# Patient Record
Sex: FEMALE | Race: WHITE | NOT HISPANIC OR LATINO
[De-identification: names, ages, dates, MRNs, and addresses within clinical notes are randomized per-mention and may not be internally consistent; named-entity substitution may affect disease eponyms.]

---

## 2017-01-10 ENCOUNTER — APPOINTMENT (OUTPATIENT)
Dept: OBGYN | Facility: CLINIC | Age: 37
End: 2017-01-10

## 2017-03-27 ENCOUNTER — RESULT REVIEW (OUTPATIENT)
Age: 37
End: 2017-03-27

## 2017-03-28 ENCOUNTER — APPOINTMENT (OUTPATIENT)
Dept: OBGYN | Facility: CLINIC | Age: 37
End: 2017-03-28

## 2017-08-30 ENCOUNTER — APPOINTMENT (OUTPATIENT)
Dept: OBGYN | Facility: CLINIC | Age: 37
End: 2017-08-30
Payer: COMMERCIAL

## 2017-08-30 PROCEDURE — 81025 URINE PREGNANCY TEST: CPT

## 2017-08-30 PROCEDURE — 99214 OFFICE O/P EST MOD 30 MIN: CPT

## 2017-09-29 ENCOUNTER — APPOINTMENT (OUTPATIENT)
Dept: OBGYN | Facility: CLINIC | Age: 37
End: 2017-09-29

## 2017-10-03 ENCOUNTER — APPOINTMENT (OUTPATIENT)
Dept: UROGYNECOLOGY | Facility: CLINIC | Age: 37
End: 2017-10-03

## 2017-10-05 ENCOUNTER — APPOINTMENT (OUTPATIENT)
Dept: ULTRASOUND IMAGING | Facility: IMAGING CENTER | Age: 37
End: 2017-10-05
Payer: COMMERCIAL

## 2017-10-05 ENCOUNTER — APPOINTMENT (OUTPATIENT)
Dept: MAMMOGRAPHY | Facility: IMAGING CENTER | Age: 37
End: 2017-10-05
Payer: COMMERCIAL

## 2017-10-05 ENCOUNTER — OUTPATIENT (OUTPATIENT)
Dept: OUTPATIENT SERVICES | Facility: HOSPITAL | Age: 37
LOS: 1 days | End: 2017-10-05
Payer: COMMERCIAL

## 2017-10-05 DIAGNOSIS — Z00.8 ENCOUNTER FOR OTHER GENERAL EXAMINATION: ICD-10-CM

## 2017-10-05 PROCEDURE — G0204: CPT | Mod: 26

## 2017-10-05 PROCEDURE — 76641 ULTRASOUND BREAST COMPLETE: CPT

## 2017-10-05 PROCEDURE — G0279: CPT | Mod: 26

## 2017-10-05 PROCEDURE — G0279: CPT

## 2017-10-05 PROCEDURE — 77066 DX MAMMO INCL CAD BI: CPT

## 2017-10-05 PROCEDURE — 76641 ULTRASOUND BREAST COMPLETE: CPT | Mod: 26,50

## 2018-08-30 ENCOUNTER — APPOINTMENT (OUTPATIENT)
Dept: UROGYNECOLOGY | Facility: CLINIC | Age: 38
End: 2018-08-30
Payer: COMMERCIAL

## 2018-09-27 ENCOUNTER — APPOINTMENT (OUTPATIENT)
Dept: UROGYNECOLOGY | Facility: CLINIC | Age: 38
End: 2018-09-27
Payer: COMMERCIAL

## 2018-09-27 VITALS
HEART RATE: 92 BPM | WEIGHT: 151 LBS | BODY MASS INDEX: 27.79 KG/M2 | HEIGHT: 62 IN | SYSTOLIC BLOOD PRESSURE: 110 MMHG | DIASTOLIC BLOOD PRESSURE: 77 MMHG

## 2018-09-27 DIAGNOSIS — F17.200 NICOTINE DEPENDENCE, UNSPECIFIED, UNCOMPLICATED: ICD-10-CM

## 2018-09-27 DIAGNOSIS — Z78.9 OTHER SPECIFIED HEALTH STATUS: ICD-10-CM

## 2018-09-27 DIAGNOSIS — Z83.42 FAMILY HISTORY OF FAMILIAL HYPERCHOLESTEROLEMIA: ICD-10-CM

## 2018-09-27 DIAGNOSIS — Z86.39 PERSONAL HISTORY OF OTHER ENDOCRINE, NUTRITIONAL AND METABOLIC DISEASE: ICD-10-CM

## 2018-09-27 DIAGNOSIS — N39.46 MIXED INCONTINENCE: ICD-10-CM

## 2018-09-27 LAB
BILIRUB UR QL STRIP: NORMAL
CLARITY UR: CLEAR
COLLECTION METHOD: NORMAL
GLUCOSE UR-MCNC: 500
HCG UR QL: 0.2 EU/DL
HGB UR QL STRIP.AUTO: NORMAL
KETONES UR-MCNC: NORMAL
LEUKOCYTE ESTERASE UR QL STRIP: NORMAL
NITRITE UR QL STRIP: NORMAL
PH UR STRIP: 5.5
PROT UR STRIP-MCNC: NORMAL
SP GR UR STRIP: 1.02

## 2018-09-27 PROCEDURE — 51725 SIMPLE CYSTOMETROGRAM: CPT

## 2018-09-27 PROCEDURE — 99204 OFFICE O/P NEW MOD 45 MIN: CPT | Mod: 25

## 2018-09-27 PROCEDURE — 51741 ELECTRO-UROFLOWMETRY FIRST: CPT

## 2018-10-01 ENCOUNTER — RESULT REVIEW (OUTPATIENT)
Age: 38
End: 2018-10-01

## 2018-10-01 LAB
APPEARANCE: CLEAR
BACTERIA UR CULT: NORMAL
BACTERIA: NEGATIVE
BILIRUBIN URINE: NEGATIVE
BLOOD URINE: NEGATIVE
COLOR: YELLOW
GLUCOSE QUALITATIVE U: 1000 MG/DL
HYALINE CASTS: 2 /LPF
KETONES URINE: NEGATIVE
LEUKOCYTE ESTERASE URINE: NEGATIVE
MICROSCOPIC-UA: NORMAL
NITRITE URINE: NEGATIVE
PH URINE: 5.5
PROTEIN URINE: NEGATIVE MG/DL
RED BLOOD CELLS URINE: 2 /HPF
SPECIFIC GRAVITY URINE: 1.05
SQUAMOUS EPITHELIAL CELLS: 1 /HPF
UROBILINOGEN URINE: NEGATIVE MG/DL
WHITE BLOOD CELLS URINE: 1 /HPF

## 2018-10-03 ENCOUNTER — EMERGENCY (EMERGENCY)
Facility: HOSPITAL | Age: 38
LOS: 1 days | Discharge: HOME | End: 2018-10-03
Attending: EMERGENCY MEDICINE
Payer: COMMERCIAL

## 2018-10-03 VITALS
DIASTOLIC BLOOD PRESSURE: 67 MMHG | HEART RATE: 90 BPM | SYSTOLIC BLOOD PRESSURE: 104 MMHG | OXYGEN SATURATION: 98 % | TEMPERATURE: 98 F | RESPIRATION RATE: 17 BRPM

## 2018-10-03 VITALS
OXYGEN SATURATION: 97 % | SYSTOLIC BLOOD PRESSURE: 110 MMHG | RESPIRATION RATE: 16 BRPM | HEART RATE: 84 BPM | DIASTOLIC BLOOD PRESSURE: 77 MMHG

## 2018-10-03 PROCEDURE — 99283 EMERGENCY DEPT VISIT LOW MDM: CPT

## 2018-10-03 PROCEDURE — 99284 EMERGENCY DEPT VISIT MOD MDM: CPT
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
## 2018-10-03 NOTE — ED ADULT NURSE NOTE - OBJECTIVE STATEMENT
37y/o female BIBEMS a&ox3 c/o leg weakness. Patient reports about an hour prior to arrival she developed "weakness" above b/l knees. Denies pain, numbness/tingling, difficulty ambulating, vision changes, changes in speech or any other symptoms. States "I wore different shoes yesterday, they were high heals, it could be from the new shoes."  Lungs clear b/l. VAUGHN x4 equal in strength b/l. +sensation b/l. Awaiting MD villavicencio.

## 2018-10-03 NOTE — ED PROVIDER NOTE - PLAN OF CARE
You were seen in the ED for leg weakness. Your physical examination was normal. It is not entirely clear what is causing your symptoms at this time however it does not appear to be anything immediately dangerous. Please see your regular doctor in 1-2 days for further invest

## 2018-10-03 NOTE — ED PROVIDER NOTE - CARE PLAN
Principal Discharge DX:	Fatigue  Assessment and plan of treatment:	You were seen in the ED for leg weakness. Your physical examination was normal. It is not entirely clear what is causing your symptoms at this time however it does not appear to be anything immediately dangerous. Please see your regular doctor in 1-2 days for further invest

## 2018-10-03 NOTE — ED PROVIDER NOTE - NS ED ROS FT
CONST: no fevers, no chills, weight loss, weakness, appetite changes  EYES: no pain, vision loss/dipoplia/decreased vision  ENT: no sore throat, no cough  CV: no chest pain, no palpitations  RESP: no shortness of breath  ABD: no abdominal pain, no nausea, no vomitting  : no dysuria, increased frequency, or hematuria  MSK: no back pain, + left knee weakness   NEURO: no headache or additional neurologic complaints  HEME: no easy bleeding  SKIN:  no rash

## 2018-10-03 NOTE — ED PROVIDER NOTE - ATTENDING CONTRIBUTION TO CARE
Attending MD Johnson:  I personally have seen and examined this patient.  Resident note reviewed and agree on plan of care and except where noted.  See HPI, PE, and MDM for details.

## 2018-10-03 NOTE — ED PROVIDER NOTE - PHYSICAL EXAMINATION
Head: NCAT  ENT: Airway patent, moist mucous membranes, nasal passageways clear, no pharyngeal erythema or exudates, uvula midline, no cervical lymphadenopathy  Cardiac: Normal rate, normal rhythm, no murmurs/rubs/gallops appreciated  Respiratory: Lungs CTA B/L  Gastrointestinal: +BS, Abdomen soft, nontender, nondistended, no rebound, no guarding, no organomegaly   MSK: No gross abnormalities, FROM of all four extremities, no edema  HEME: Extremities warm, pulses intact and symmetrical in all four extremities  Skin: No rashes, no lesions  Neuro: No gross neurologic deficits, CN II-XII intact, no facial asymmetry, no dysarthria, dysdiadochokinesia, strength equal in all four extremities, no gait abnormality

## 2018-10-03 NOTE — ED PROVIDER NOTE - MEDICAL DECISION MAKING DETAILS
Attending MD Johnson: 38F presenting with knee weakness b/l, neuro exam normal, reflexes maintained. Do not suspect GBS or lyte derangements. Plan for outpatient follow up, no urgent investigation warranted at this time. Patient instructed to follow up with her primary care doctor

## 2018-10-03 NOTE — ED ADULT NURSE NOTE - NSIMPLEMENTINTERV_GEN_ALL_ED
Implemented All Universal Safety Interventions:  [place] to call system. Call bell, personal items and telephone within reach. Instruct patient to call for assistance. Room bathroom lighting operational. Non-slip footwear when patient is off stretcher. Physically safe environment: no spills, clutter or unnecessary equipment. Stretcher in lowest position, wheels locked, appropriate side rails in place.

## 2018-10-10 ENCOUNTER — OUTPATIENT (OUTPATIENT)
Dept: OUTPATIENT SERVICES | Facility: HOSPITAL | Age: 38
LOS: 1 days | End: 2018-10-10

## 2018-10-10 ENCOUNTER — APPOINTMENT (OUTPATIENT)
Dept: UROGYNECOLOGY | Facility: CLINIC | Age: 38
End: 2018-10-10
Payer: COMMERCIAL

## 2018-10-10 DIAGNOSIS — N39.3 STRESS INCONTINENCE (FEMALE) (MALE): ICD-10-CM

## 2018-10-10 PROCEDURE — 51797 INTRAABDOMINAL PRESSURE TEST: CPT | Mod: 26

## 2018-10-10 PROCEDURE — 51729 CYSTOMETROGRAM W/VP&UP: CPT | Mod: 26

## 2018-10-10 PROCEDURE — 51784 ANAL/URINARY MUSCLE STUDY: CPT | Mod: 26

## 2018-10-12 ENCOUNTER — RESULT REVIEW (OUTPATIENT)
Age: 38
End: 2018-10-12

## 2018-10-12 LAB — BACTERIA UR CULT: NORMAL

## 2018-10-16 ENCOUNTER — APPOINTMENT (OUTPATIENT)
Dept: UROGYNECOLOGY | Facility: CLINIC | Age: 38
End: 2018-10-16
Payer: COMMERCIAL

## 2018-10-16 ENCOUNTER — OUTPATIENT (OUTPATIENT)
Dept: OUTPATIENT SERVICES | Facility: HOSPITAL | Age: 38
LOS: 1 days | End: 2018-10-16
Payer: COMMERCIAL

## 2018-10-16 VITALS
DIASTOLIC BLOOD PRESSURE: 68 MMHG | TEMPERATURE: 98 F | OXYGEN SATURATION: 98 % | SYSTOLIC BLOOD PRESSURE: 108 MMHG | HEIGHT: 62 IN | WEIGHT: 147.93 LBS | HEART RATE: 75 BPM | RESPIRATION RATE: 18 BRPM

## 2018-10-16 DIAGNOSIS — N81.11 CYSTOCELE, MIDLINE: ICD-10-CM

## 2018-10-16 DIAGNOSIS — N39.3 STRESS INCONTINENCE (FEMALE) (MALE): ICD-10-CM

## 2018-10-16 DIAGNOSIS — Z01.818 ENCOUNTER FOR OTHER PREPROCEDURAL EXAMINATION: ICD-10-CM

## 2018-10-16 DIAGNOSIS — Z90.49 ACQUIRED ABSENCE OF OTHER SPECIFIED PARTS OF DIGESTIVE TRACT: Chronic | ICD-10-CM

## 2018-10-16 DIAGNOSIS — N32.81 OVERACTIVE BLADDER: ICD-10-CM

## 2018-10-16 DIAGNOSIS — G70.00 MYASTHENIA GRAVIS WITHOUT (ACUTE) EXACERBATION: Chronic | ICD-10-CM

## 2018-10-16 PROCEDURE — 85027 COMPLETE CBC AUTOMATED: CPT

## 2018-10-16 PROCEDURE — G0463: CPT

## 2018-10-16 PROCEDURE — 52000 CYSTOURETHROSCOPY: CPT

## 2018-10-16 RX ORDER — SODIUM CHLORIDE 9 MG/ML
3 INJECTION INTRAMUSCULAR; INTRAVENOUS; SUBCUTANEOUS EVERY 8 HOURS
Qty: 0 | Refills: 0 | Status: DISCONTINUED | OUTPATIENT
Start: 2018-10-24 | End: 2018-11-08

## 2018-10-16 RX ORDER — LIDOCAINE HCL 20 MG/ML
0.2 VIAL (ML) INJECTION DAILY
Qty: 0 | Refills: 0 | Status: DISCONTINUED | OUTPATIENT
Start: 2018-10-24 | End: 2018-11-08

## 2018-10-16 NOTE — H&P PST ADULT - HISTORY OF PRESENT ILLNESS
38yr old female with stress incontinence coming in for  midurethral sling cystoscopy anterior colporrhaphy

## 2018-10-16 NOTE — H&P PST ADULT - ATTENDING COMMENTS
patient admitted with stress incontinence for suburethral sling, possible anterior repair. Risks benefits and alternatives have been reviewed.  Aware of mesh risks and FDA status related to slings and vaginal mesh

## 2018-10-16 NOTE — H&P PST ADULT - NSANTHOSAYNRD_GEN_A_CORE
No. SANJUANA screening performed.  STOP BANG Legend: 0-2 = LOW Risk; 3-4 = INTERMEDIATE Risk; 5-8 = HIGH Risk

## 2018-10-16 NOTE — H&P PST ADULT - NSANTHSNORERD_ENT_A_CORE
AM Hospitalist Daily Progress Note    Ginger Matos  89 year old female  [id number]    Date: 5/14/2017     Chief Complaint: Abdominal pain    Subjective: Resting comfortably in bed    Review of Systems : Ten systems reviewed. Complaints are as mentioned above.    Vitals :   Vitals:    05/14/17 1300   BP:    Pulse:    Resp:    Temp: 97.9 °F (36.6 °C)       Physical Examination :  General : Awake, Alert, Oriented x 3. No acute distress.  HEENT : Head is normocephalic, atraumatic. JOSELO. Oral mucosa moist.  Neck : Supple, No JVD, No LAD.  Lungs : Clear to auscultation bilaterally. No wheezes or crackles.  Heart : Regular rate and rhythm. Stystolic murmur.  Abdomen : Soft, positive bowel sounds, Non tender, Non distended.  Musculoskeletal : No edema in bilateral lower extremities.  Neurological : Alert, Pleasantly confused,    Medications : Reviewed.    Laboratory data :      Recent Labs  Lab 05/14/17  0615 05/14/17  0354 05/13/17  0345 05/12/17  1519   WBC 8.2  --  16.8* 8.9   HCT 38.3  --  38.7 46.3   HGB 13.0  --  12.9 15.2   *  --  148 159   SODIUM  --  144 146*  --    POTASSIUM  --  4.5 3.8  --    CHLORIDE  --  113* 109*  --    CO2  --  22 22  --    CALCIUM  --  7.8* 8.1*  --    GLUCOSE  --  113* 228*  --    BUN  --  16 20  --    CREATININE  --  0.65 0.75  --    AST  --  191* 387* 152*   GPT  --  184* 237* 72   ALKPT  --  137* 167* 163*   BILIRUBIN  --  3.9* 3.8* 1.7*   ALBUMIN  --  2.2* 2.8* 3.4*   LIPA  --  76 692* 3717*   GFRNA  --  75 67  --        Assessment / Diagnoses :  Gallstone pancreatitis s/p ERCP and   Acute cholecystitis  Leukocytosis  Dementia    Plan :  Keep NPO  Lap chris today  Cont IV fluid / Iv Abx    DVT/VTE Prophylaxis:  VTE Pharmacologic Prophylaxis: Yes  VTE Mechanical Prophylaxis: Yes      Nilson Santacruz MD     No

## 2018-10-18 ENCOUNTER — APPOINTMENT (OUTPATIENT)
Dept: UROGYNECOLOGY | Facility: CLINIC | Age: 38
End: 2018-10-18
Payer: COMMERCIAL

## 2018-10-18 PROCEDURE — 99214 OFFICE O/P EST MOD 30 MIN: CPT

## 2018-10-23 ENCOUNTER — TRANSCRIPTION ENCOUNTER (OUTPATIENT)
Age: 38
End: 2018-10-23

## 2018-10-24 ENCOUNTER — APPOINTMENT (OUTPATIENT)
Dept: UROGYNECOLOGY | Facility: HOSPITAL | Age: 38
End: 2018-10-24
Payer: COMMERCIAL

## 2018-10-24 ENCOUNTER — OUTPATIENT (OUTPATIENT)
Dept: OUTPATIENT SERVICES | Facility: HOSPITAL | Age: 38
LOS: 1 days | End: 2018-10-24
Payer: COMMERCIAL

## 2018-10-24 VITALS
SYSTOLIC BLOOD PRESSURE: 103 MMHG | HEART RATE: 77 BPM | TEMPERATURE: 98 F | OXYGEN SATURATION: 99 % | DIASTOLIC BLOOD PRESSURE: 72 MMHG | RESPIRATION RATE: 18 BRPM | WEIGHT: 147.93 LBS | HEIGHT: 62 IN

## 2018-10-24 VITALS
SYSTOLIC BLOOD PRESSURE: 111 MMHG | OXYGEN SATURATION: 99 % | DIASTOLIC BLOOD PRESSURE: 70 MMHG | RESPIRATION RATE: 17 BRPM | HEART RATE: 72 BPM

## 2018-10-24 DIAGNOSIS — N81.11 CYSTOCELE, MIDLINE: ICD-10-CM

## 2018-10-24 DIAGNOSIS — N39.3 STRESS INCONTINENCE (FEMALE) (MALE): ICD-10-CM

## 2018-10-24 DIAGNOSIS — Z90.49 ACQUIRED ABSENCE OF OTHER SPECIFIED PARTS OF DIGESTIVE TRACT: Chronic | ICD-10-CM

## 2018-10-24 DIAGNOSIS — G70.00 MYASTHENIA GRAVIS WITHOUT (ACUTE) EXACERBATION: Chronic | ICD-10-CM

## 2018-10-24 PROCEDURE — 57240 ANTERIOR COLPORRHAPHY: CPT

## 2018-10-24 PROCEDURE — C1771: CPT

## 2018-10-24 PROCEDURE — 57288 REPAIR BLADDER DEFECT: CPT

## 2018-10-24 RX ORDER — CELECOXIB 200 MG/1
200 CAPSULE ORAL ONCE
Qty: 0 | Refills: 0 | Status: COMPLETED | OUTPATIENT
Start: 2018-10-24 | End: 2018-10-24

## 2018-10-24 RX ORDER — CEFAZOLIN SODIUM 1 G
2000 VIAL (EA) INJECTION ONCE
Qty: 0 | Refills: 0 | Status: COMPLETED | OUTPATIENT
Start: 2018-10-24 | End: 2018-10-24

## 2018-10-24 RX ORDER — FAMOTIDINE 10 MG/ML
20 INJECTION INTRAVENOUS ONCE
Qty: 0 | Refills: 0 | Status: COMPLETED | OUTPATIENT
Start: 2018-10-24 | End: 2018-10-24

## 2018-10-24 RX ORDER — OXYCODONE HYDROCHLORIDE 5 MG/1
1 TABLET ORAL
Qty: 30 | Refills: 0
Start: 2018-10-24

## 2018-10-24 RX ORDER — OXYCODONE HYDROCHLORIDE 5 MG/1
1 TABLET ORAL
Qty: 6 | Refills: 0 | OUTPATIENT
Start: 2018-10-24

## 2018-10-24 RX ORDER — ONDANSETRON 8 MG/1
4 TABLET, FILM COATED ORAL ONCE
Qty: 0 | Refills: 0 | Status: COMPLETED | OUTPATIENT
Start: 2018-10-24 | End: 2018-10-24

## 2018-10-24 RX ORDER — DOCUSATE SODIUM 100 MG
100 CAPSULE ORAL ONCE
Qty: 0 | Refills: 0 | Status: DISCONTINUED | OUTPATIENT
Start: 2018-10-24 | End: 2018-11-08

## 2018-10-24 RX ORDER — OXYCODONE HYDROCHLORIDE 5 MG/1
5 TABLET ORAL ONCE
Qty: 0 | Refills: 0 | Status: DISCONTINUED | OUTPATIENT
Start: 2018-10-24 | End: 2018-10-24

## 2018-10-24 RX ORDER — HYDROMORPHONE HYDROCHLORIDE 2 MG/ML
0.5 INJECTION INTRAMUSCULAR; INTRAVENOUS; SUBCUTANEOUS
Qty: 0 | Refills: 0 | Status: DISCONTINUED | OUTPATIENT
Start: 2018-10-24 | End: 2018-10-24

## 2018-10-24 RX ADMIN — HYDROMORPHONE HYDROCHLORIDE 0.5 MILLIGRAM(S): 2 INJECTION INTRAMUSCULAR; INTRAVENOUS; SUBCUTANEOUS at 11:52

## 2018-10-24 RX ADMIN — ONDANSETRON 4 MILLIGRAM(S): 8 TABLET, FILM COATED ORAL at 11:51

## 2018-10-24 RX ADMIN — OXYCODONE HYDROCHLORIDE 5 MILLIGRAM(S): 5 TABLET ORAL at 11:52

## 2018-10-24 RX ADMIN — CELECOXIB 200 MILLIGRAM(S): 200 CAPSULE ORAL at 11:51

## 2018-10-24 RX ADMIN — FAMOTIDINE 20 MILLIGRAM(S): 10 INJECTION INTRAVENOUS at 09:43

## 2018-10-24 NOTE — ASU DISCHARGE PLAN (ADULT/PEDIATRIC). - MEDICATION SUMMARY - MEDICATIONS TO TAKE
I will START or STAY ON the medications listed below when I get home from the hospital:    oxyCODONE 5 mg oral tablet  -- 1 tab(s) by mouth every 6 hours, As Needed -for severe pain MDD:4 tablets   -- Caution federal law prohibits the transfer of this drug to any person other  than the person for whom it was prescribed.  It is very important that you take or use this exactly as directed.  Do not skip doses or discontinue unless directed by your doctor.  May cause drowsiness.  Alcohol may intensify this effect.  Use care when operating dangerous machinery.  This prescription cannot be refilled.  Using more of this medication than prescribed may cause serious breathing problems.    -- Indication: For Pain    Synthroid 25 mcg (0.025 mg) oral tablet  -- 1 tab(s) by mouth once a day  -- Indication: For Home med

## 2018-10-24 NOTE — BRIEF OPERATIVE NOTE - PROCEDURE
<<-----Click on this checkbox to enter Procedure Retropubic sling procedure using transvaginal tape for female stress incontinence with cystoscopy  10/24/2018    Active  DELNEEMANY

## 2018-11-06 ENCOUNTER — APPOINTMENT (OUTPATIENT)
Dept: UROGYNECOLOGY | Facility: CLINIC | Age: 38
End: 2018-11-06
Payer: COMMERCIAL

## 2018-11-06 PROCEDURE — 99024 POSTOP FOLLOW-UP VISIT: CPT | Mod: GC

## 2018-11-16 PROBLEM — N32.81 OAB (OVERACTIVE BLADDER): Status: ACTIVE | Noted: 2018-09-27

## 2019-03-08 ENCOUNTER — APPOINTMENT (OUTPATIENT)
Dept: OBGYN | Facility: CLINIC | Age: 39
End: 2019-03-08
Payer: COMMERCIAL

## 2019-03-08 ENCOUNTER — RESULT REVIEW (OUTPATIENT)
Age: 39
End: 2019-03-08

## 2019-03-08 PROCEDURE — 99395 PREV VISIT EST AGE 18-39: CPT

## 2019-09-27 DIAGNOSIS — Z01.818 ENCOUNTER FOR OTHER PREPROCEDURAL EXAMINATION: ICD-10-CM

## 2019-10-15 ENCOUNTER — APPOINTMENT (OUTPATIENT)
Dept: INFECTIOUS DISEASE | Facility: CLINIC | Age: 39
End: 2019-10-15
Payer: SELF-PAY

## 2019-10-15 DIAGNOSIS — Z71.89 OTHER SPECIFIED COUNSELING: ICD-10-CM

## 2019-10-15 PROCEDURE — 99401 PREV MED CNSL INDIV APPRX 15: CPT

## 2020-02-11 ENCOUNTER — APPOINTMENT (OUTPATIENT)
Dept: OBGYN | Facility: CLINIC | Age: 40
End: 2020-02-11
Payer: COMMERCIAL

## 2020-02-11 ENCOUNTER — RESULT REVIEW (OUTPATIENT)
Age: 40
End: 2020-02-11

## 2020-02-11 PROCEDURE — 99396 PREV VISIT EST AGE 40-64: CPT

## 2020-09-07 ENCOUNTER — TRANSCRIPTION ENCOUNTER (OUTPATIENT)
Age: 40
End: 2020-09-07

## 2020-09-15 ENCOUNTER — TRANSCRIPTION ENCOUNTER (OUTPATIENT)
Age: 40
End: 2020-09-15

## 2020-09-20 ENCOUNTER — EMERGENCY (EMERGENCY)
Facility: HOSPITAL | Age: 40
LOS: 1 days | Discharge: HOME | End: 2020-09-20
Attending: EMERGENCY MEDICINE | Admitting: EMERGENCY MEDICINE
Payer: COMMERCIAL

## 2020-09-20 VITALS
RESPIRATION RATE: 16 BRPM | WEIGHT: 149.91 LBS | HEART RATE: 84 BPM | OXYGEN SATURATION: 100 % | DIASTOLIC BLOOD PRESSURE: 95 MMHG | TEMPERATURE: 98 F | HEIGHT: 62 IN | SYSTOLIC BLOOD PRESSURE: 144 MMHG

## 2020-09-20 VITALS
HEART RATE: 75 BPM | OXYGEN SATURATION: 98 % | SYSTOLIC BLOOD PRESSURE: 116 MMHG | RESPIRATION RATE: 16 BRPM | TEMPERATURE: 98 F | DIASTOLIC BLOOD PRESSURE: 85 MMHG

## 2020-09-20 DIAGNOSIS — G70.00 MYASTHENIA GRAVIS WITHOUT (ACUTE) EXACERBATION: Chronic | ICD-10-CM

## 2020-09-20 DIAGNOSIS — Z90.49 ACQUIRED ABSENCE OF OTHER SPECIFIED PARTS OF DIGESTIVE TRACT: Chronic | ICD-10-CM

## 2020-09-20 LAB
ALBUMIN SERPL ELPH-MCNC: 4.6 G/DL — SIGNIFICANT CHANGE UP (ref 3.3–5)
ALP SERPL-CCNC: 33 U/L — LOW (ref 40–120)
ALT FLD-CCNC: 15 U/L — SIGNIFICANT CHANGE UP (ref 10–45)
ANION GAP SERPL CALC-SCNC: 10 MMOL/L — SIGNIFICANT CHANGE UP (ref 5–17)
AST SERPL-CCNC: 37 U/L — SIGNIFICANT CHANGE UP (ref 10–40)
BASOPHILS # BLD AUTO: 0.04 K/UL — SIGNIFICANT CHANGE UP (ref 0–0.2)
BASOPHILS NFR BLD AUTO: 0.5 % — SIGNIFICANT CHANGE UP (ref 0–2)
BILIRUB SERPL-MCNC: 0.4 MG/DL — SIGNIFICANT CHANGE UP (ref 0.2–1.2)
BUN SERPL-MCNC: 11 MG/DL — SIGNIFICANT CHANGE UP (ref 7–23)
CALCIUM SERPL-MCNC: 9.1 MG/DL — SIGNIFICANT CHANGE UP (ref 8.4–10.5)
CHLORIDE SERPL-SCNC: 105 MMOL/L — SIGNIFICANT CHANGE UP (ref 96–108)
CO2 SERPL-SCNC: 21 MMOL/L — LOW (ref 22–31)
CREAT SERPL-MCNC: 0.49 MG/DL — LOW (ref 0.5–1.3)
EOSINOPHIL # BLD AUTO: 0.16 K/UL — SIGNIFICANT CHANGE UP (ref 0–0.5)
EOSINOPHIL NFR BLD AUTO: 1.9 % — SIGNIFICANT CHANGE UP (ref 0–6)
GLUCOSE SERPL-MCNC: 112 MG/DL — HIGH (ref 70–99)
HCG SERPL-ACNC: <2 MIU/ML — SIGNIFICANT CHANGE UP
HCG UR QL: POSITIVE
HCT VFR BLD CALC: 43 % — SIGNIFICANT CHANGE UP (ref 34.5–45)
HGB BLD-MCNC: 14.3 G/DL — SIGNIFICANT CHANGE UP (ref 11.5–15.5)
IMM GRANULOCYTES NFR BLD AUTO: 0.4 % — SIGNIFICANT CHANGE UP (ref 0–1.5)
LYMPHOCYTES # BLD AUTO: 1.62 K/UL — SIGNIFICANT CHANGE UP (ref 1–3.3)
LYMPHOCYTES # BLD AUTO: 19.2 % — SIGNIFICANT CHANGE UP (ref 13–44)
MAGNESIUM SERPL-MCNC: 2.2 MG/DL — SIGNIFICANT CHANGE UP (ref 1.6–2.6)
MCHC RBC-ENTMCNC: 29.2 PG — SIGNIFICANT CHANGE UP (ref 27–34)
MCHC RBC-ENTMCNC: 33.3 GM/DL — SIGNIFICANT CHANGE UP (ref 32–36)
MCV RBC AUTO: 87.8 FL — SIGNIFICANT CHANGE UP (ref 80–100)
MONOCYTES # BLD AUTO: 0.51 K/UL — SIGNIFICANT CHANGE UP (ref 0–0.9)
MONOCYTES NFR BLD AUTO: 6 % — SIGNIFICANT CHANGE UP (ref 2–14)
NEUTROPHILS # BLD AUTO: 6.08 K/UL — SIGNIFICANT CHANGE UP (ref 1.8–7.4)
NEUTROPHILS NFR BLD AUTO: 72 % — SIGNIFICANT CHANGE UP (ref 43–77)
NRBC # BLD: 0 /100 WBCS — SIGNIFICANT CHANGE UP (ref 0–0)
PLATELET # BLD AUTO: 198 K/UL — SIGNIFICANT CHANGE UP (ref 150–400)
POTASSIUM SERPL-MCNC: 5 MMOL/L — SIGNIFICANT CHANGE UP (ref 3.5–5.3)
POTASSIUM SERPL-SCNC: 5 MMOL/L — SIGNIFICANT CHANGE UP (ref 3.5–5.3)
PROT SERPL-MCNC: 8.2 G/DL — SIGNIFICANT CHANGE UP (ref 6–8.3)
RBC # BLD: 4.9 M/UL — SIGNIFICANT CHANGE UP (ref 3.8–5.2)
RBC # FLD: 12.7 % — SIGNIFICANT CHANGE UP (ref 10.3–14.5)
SODIUM SERPL-SCNC: 136 MMOL/L — SIGNIFICANT CHANGE UP (ref 135–145)
TROPONIN T, HIGH SENSITIVITY RESULT: <6 NG/L — SIGNIFICANT CHANGE UP (ref 0–51)
TROPONIN T, HIGH SENSITIVITY RESULT: <6 NG/L — SIGNIFICANT CHANGE UP (ref 0–51)
TSH SERPL-MCNC: 14.7 UIU/ML — HIGH (ref 0.27–4.2)
WBC # BLD: 8.44 K/UL — SIGNIFICANT CHANGE UP (ref 3.8–10.5)
WBC # FLD AUTO: 8.44 K/UL — SIGNIFICANT CHANGE UP (ref 3.8–10.5)

## 2020-09-20 PROCEDURE — 84443 ASSAY THYROID STIM HORMONE: CPT

## 2020-09-20 PROCEDURE — 83690 ASSAY OF LIPASE: CPT

## 2020-09-20 PROCEDURE — 36415 COLL VENOUS BLD VENIPUNCTURE: CPT

## 2020-09-20 PROCEDURE — 84484 ASSAY OF TROPONIN QUANT: CPT

## 2020-09-20 PROCEDURE — 71046 X-RAY EXAM CHEST 2 VIEWS: CPT | Mod: 26

## 2020-09-20 PROCEDURE — 71046 X-RAY EXAM CHEST 2 VIEWS: CPT

## 2020-09-20 PROCEDURE — 80053 COMPREHEN METABOLIC PANEL: CPT

## 2020-09-20 PROCEDURE — 81025 URINE PREGNANCY TEST: CPT

## 2020-09-20 PROCEDURE — 84702 CHORIONIC GONADOTROPIN TEST: CPT

## 2020-09-20 PROCEDURE — 83735 ASSAY OF MAGNESIUM: CPT

## 2020-09-20 PROCEDURE — 85025 COMPLETE CBC W/AUTO DIFF WBC: CPT

## 2020-09-20 PROCEDURE — 99284 EMERGENCY DEPT VISIT MOD MDM: CPT | Mod: 25

## 2020-09-20 PROCEDURE — 93005 ELECTROCARDIOGRAM TRACING: CPT

## 2020-09-20 PROCEDURE — 99285 EMERGENCY DEPT VISIT HI MDM: CPT

## 2020-09-20 RX ORDER — OXYCODONE HYDROCHLORIDE 5 MG/1
5 TABLET ORAL ONCE
Refills: 0 | Status: DISCONTINUED | OUTPATIENT
Start: 2020-09-20 | End: 2020-09-20

## 2020-09-20 RX ORDER — LIDOCAINE 4 G/100G
10 CREAM TOPICAL ONCE
Refills: 0 | Status: COMPLETED | OUTPATIENT
Start: 2020-09-20 | End: 2020-09-20

## 2020-09-20 RX ORDER — FAMOTIDINE 10 MG/ML
20 INJECTION INTRAVENOUS DAILY
Refills: 0 | Status: DISCONTINUED | OUTPATIENT
Start: 2020-09-20 | End: 2020-09-23

## 2020-09-20 RX ORDER — LIDOCAINE AND PRILOCAINE CREAM 25; 25 MG/G; MG/G
1 CREAM TOPICAL ONCE
Refills: 0 | Status: COMPLETED | OUTPATIENT
Start: 2020-09-20 | End: 2020-09-20

## 2020-09-20 RX ADMIN — OXYCODONE HYDROCHLORIDE 5 MILLIGRAM(S): 5 TABLET ORAL at 10:32

## 2020-09-20 RX ADMIN — LIDOCAINE 10 MILLILITER(S): 4 CREAM TOPICAL at 08:03

## 2020-09-20 RX ADMIN — LIDOCAINE AND PRILOCAINE CREAM 1 APPLICATION(S): 25; 25 CREAM TOPICAL at 09:04

## 2020-09-20 RX ADMIN — FAMOTIDINE 20 MILLIGRAM(S): 10 INJECTION INTRAVENOUS at 10:35

## 2020-09-20 RX ADMIN — Medication 30 MILLILITER(S): at 08:03

## 2020-09-20 RX ADMIN — OXYCODONE HYDROCHLORIDE 5 MILLIGRAM(S): 5 TABLET ORAL at 09:04

## 2020-09-20 NOTE — ED PROVIDER NOTE - PATIENT PORTAL LINK FT
You can access the FollowMyHealth Patient Portal offered by [facility] by registering at the following website: [email or web address]. By joining [employer]’s FollowMyHealth portal, you will also be able to view your health information using other applications (apps) compatible with our system.

## 2020-09-20 NOTE — ED ADULT NURSE REASSESSMENT NOTE - NS ED NURSE REASSESS COMMENT FT1
Pt complaining of tingling and burning to left lateral back wrapping around to left axilla, redness noted on left lateral back, skin intact, concerning for shingles per Barbara PICKETT. EMLA cream applied, pt medicated for pain, meal provided. Will reassess within appropriate timeframe.

## 2020-09-20 NOTE — ED PROVIDER NOTE - NSFOLLOWUPCLINICS_GEN_ALL_ED_FT
[facility]  Gastroenterology  [address] [place] [id number]  Phone: [phone]  Fax:   Follow Up Time: 1-3 Days

## 2020-09-20 NOTE — ED PROVIDER NOTE - CLINICAL SUMMARY MEDICAL DECISION MAKING FREE TEXT BOX
40 F no sig PMH here for multiple complaints. Obtain chest pain workup, TSH, electrolytes, CXR. Likely DC home.

## 2020-09-20 NOTE — ED PROVIDER NOTE - PROGRESS NOTE DETAILS
Johnna Ayala M.D. Resident   pt signed out to me pending labs and xray. Johnna Ayala M.D. Resident   Pt reassessed, complaining of epigastric abd pain. GI cocktail. Lipase added. Johnna yAala M.D. Resident  lab called, UPreg positive, but likely interference. Lab recommending serum BHCG, sent. Johnna Ayala M.D. Resident  Pt now with mild vesicular rash on L mid thoracic back (T8-9 dermatome). possible early shingles. topical lido, oxy ordered. Johnna Ayala M.D. Resident  lab called, UPreg positive, but likely interference, false positive. Lab recommending serum BHCG, sent. will repeat POC Urine in ED Johnna Ayala M.D. Resident  Pt reassessed, now with mild vesicular rash on L mid thoracic back (T8-9 dermatome). possible early shingles. topical lido, oxy ordered. Pt expressed to Dr. Jimenez that she had some nerds that contained marijuana yesterday. Johnna Ayala M.D. Resident  Pt reassessed, rash now resolved. serum HCG negative. Upreg in ED is negative for pregnancy. Informed pt that + Upreg on discharge paperwork is likely a false positive, and that she should follow up with OB tomorrow for repeat pregnancy test. Pt agrees to plan. Reviewed labs with patient, pt aware of otherwise negative workup. questions answered. Pt agrees to follow up with GI and PMD.

## 2020-09-20 NOTE — ED PROVIDER NOTE - OBJECTIVE STATEMENT
Ismael TO MD PGY3: 40 F no sig PMH here for multiple complaints. Pt initially had a headache that began a week ago. This resolved and led to a left sided chest "chills" that then radiated to the right side. This then gave way to a burning sensation that was on both sides. No rash that she can appreciate. No hx of zoster or skin issues. No meds. No new meds. No oral or vaginal lesions. No fever. Has a daugther that is + COVID, but patient has had two neg COVID tests. Ismael TO MD PGY3: 40 F no sig PMH here for multiple complaints. Pt initially had a headache that began a week ago. This resolved and led to a left sided chest "chills" that then radiated to the right side. This then gave way to a burning sensation that was on both sides. No rash that she can appreciate. No hx of zoster or skin issues. No meds. No new meds. No oral or vaginal lesions. No fever. Has a daugther that is + COVID, but patient has had two neg COVID tests.    Attendinyo female presents with 'chills' in her chest and burning sensation in the chest and abdomen.  symptoms are intermittent.

## 2020-09-20 NOTE — ED ADULT NURSE REASSESSMENT NOTE - NS ED NURSE REASSESS COMMENT FT1
No redness noted to left upper back anymore, Jamie PICKETT aware, pt to provide additional urine for pos UA and neg beta hcg.

## 2020-09-20 NOTE — ED ADULT NURSE NOTE - OBJECTIVE STATEMENT
40y female, denies PMH, complaining of multiple medical complaints, including chest discomfort, abd burning sensation, dry mouth, daughter + covid from college 2 weeks ago, patient has had 2 negative tests since then, denies headache, dizziness, changes in vision, shortness of breath, n/v/d, reports occosional smoking,  moves all extremities with + pulses, IV lock placed, labs drawn, bed in lowest position, comfort and safety provided.

## 2020-09-20 NOTE — ED PROVIDER NOTE - NSFOLLOWUPINSTRUCTIONS_ED_ALL_ED_FT
You were seen in the Emergency Department (ED) for headache, chest pain, back pain, headache.     Please take over the counter Tylenol for your back pain. Take Maalox for your abdominal pain.      Please follow up with your primary care doctor and your Gastroenterologist the next 72 hours.  If you have issues obtaining follow up, please call: [phone] to obtain a doctor or specialist who takes your insurance in your area.    Please return to the ED if you experience any new or concerning symptoms, such as: worsening abdominal pain, chest pain, difficulty breathing, passing out, unable to eat or drink, unable to move or feel part of your body, fever, chills.     Thank you for visiting a [facility] ED.

## 2020-09-20 NOTE — ED PROVIDER NOTE - PHYSICAL EXAMINATION
GENl: Patient awake alert NAD.   HEENT: normocephalic, atraumatic, EOMI, no scleral icterus, moist MM  CARDIAC: RRR, S1, S2, no murmur.   PULM: CTA B/L no wheeze, rhonchi, rales.   ABD: soft NT, ND, no rebound no guarding.   MSK: No edema. 5/5 strength and full ROM in all extremities.     NEURO: A&Ox3, gait normal, no focal neurological deficits, CN 2-12 grossly intact  SKIN: warm, dry, no rash.

## 2020-09-21 NOTE — ED POST DISCHARGE NOTE - DETAILS
9/21/20: Discussed results with pt, discussed need to follow up with PCP, she notes understanding and will do so. -Christiana Membreno PA-C

## 2020-09-24 ENCOUNTER — INPATIENT (INPATIENT)
Facility: HOSPITAL | Age: 40
LOS: 4 days | Discharge: HOME | DRG: 99 | End: 2020-09-29
Attending: PSYCHIATRY & NEUROLOGY | Admitting: PSYCHIATRY & NEUROLOGY
Payer: COMMERCIAL

## 2020-09-24 VITALS
SYSTOLIC BLOOD PRESSURE: 113 MMHG | OXYGEN SATURATION: 98 % | WEIGHT: 145.95 LBS | HEIGHT: 62 IN | TEMPERATURE: 99 F | HEART RATE: 77 BPM | DIASTOLIC BLOOD PRESSURE: 75 MMHG | RESPIRATION RATE: 16 BRPM

## 2020-09-24 DIAGNOSIS — Z90.49 ACQUIRED ABSENCE OF OTHER SPECIFIED PARTS OF DIGESTIVE TRACT: Chronic | ICD-10-CM

## 2020-09-24 DIAGNOSIS — G70.00 MYASTHENIA GRAVIS WITHOUT (ACUTE) EXACERBATION: Chronic | ICD-10-CM

## 2020-09-24 PROCEDURE — 99285 EMERGENCY DEPT VISIT HI MDM: CPT

## 2020-09-24 NOTE — ED ADULT TRIAGE NOTE - CHIEF COMPLAINT QUOTE
p/w c/o ble numbness for few dasy now, seen here in [facility] 4 days ago and was dc and was told to ff-up with Neurologist. Went to Neuro and was told to have an MRI. H/O anxiety and MRI

## 2020-09-25 DIAGNOSIS — R20.0 ANESTHESIA OF SKIN: ICD-10-CM

## 2020-09-25 LAB
% ALBUMIN: 57.2 % — SIGNIFICANT CHANGE UP
% ALPHA 1: 3.7 % — SIGNIFICANT CHANGE UP
% ALPHA 2: 8.6 % — SIGNIFICANT CHANGE UP
% BETA: 11.8 % — SIGNIFICANT CHANGE UP
% GAMMA: 18.7 % — SIGNIFICANT CHANGE UP
% M SPIKE: SIGNIFICANT CHANGE UP
ALBUMIN SERPL ELPH-MCNC: 4.7 G/DL — SIGNIFICANT CHANGE UP (ref 3.3–5)
ALBUMIN SERPL ELPH-MCNC: 4.7 G/DL — SIGNIFICANT CHANGE UP (ref 3.6–5.5)
ALBUMIN/GLOB SERPL ELPH: 1.3 RATIO — SIGNIFICANT CHANGE UP
ALP SERPL-CCNC: 40 U/L — SIGNIFICANT CHANGE UP (ref 40–120)
ALPHA1 GLOB SERPL ELPH-MCNC: 0.3 G/DL — SIGNIFICANT CHANGE UP (ref 0.1–0.4)
ALPHA2 GLOB SERPL ELPH-MCNC: 0.7 G/DL — SIGNIFICANT CHANGE UP (ref 0.5–1)
ALT FLD-CCNC: 17 U/L — SIGNIFICANT CHANGE UP (ref 10–45)
ANION GAP SERPL CALC-SCNC: 13 MMOL/L — SIGNIFICANT CHANGE UP (ref 5–17)
APPEARANCE CSF: CLEAR — SIGNIFICANT CHANGE UP
APPEARANCE UR: CLEAR — SIGNIFICANT CHANGE UP
AST SERPL-CCNC: 16 U/L — SIGNIFICANT CHANGE UP (ref 10–40)
B-GLOBULIN SERPL ELPH-MCNC: 1 G/DL — SIGNIFICANT CHANGE UP (ref 0.5–1)
BACTERIA # UR AUTO: ABNORMAL
BASOPHILS # BLD AUTO: 0.04 K/UL — SIGNIFICANT CHANGE UP (ref 0–0.2)
BASOPHILS NFR BLD AUTO: 0.4 % — SIGNIFICANT CHANGE UP (ref 0–2)
BILIRUB SERPL-MCNC: 0.5 MG/DL — SIGNIFICANT CHANGE UP (ref 0.2–1.2)
BILIRUB UR-MCNC: NEGATIVE — SIGNIFICANT CHANGE UP
BUN SERPL-MCNC: 12 MG/DL — SIGNIFICANT CHANGE UP (ref 7–23)
CALCIUM SERPL-MCNC: 9.9 MG/DL — SIGNIFICANT CHANGE UP (ref 8.4–10.5)
CERULOPLASMIN SERPL-MCNC: 25 MG/DL — SIGNIFICANT CHANGE UP (ref 16–45)
CHLORIDE SERPL-SCNC: 102 MMOL/L — SIGNIFICANT CHANGE UP (ref 96–108)
CHOLEST SERPL-MCNC: 236 MG/DL — HIGH (ref 10–199)
CO2 SERPL-SCNC: 25 MMOL/L — SIGNIFICANT CHANGE UP (ref 22–31)
COLOR CSF: SIGNIFICANT CHANGE UP
COLOR SPEC: SIGNIFICANT CHANGE UP
CREAT SERPL-MCNC: 0.59 MG/DL — SIGNIFICANT CHANGE UP (ref 0.5–1.3)
CRP SERPL-MCNC: 0.37 MG/DL — SIGNIFICANT CHANGE UP (ref 0–0.4)
DIFF PNL FLD: ABNORMAL
EOSINOPHIL # BLD AUTO: 0.05 K/UL — SIGNIFICANT CHANGE UP (ref 0–0.5)
EOSINOPHIL NFR BLD AUTO: 0.5 % — SIGNIFICANT CHANGE UP (ref 0–6)
EPI CELLS # UR: 9 /HPF — HIGH
GAMMA GLOBULIN: 1.6 G/DL — SIGNIFICANT CHANGE UP (ref 0.6–1.6)
GLUCOSE CSF-MCNC: 64 MG/DL — SIGNIFICANT CHANGE UP (ref 40–70)
GLUCOSE SERPL-MCNC: 115 MG/DL — HIGH (ref 70–99)
GLUCOSE UR QL: ABNORMAL
GRAM STN FLD: SIGNIFICANT CHANGE UP
HCG SERPL-ACNC: <2 MIU/ML — SIGNIFICANT CHANGE UP
HCT VFR BLD CALC: 44.7 % — SIGNIFICANT CHANGE UP (ref 34.5–45)
HDLC SERPL-MCNC: 56 MG/DL — SIGNIFICANT CHANGE UP
HGB BLD-MCNC: 15 G/DL — SIGNIFICANT CHANGE UP (ref 11.5–15.5)
HYALINE CASTS # UR AUTO: 0 /LPF — SIGNIFICANT CHANGE UP (ref 0–2)
IMM GRANULOCYTES NFR BLD AUTO: 0.5 % — SIGNIFICANT CHANGE UP (ref 0–1.5)
INTERPRETATION SERPL IFE-IMP: SIGNIFICANT CHANGE UP
KETONES UR-MCNC: NEGATIVE — SIGNIFICANT CHANGE UP
LDH CSF L TO P-CCNC: 16 U/L — SIGNIFICANT CHANGE UP
LDH FLD-CCNC: 16 U/L — SIGNIFICANT CHANGE UP
LEUKOCYTE ESTERASE UR-ACNC: ABNORMAL
LIPID PNL WITH DIRECT LDL SERPL: 164 MG/DL — HIGH
LYMPHOCYTES # BLD AUTO: 1.26 K/UL — SIGNIFICANT CHANGE UP (ref 1–3.3)
LYMPHOCYTES # BLD AUTO: 13 % — SIGNIFICANT CHANGE UP (ref 13–44)
LYMPHOCYTES # CSF: 77 % — SIGNIFICANT CHANGE UP (ref 40–80)
M-SPIKE: SIGNIFICANT CHANGE UP (ref 0–0)
MCHC RBC-ENTMCNC: 29.1 PG — SIGNIFICANT CHANGE UP (ref 27–34)
MCHC RBC-ENTMCNC: 33.6 GM/DL — SIGNIFICANT CHANGE UP (ref 32–36)
MCV RBC AUTO: 86.8 FL — SIGNIFICANT CHANGE UP (ref 80–100)
MONOCYTES # BLD AUTO: 0.49 K/UL — SIGNIFICANT CHANGE UP (ref 0–0.9)
MONOCYTES NFR BLD AUTO: 5 % — SIGNIFICANT CHANGE UP (ref 2–14)
MONOS+MACROS NFR CSF: 11 % — LOW (ref 15–45)
NEUTROPHILS # BLD AUTO: 7.83 K/UL — HIGH (ref 1.8–7.4)
NEUTROPHILS # CSF: 12 % — HIGH (ref 0–6)
NEUTROPHILS NFR BLD AUTO: 80.6 % — HIGH (ref 43–77)
NITRITE UR-MCNC: NEGATIVE — SIGNIFICANT CHANGE UP
NRBC # BLD: 0 /100 WBCS — SIGNIFICANT CHANGE UP (ref 0–0)
NRBC NFR CSF: 26 /UL — HIGH (ref 0–5)
PH UR: 6 — SIGNIFICANT CHANGE UP (ref 5–8)
PLATELET # BLD AUTO: 248 K/UL — SIGNIFICANT CHANGE UP (ref 150–400)
POTASSIUM SERPL-MCNC: 3.5 MMOL/L — SIGNIFICANT CHANGE UP (ref 3.5–5.3)
POTASSIUM SERPL-SCNC: 3.5 MMOL/L — SIGNIFICANT CHANGE UP (ref 3.5–5.3)
PROT CSF-MCNC: 44 MG/DL — SIGNIFICANT CHANGE UP (ref 15–45)
PROT PATTERN SERPL ELPH-IMP: SIGNIFICANT CHANGE UP
PROT SERPL-MCNC: 8.3 G/DL — SIGNIFICANT CHANGE UP (ref 6–8.3)
PROT SERPL-MCNC: 8.3 G/DL — SIGNIFICANT CHANGE UP (ref 6–8.3)
PROT UR-MCNC: NEGATIVE — SIGNIFICANT CHANGE UP
RBC # BLD: 5.15 M/UL — SIGNIFICANT CHANGE UP (ref 3.8–5.2)
RBC # CSF: 0 /UL — SIGNIFICANT CHANGE UP (ref 0–0)
RBC # FLD: 12.6 % — SIGNIFICANT CHANGE UP (ref 10.3–14.5)
RBC CASTS # UR COMP ASSIST: 2 /HPF — SIGNIFICANT CHANGE UP (ref 0–4)
SARS-COV-2 RNA SPEC QL NAA+PROBE: SIGNIFICANT CHANGE UP
SODIUM SERPL-SCNC: 140 MMOL/L — SIGNIFICANT CHANGE UP (ref 135–145)
SP GR SPEC: 1.02 — SIGNIFICANT CHANGE UP (ref 1.01–1.02)
SPECIMEN SOURCE: SIGNIFICANT CHANGE UP
TOTAL CHOLESTEROL/HDL RATIO MEASUREMENT: 4.2 RATIO — SIGNIFICANT CHANGE UP (ref 3.3–7.1)
TRIGL SERPL-MCNC: 80 MG/DL — SIGNIFICANT CHANGE UP (ref 10–149)
TSH SERPL-MCNC: 5.45 UIU/ML — HIGH (ref 0.27–4.2)
TUBE TYPE: SIGNIFICANT CHANGE UP
UROBILINOGEN FLD QL: NEGATIVE — SIGNIFICANT CHANGE UP
VIT B12 SERPL-MCNC: 469 PG/ML — SIGNIFICANT CHANGE UP (ref 232–1245)
WBC # BLD: 9.72 K/UL — SIGNIFICANT CHANGE UP (ref 3.8–10.5)
WBC # FLD AUTO: 9.72 K/UL — SIGNIFICANT CHANGE UP (ref 3.8–10.5)
WBC UR QL: 14 /HPF — HIGH (ref 0–5)

## 2020-09-25 PROCEDURE — 72158 MRI LUMBAR SPINE W/O & W/DYE: CPT | Mod: 26

## 2020-09-25 PROCEDURE — 72156 MRI NECK SPINE W/O & W/DYE: CPT | Mod: 26

## 2020-09-25 PROCEDURE — 99223 1ST HOSP IP/OBS HIGH 75: CPT | Mod: GC,25

## 2020-09-25 PROCEDURE — 70553 MRI BRAIN STEM W/O & W/DYE: CPT | Mod: 26

## 2020-09-25 PROCEDURE — 99218: CPT

## 2020-09-25 PROCEDURE — 62270 DX LMBR SPI PNXR: CPT | Mod: GC

## 2020-09-25 PROCEDURE — 72157 MRI CHEST SPINE W/O & W/DYE: CPT | Mod: 26

## 2020-09-25 RX ORDER — ENOXAPARIN SODIUM 100 MG/ML
40 INJECTION SUBCUTANEOUS DAILY
Refills: 0 | Status: DISCONTINUED | OUTPATIENT
Start: 2020-09-25 | End: 2020-09-29

## 2020-09-25 RX ORDER — DEXTROSE 50 % IN WATER 50 %
12.5 SYRINGE (ML) INTRAVENOUS ONCE
Refills: 0 | Status: DISCONTINUED | OUTPATIENT
Start: 2020-09-25 | End: 2020-09-29

## 2020-09-25 RX ORDER — DEXTROSE 50 % IN WATER 50 %
25 SYRINGE (ML) INTRAVENOUS ONCE
Refills: 0 | Status: DISCONTINUED | OUTPATIENT
Start: 2020-09-25 | End: 2020-09-29

## 2020-09-25 RX ORDER — LIDOCAINE 4 G/100G
1 CREAM TOPICAL ONCE
Refills: 0 | Status: COMPLETED | OUTPATIENT
Start: 2020-09-25 | End: 2020-09-25

## 2020-09-25 RX ORDER — GLUCAGON INJECTION, SOLUTION 0.5 MG/.1ML
1 INJECTION, SOLUTION SUBCUTANEOUS ONCE
Refills: 0 | Status: DISCONTINUED | OUTPATIENT
Start: 2020-09-25 | End: 2020-09-29

## 2020-09-25 RX ORDER — PANTOPRAZOLE SODIUM 20 MG/1
40 TABLET, DELAYED RELEASE ORAL
Refills: 0 | Status: DISCONTINUED | OUTPATIENT
Start: 2020-09-25 | End: 2020-09-29

## 2020-09-25 RX ORDER — ACETAMINOPHEN 500 MG
1000 TABLET ORAL ONCE
Refills: 0 | Status: COMPLETED | OUTPATIENT
Start: 2020-09-25 | End: 2020-09-25

## 2020-09-25 RX ORDER — GABAPENTIN 400 MG/1
100 CAPSULE ORAL THREE TIMES A DAY
Refills: 0 | Status: DISCONTINUED | OUTPATIENT
Start: 2020-09-25 | End: 2020-09-29

## 2020-09-25 RX ORDER — DEXTROSE 50 % IN WATER 50 %
15 SYRINGE (ML) INTRAVENOUS ONCE
Refills: 0 | Status: DISCONTINUED | OUTPATIENT
Start: 2020-09-25 | End: 2020-09-29

## 2020-09-25 RX ORDER — INSULIN LISPRO 100/ML
VIAL (ML) SUBCUTANEOUS
Refills: 0 | Status: DISCONTINUED | OUTPATIENT
Start: 2020-09-25 | End: 2020-09-29

## 2020-09-25 RX ORDER — LEVOTHYROXINE SODIUM 125 MCG
25 TABLET ORAL DAILY
Refills: 0 | Status: DISCONTINUED | OUTPATIENT
Start: 2020-09-25 | End: 2020-09-29

## 2020-09-25 RX ORDER — ACETAMINOPHEN 500 MG
650 TABLET ORAL ONCE
Refills: 0 | Status: COMPLETED | OUTPATIENT
Start: 2020-09-25 | End: 2020-09-25

## 2020-09-25 RX ORDER — TRAMADOL HYDROCHLORIDE 50 MG/1
50 TABLET ORAL ONCE
Refills: 0 | Status: DISCONTINUED | OUTPATIENT
Start: 2020-09-25 | End: 2020-09-25

## 2020-09-25 RX ORDER — LIDOCAINE 4 G/100G
1 CREAM TOPICAL DAILY
Refills: 0 | Status: DISCONTINUED | OUTPATIENT
Start: 2020-09-25 | End: 2020-09-29

## 2020-09-25 RX ORDER — SODIUM CHLORIDE 9 MG/ML
1000 INJECTION INTRAMUSCULAR; INTRAVENOUS; SUBCUTANEOUS ONCE
Refills: 0 | Status: COMPLETED | OUTPATIENT
Start: 2020-09-25 | End: 2020-09-25

## 2020-09-25 RX ORDER — SODIUM CHLORIDE 9 MG/ML
1000 INJECTION, SOLUTION INTRAVENOUS
Refills: 0 | Status: DISCONTINUED | OUTPATIENT
Start: 2020-09-25 | End: 2020-09-29

## 2020-09-25 RX ADMIN — GABAPENTIN 100 MILLIGRAM(S): 400 CAPSULE ORAL at 21:07

## 2020-09-25 RX ADMIN — LIDOCAINE 1 PATCH: 4 CREAM TOPICAL at 19:00

## 2020-09-25 RX ADMIN — Medication 58 MILLIGRAM(S): at 21:29

## 2020-09-25 RX ADMIN — TRAMADOL HYDROCHLORIDE 50 MILLIGRAM(S): 50 TABLET ORAL at 21:07

## 2020-09-25 RX ADMIN — LIDOCAINE 1 PATCH: 4 CREAM TOPICAL at 18:46

## 2020-09-25 RX ADMIN — GABAPENTIN 100 MILLIGRAM(S): 400 CAPSULE ORAL at 18:46

## 2020-09-25 RX ADMIN — Medication 400 MILLIGRAM(S): at 17:42

## 2020-09-25 RX ADMIN — Medication 1000 MILLIGRAM(S): at 18:01

## 2020-09-25 RX ADMIN — Medication 25 MICROGRAM(S): at 07:46

## 2020-09-25 RX ADMIN — SODIUM CHLORIDE 1000 MILLILITER(S): 9 INJECTION INTRAMUSCULAR; INTRAVENOUS; SUBCUTANEOUS at 15:34

## 2020-09-25 RX ADMIN — PANTOPRAZOLE SODIUM 40 MILLIGRAM(S): 20 TABLET, DELAYED RELEASE ORAL at 18:47

## 2020-09-25 RX ADMIN — TRAMADOL HYDROCHLORIDE 50 MILLIGRAM(S): 50 TABLET ORAL at 21:30

## 2020-09-25 NOTE — CONSULT NOTE ADULT - ATTENDING COMMENTS
MRI shows extensive thoracic transverse myelitis. Will plan to admit patient. See H&P for further details.

## 2020-09-25 NOTE — ED ADULT NURSE NOTE - OBJECTIVE STATEMENT
40 year old female presents to ED c/o bilateral numbness in her lower extremities for a couple of day that has worsened in the last couple of days associated with chills, hot flashes, and shooting back and epigastric pain. Pt went to her neurologist that suggested an MRI for suspected swelling in her spine. The numbess has traveled from her feet upwards and she has decreased sensation along.  Pt has a history of myasthenia gravis but is in remision and has not been taking medication for it. 40 year old female presents to ED c/o bilateral numbness in her lower extremities for a couple of day that has worsened in the last couple of days associated with chills, hot flashes, and shooting back and epigastric pain. Pt went to her neurologist that suggested an MRI for suspected swelling in her spine. The numbness has traveled from her feet upwards and she has decreased sensation along.  Pt has a history of myasthenia gravis but is in remission and has not been taking medication for it. Pt denies chest pain, shortness of breath, headache, nausea, vomiting, diarrhaea, abdominal pain, fever, urinary symptoms, lightheadedness, dizziness, changes in vision 40 year old female presents to ED c/o bilateral numbness in her lower extremities for a couple of day that has worsened in the last couple of days associated with chills, hot flashes, and shooting back and epigastric pain. Pt went to her neurologist that suggested an MRI for suspected swelling in her spine. The numbness has traveled from her feet upwards and she has decreased sensation along.  Pt has a history of myasthenia gravis but is in remission and has not been taking medication for it. Pt denies chest pain, shortness of breath, headache, nausea, vomiting, diarrhaea, abdominal pain, fever, urinary symptoms, lightheadedness, dizziness, changes in vision  SEE NEURO FLOWSHEET

## 2020-09-25 NOTE — ED ADULT NURSE REASSESSMENT NOTE - NS ED NURSE REASSESS COMMENT FT1
15.00 . Pt underwent lumbar Puncture @ 15.00 hrs.  Pt tolerated the Procedure well   Post LP vitals stable  15.30 Pt has bed Report given  to  floor to 4 Salazar   474 D to Rn  Herbie Eastman  . Pt is A&OX 4 post LP   pt went with stretcher to the floor

## 2020-09-25 NOTE — ED CDU PROVIDER INITIAL DAY NOTE - OBJECTIVE STATEMENT
Patient is a 41yo F with pmhx of Myasthenis Gravis s/p thymectomy, Hypothyroidism who presented to the ED and neurology was consulted for concern of LE numbness. Patient reports onset of symptoms 2 weeks ago with headache which resolved with tylenol. Patient then reports onset of chills, pain and burning sensation in her chest, abd and back 2 days later. She states that she saw her PCP who prescribed her naproxen and Gabapentin. The burning sensation resolve but the pain and chills persisted. She then reports of b/l LE numbness (Right then Left) beginning at the toes and progressing to her chest. Patient was seen in [facility] ED on 9/20/20 for these symptoms and discharged with recommendations to follow up with neurology outpatient. Patient was seen by Neurology, Dr. Jones, 2 days ago who recommended MRI of her thoracic spine. She states that she had to wait for insurance to go through and presented to the ED today for exacerbation of her symptoms. Of note patient was prescribed Gabapentin 100mg BID by her PCP and recommended to increase it to 100mg QID however she has not been taking it at all because it made her feel nauseous. Additionally patient has been prescribed levothyroxine for her hypothyroidism however she states that she has not been taking it for over 1 year and instead substituting with turmeric. Patient reports that onset of symptoms 2 weeks ago was after she lifted a heavy pot to place in a cupboard and felt back pain. She reports intermittent sharp shooting back pain but does not specify where it radiates. Patient denies headache, blurry/double vision, tinnitus, dizziness, lightheadedness, nausea, vomiting, weakness  In ED labs unrevealing, neurology saw pt advised CDU for MRI T/L spine concern for spinal pathology. pts neurologist Karen called requesting MRI brain concern for demyelinating process.

## 2020-09-25 NOTE — H&P ADULT - NSHPPHYSICALEXAM_GEN_ALL_CORE
Neurological (>12):  MS: Awake, alert, oriented to person, place, situation, time. Normal affect. Follows all commands.    Language: Speech is clear, fluent with good repetition & comprehension (able to name objects: thumb, watch, pen)    CNs: PERRLA (R = 3mm, L = 3mm). VFF. EOMI no nystagmus. V1-3 intact to LT/pinprick, well developed masseter muscles b/l. No facial asymmetry b/l, full eye closure strength b/l. Hearing grossly normal (rubbing fingers) b/l. Symmetric palate elevation in midline. Gag reflex deferred. Head turning & shoulder shrug intact b/l. Tongue midline, normal movements, no atrophy.    Motor: Normal muscle bulk & tone. No noticeable tremor or seizure. No pronator drift. Tenderness to palpation midline thoracic spine, as well as lumbar tenderness to palpation (latissimus region bilaterally)              Deltoid	Biceps	Triceps	Wrist	   R	5	5	5	5	5			  L	5	5	5	5	5		    	H-Flex	H-Ext	K-Flex	K-Ext	D-Flex	P-Flex  R	5	5	5	5	5	5	 	   L	5	5	5	5	5	5		     Sensation: Decreased subjective sensation to LT and pinprick @ level of T5 and below. Temperature sensation intact except in lower back where temperature appeared colder    Cortical: Extinction on DSS (neglect): none    Reflexes: diffusely brisk              Biceps(C5)       BR(C6)     Triceps(C7)               Patellar(L4)    Achilles(S1)    Plantar Resp  R	2	          2	             2		        2		    2		Down   L	2	          2	             2		        2		    2		Down     Coordination: No dysmetria to FTN    Gait: Normal Romberg. No postural instability. Normal stance and tandem gait.

## 2020-09-25 NOTE — ED ADULT NURSE NOTE - NSIMPLEMENTINTERV_GEN_ALL_ED
Implemented All Fall Risk Interventions:  [place] to call system. Call bell, personal items and telephone within reach. Instruct patient to call for assistance. Room bathroom lighting operational. Non-slip footwear when patient is off stretcher. Physically safe environment: no spills, clutter or unnecessary equipment. Stretcher in lowest position, wheels locked, appropriate side rails in place. Provide visual cue, wrist band, yellow gown, etc. Monitor gait and stability. Monitor for mental status changes and reorient to person, place, and time. Review medications for side effects contributing to fall risk. Reinforce activity limits and safety measures with patient and family.

## 2020-09-25 NOTE — CONSULT NOTE ADULT - SUBJECTIVE AND OBJECTIVE BOX
HPI:  Patient is a 41yo F with pmhx of Myasthenis Gravis s/p thymectomy, Hypothyroidism who presented to the ED and neurology was consulted for concern of LE numbness. Patient reports onset of symptoms 2 weeks ago with headache which resolved with tylenol. Patient then reports onset of chills, pain and burning sensation in her chest, abd and back 2 days later. She states that she saw her PCP who prescribed her naproxen and Gabapentin. The burning sensation resolve but the pain and chills persisted. She then reports of b/l LE numbness (Right then Left) beginning at the toes and progressing to her chest. Patient was seen in [facility] ED on 20 for these symptoms and discharged with recommendations to follow up with neurology outpatient. Patient was seen by Neurology, Dr. Jones, 2 days ago who recommended MRI of her thoracic spine. She states that she had to wait for insurance to go through and presented to the ED today for exacerbation of her symptoms. Of note patient was prescribed Gabapentin 100mg BID by her PCP and recommended to increase it to 100mg QID however she ha not been taking it at all because it made her feel nauseous. Additionally patient has been prescribed levothyroxine for her hypothyroidism however she states that she has not been taking it for over 1 year and instead substituting with turmeric. Patient reports that onset of symptoms 2 weeks ago was after she lifted a heavy pot to place in a cupboard and felt back pain. She reports intermittent sharp shooting back pain but does not specify where it radiates. Patient denies headache, blurry/double vision, tinnitus, dizziness, lightheadedness, nausea, vomiting, weakness.     Per chart review: patient reported taking nerds that contained marijuana on prior ED visit.     ROS: A 10-system ROS was performed and is negative except for those items noted above and/or in the HPI.    PAST MEDICAL & SURGICAL HISTORY:  Myasthenia gravis    Anxiety    Hypothyroid    History of appendectomy      MG status post thymectomy (myasthenia gravis)        FAMILY HISTORY:  No pertinent family history in first degree relatives        SOCIAL HISTORY: SOCIAL HISTORY:     Marital Status: (  )   (  ) Single  (  )   (  )      Occupation:      Lives: (  ) alone  (  ) with children   (  ) with spouse  (  ) with parents  (  ) other     Illicit Drug Use: (  ) never used  (  ) other _____     Tobacco Use:  (  ) never smoked  (  ) former smoker  (  ) current smoker  (  ) pack year  (  ) last cigarette date     Alcohol Use:      Sexual History:        MEDICATIONS  Home Medications:  Synthroid 25 mcg (0.025 mg) oral tablet: 1 tab(s) orally once a day (24 Oct 2018 09:41)      MEDICATIONS  (STANDING):    MEDICATIONS  (PRN):      ALLERGIES/INTOLERANCES:  Allergies  No Known Allergies    Intolerances      OBJECTIVE:  VITALS   Vital Signs Last 24 Hrs  T(C): 37.4 (24 Sep 2020 23:21), Max: 37.4 (24 Sep 2020 23:21)  T(F): 99.3 (24 Sep 2020 23:21), Max: 99.3 (24 Sep 2020 23:21)  HR: 87 (25 Sep 2020 03:51) (77 - 87)  BP: 118/98 (25 Sep 2020 03:51) (113/75 - 118/98)  BP(mean): --  RR: 20 (25 Sep 2020 03:51) (16 - 20)  SpO2: 99% (25 Sep 2020 03:51) (98% - 99%)    PHYSICAL EXAM:    General:  Constitutional: Obese Female, appears stated age, in no apparent distress including pain  Head: Normocephalic & atraumatic.    Neurological (>12):  MS: Awake, alert, oriented to person, place, situation, time. Normal affect. Follows all commands.    Language: Speech is clear, fluent with good repetition & comprehension (able to name objects: thumb, watch, pen)    CNs: PERRLA (R = 3mm, L = 3mm). VFF. EOMI no nystagmus, no diplopia. V1-3 intact to LT/pinprick, well developed masseter muscles b/l. No facial asymmetry b/l, full eye closure strength b/l. Hearing grossly normal (rubbing fingers) b/l. Symmetric palate elevation in midline. Gag reflex deferred. Head turning & shoulder shrug intact b/l. Tongue midline, normal movements, no atrophy.    Motor: Normal muscle bulk & tone. No noticeable tremor or seizure. No pronator drift. Tenderness to palpation midline thoracic spine, as well as lumbar tenderness to palpation (latissimus region bilaterally)              Deltoid	Biceps	Triceps	Wrist	   R	5	5	5	5	5			  L	5	5	5	5	5		    	H-Flex	H-Ext	K-Flex	K-Ext	D-Flex	P-Flex  R	5	5	5	5	5	5	 	   L	5	5	5	5	5	5		     Sensation: Decreased subjective sensation to LT and pinprick @ level of T5 and below. Temperature sensation intact    Cortical: Extinction on DSS (neglect): none    Reflexes:              Biceps(C5)       BR(C6)     Triceps(C7)               Patellar(L4)    Achilles(S1)    Plantar Resp  R	2	          2	             2		        2		    2		Down   L	2	          2	             2		        2		    2		Down     Coordination: No dysmetria to FTN    Gait: Normal Romberg. No postural instability. Normal stance and tandem gait.     LABORATORY:  CBC                       15.0   9.72  )-----------( 248      ( 25 Sep 2020 01:08 )             44.7     Chem     140  |  102  |  12  ----------------------------<  115<H>  3.5   |  25  |  0.59    Ca    9.9      25 Sep 2020 01:08    TPro  8.3  /  Alb  4.7  /  TBili  0.5  /  DBili  x   /  AST  16  /  ALT  17  /  AlkPhos  40  09-25    LFTs LIVER FUNCTIONS - ( 25 Sep 2020 01:08 )  Alb: 4.7 g/dL / Pro: 8.3 g/dL / ALK PHOS: 40 U/L / ALT: 17 U/L / AST: 16 U/L / GGT: x           Coagulopathy   Lipid Panel   A1c   Cardiac enzymes     U/A Urinalysis Basic - ( 25 Sep 2020 01:48 )    Color: Light Yellow / Appearance: Clear / S.017 / pH: x  Gluc: x / Ketone: Negative  / Bili: Negative / Urobili: Negative   Blood: x / Protein: Negative / Nitrite: Negative   Leuk Esterase: Moderate / RBC: 2 /hpf / WBC 14 /HPF   Sq Epi: x / Non Sq Epi: 9 /hpf / Bacteria: Few

## 2020-09-25 NOTE — ED CDU PROVIDER INITIAL DAY NOTE - PROGRESS NOTE DETAILS
Pt without any new complaints. Awaiting MRI. will continue to monitor. -Carley Mustafa PA-C Pt seen by neurology team. MRI shows abnormal cord signal with patchy enhancement extending from C3-4 through T8-9. Differential diagnosis includes infectious, inflammatory, and demyelinating processes.  Admission recommended. discussed with Dr. Fisher, will admit pt. -Carley Mustafa PA-C

## 2020-09-25 NOTE — ED CDU PROVIDER DISPOSITION NOTE - CLINICAL COURSE
Patient is a 39yo F with pmhx of Myasthenis Gravis s/p thymectomy, Hypothyroidism who presented to the ED and neurology was consulted for concern of LE numbness. Patient reports onset of symptoms 2 weeks ago with headache which resolved with tylenol. Patient then reports onset of chills, pain and burning sensation in her chest, abd and back 2 days later. She states that she saw her PCP who prescribed her naproxen and Gabapentin. The burning sensation resolve but the pain and chills persisted. She then reports of b/l LE numbness (Right then Left) beginning at the toes and progressing to her chest. Patient was seen in [facility] ED on 9/20/20 for these symptoms and discharged with recommendations to follow up with neurology outpatient. Patient was seen by Neurology, Dr. Jones, 2 days ago who recommended MRI of her thoracic spine. She states that she had to wait for insurance to go through and presented to the ED today for exacerbation of her symptoms. Of note patient was prescribed Gabapentin 100mg BID by her PCP and recommended to increase it to 100mg QID however she has not been taking it at all because it made her feel nauseous. Additionally patient has been prescribed levothyroxine for her hypothyroidism however she states that she has not been taking it for over 1 year and instead substituting with turmeric. Patient reports that onset of symptoms 2 weeks ago was after she lifted a heavy pot to place in a cupboard and felt back pain. She reports intermittent sharp shooting back pain but does not specify where it radiates. Patient denies headache, blurry/double vision, tinnitus, dizziness, lightheadedness, nausea, vomiting, weakness  In ED labs unrevealing, neurology saw pt advised CDU for MRI T/L spine concern for spinal pathology. pts neurologist Karen called requesting MRI brain concern for demyelinating process.  Pt had MRI done during CDU stay showing cord signal changes. Pt was admitted for further management.

## 2020-09-25 NOTE — H&P ADULT - HISTORY OF PRESENT ILLNESS
HPI:  Patient is a 39yo F with pmhx of Myasthenis Gravis s/p thymectomy, Hypothyroidism who presented to the ED and neurology was consulted for concern of LE numbness. Patient reports onset of symptoms 2 weeks ago with headache which resolved with tylenol. Patient then reports onset of chills, pain and burning sensation in her chest, abd and back 2 days later. She states that she saw her PCP who prescribed her naproxen and Gabapentin. The burning sensation resolve but the pain and chills persisted. She then reports of b/l LE numbness (Right then Left) beginning at the toes and progressing to her chest. Patient was seen in [facility] ED on 9/20/20 for these symptoms and discharged with recommendations to follow up with neurology outpatient. Patient was seen by Neurology, Dr. Jones, 2 days ago who recommended MRI of her thoracic spine. She states that she had to wait for insurance to go through and presented to the ED today for exacerbation of her symptoms. Of note patient was prescribed Gabapentin 100mg BID by her PCP and recommended to increase it to 100mg QID however she ha not been taking it at all because it made her feel nauseous. Additionally patient has been prescribed levothyroxine for her hypothyroidism however she states that she has not been taking it for over 1 year and instead substituting with turmeric. Patient reports that onset of symptoms 2 weeks ago was after she lifted a heavy pot to place in a cupboard and felt back pain. She reports intermittent sharp shooting back pain but does not specify where it radiates. Patient denies headache, blurry/double vision, tinnitus, dizziness, lightheadedness, nausea, vomiting, weakness.     Per chart review: patient reported taking nerds that contained marijuana on prior ED visit.

## 2020-09-25 NOTE — ED PROVIDER NOTE - OBJECTIVE STATEMENT
40F hx anxiety, hypothyroidism (not compliant on synthroid), hx of MG in remission s/p thymectomy p/w lower body numbness. States several days ago her she noted b/l tingling/numbness in her toes which began climbing up her legs, then abdomen, and now reports up to her nipples. States she denies weakness or sensation changes. Also endorsing back pain and chills. Seen in Ed last week for similar symptoms, referred to neurology and saw  this past week who referred her for MRI which has not been performed. Denies any recent f/c/n/v/d, no CP, SOB, abd pain, urinary sxs, no cough, insect bites, rashes.

## 2020-09-25 NOTE — ED PROVIDER NOTE - PHYSICAL EXAMINATION
Gen: WDWN, NAD  HEENT: EOMI, no nasal discharge, mucous membranes moist  CV: RRR, +S1/S2, no M/R/G  Resp: CTAB, no W/R/R  GI: Abdomen soft non-distended, NTTP, no masses  MSK: No open wounds, no bruising, no LE edema  Neuro: A&Ox4, following commands, moving all four extremities spontaneously, subjective LE numbness b/l w/ 5/5 strength b/l UE, LE, sensation intact throughout, slight LE hyperreflexia   Psych: appropriate mood, denies AH, VH, SI

## 2020-09-25 NOTE — ED ADULT NURSE REASSESSMENT NOTE - NS ED NURSE REASSESS COMMENT FT1
07.00 AM  received report from Rn Mere Valdivia. pt is observed for lower leg numbness for MRI  . Pt is A&OX 4 obeys commands. Pt is anxious GCS 15/15 PEARLA no facial droop strong upper & lower extremities   . Allyn N/V/D fever chills CP  sob  headache dizziness . Pt has no respiratory distress. speaks in clear sentences has stable vitals . Comfort care & safety measures maintained  IV site looks clean & dry no signs of infiltration noted  pt denies pain @ IV site  Pt looks comfortable  Pt is advised to call for help call bell with in the reach pt verbalized the understanding continue to monitor.  10.00 Pt went for MRI

## 2020-09-25 NOTE — H&P ADULT - ATTENDING COMMENTS
41yo woman with pmhx of Myasthenia Gravis s/p thymectomy, Hypothyroidism who presented with 2 weeks of progressive symptoms of headache, fever, chills, culminating in bilateral LE numbness and band sensation around her waist of burning and severe chills.   Pt was admitted iniitally to the CDU where she had an MRI which showed abnormal cord signal from C3/4- T8/9 consistent with a longitudinally extensive myelitis.   Pt admitted to the neurology service for further workup and management. Possible infectious myelitis, vs inflammatory (NMO, neurosarcoid, etc) especially given her hx of MG and hypothyroidism.   Pt to get 3 days of IV solumedrol 1g. .   Lumbar puncture.. CSF obtained. protein and glucose are normal, total cells elevated at 26, ESR mildly elevated at 17, culture negative.   Sending NMO, ACE, MOG, protein electrophoresis, viral panel, serum rheumatologic workup.   Restart home dose of Synthroid. Pt will need follow up with endocrine after discharge.  Pt states she does not want her information shared or anyone to know she is in the hospital.

## 2020-09-25 NOTE — ED PROVIDER NOTE - CLINICAL SUMMARY MEDICAL DECISION MAKING FREE TEXT BOX
40F w/ hypothyroid hx, distant hx MG no in remission p/w subjective b/l LE sensation loss, numbness, tingling, no motor deficits, differential can include metabolic vs structural involvment, inflammatory vs infectious. Plan for basic labs, urine testing, inflammatory markers, tsh, upreg, contact neurology for further reccs as indicated

## 2020-09-25 NOTE — H&P ADULT - ASSESSMENT
Plan:  [] Admit to [facility], Neurology (under Dr. Ames)  [] Start Gabapentin 100MG PO TID  [] PT/OT eval  [] Ativan 0.25MG x1  [] Solumedrol 1G IV daily x3 days  [] Lovenox DVT PPx  [] Protonix 40MG PO daily GI PPx    Case discussed with attending Neurologist Dr. Ames. Plan:  [] Admit to 4 [facility], Neurology (under Dr. Ames)  [] Start Gabapentin 100MG PO TID  [] PT/OT eval  [] Ativan 0.25MG x1  [] Solumedrol 1G IV daily x3 days  [] LP studies to follow up: CSF glucose, protein, cell count, CSF gram stain & culture, CSF PCR, ACE, lyme, [place], IgG index, oligoclonal bands, NMO, MOG, Protein electrophoresis, VZV  [] serum studies: NMO, MOG, Vitamin b12, Thiamine, Repeat TSH, Vitamin E, Copper, Ceruloplasmin, A1C, lipid profile, ESR, CRP, UPEP, SPEP  [] c/w synthroid 25mcg daily in AM  [] Lovenox DVT PPx  [] Protonix 40MG PO daily GI PPx    Case discussed with attending Neurologist Dr. Ames. Plan:  [] Admit to 4 [facility], Neurology (under Dr. Ames)  [] Start Gabapentin 100MG PO TID  [] PT/OT eval  [] Solumedrol 1G IV daily x3 days  [] LP studies to follow up: CSF glucose, protein, cell count, CSF gram stain & culture, CSF PCR, ACE, lyme, [place], IgG index, oligoclonal bands, NMO, MOG, Protein electrophoresis, VZV  [] serum studies: NMO, MOG, Vitamin b12, Thiamine, Repeat TSH, Vitamin E, Copper, Ceruloplasmin, A1C, lipid profile, ESR, CRP, UPEP, SPEP  [] c/w synthroid 25mcg daily in AM  [] Lovenox DVT PPx  [] Protonix 40MG PO daily GI PPx    Case discussed with attending Neurologist Dr. Ames.

## 2020-09-25 NOTE — ED CDU PROVIDER DISPOSITION NOTE - ATTENDING CONTRIBUTION TO CARE
Patient evaluated, case d/w CDU PA.  Diffuse LE paresthesias without weakness, no respiratory compromise (bradypnea, sob, gagging, hypophonia, etc).  MRI results noted.  Patient to be admitted to neurology.  LP performed in CDU by neuro PA.  --BMM

## 2020-09-25 NOTE — ED CDU PROVIDER INITIAL DAY NOTE - MEDICAL DECISION MAKING DETAILS
Yamile Landa MD 40 F w/ hx of myasthenia gravis w/p thymectomy, hypothyroidism, followed as outpt by Dr. Jones, presents to the ER w/ worsening numbness in the lower extremities, primarily the R lower extremity, she also reports that she has a burning sensation in the abdomen, w/ decreased sensation on the torso to the lower limb, pt sent to the CDU for MRI of the T/L spine and brain. Pt informed that symptoms are likely secondary to a neurologic process though pt states this is unlikely. Dispo pending results of rMRI and Dr. Jones requesting mri brain.

## 2020-09-25 NOTE — ED PROVIDER NOTE - PROGRESS NOTE DETAILS
Endorsed to Dr FIORDALIZA Desai MD, Facep Yamile Landa MD pt signed out to me pending results of neurology consult, unable to get in contact w/ pts private neurologist Dr. Clement, pt reports numbness in the R lower extremity

## 2020-09-25 NOTE — ED CDU PROVIDER INITIAL DAY NOTE - DETAILS
40y f p/w BL LE numbness, burning sensation to chest and back:  -MRI T/L spine w/wo, MRI brain w/wo, neuro checks, tele, lipid/a1c  -Vitamin b12, Thiamine, Repeat TSH, Vitamin E, Copper, Ceruloplasmin, A1C, lipid profile, ESR, CRP, UPEP, SPEP, HCG*  plan d/w Dr Landa

## 2020-09-25 NOTE — PROVIDER CONTACT NOTE (OTHER) - BACKGROUND
pt arrived to ED on 9/25 c/o lower extremity numbness  hx of myasthenia gravis  LP done at bedside at 1500 in ED  IV tylenol given to pt at 1742

## 2020-09-25 NOTE — ED ADULT NURSE NOTE - CHIEF COMPLAINT QUOTE
p/w c/o ble numbness for few dasy now, seen here in [facility] 4 days ago and was dc and was told to ff-up with Neurologist. Went to Neuro and was told to have an MRI. H/O anxiety and MRI

## 2020-09-25 NOTE — H&P ADULT - NSHPLABSRESULTS_GEN_ALL_CORE
15.0   9.72  )-----------( 248      ( 25 Sep 2020 01:08 )             44.7     09-25    140  |  102  |  12  ----------------------------<  115<H>  3.5   |  25  |  0.59    Ca    9.9      25 Sep 2020 01:08    TPro  8.3  /  Alb  4.7  /  TBili  0.5  /  DBili  x   /  AST  16  /  ALT  17  /  AlkPhos  40  09-25    < from: MR Cervical, Thoracic, Lumbar Spine w/wo IV Cont (09.25.20 @ 12:10) >    IMPRESSION: Mild degenerative changes involving the cervical and lumbarspine without disc herniation, spinal stenosis or cord compression. Abnormal cord signal with patchy enhancement extending from C3-4 through T8-9. Differential diagnosis includes infectious,- inflammatory and demyelinating processes. 15.0   9.72  )-----------( 248      ( 25 Sep 2020 01:08 )             44.7     09-25    140  |  102  |  12  ----------------------------<  115<H>  3.5   |  25  |  0.59    Ca    9.9      25 Sep 2020 01:08    TPro  8.3  /  Alb  4.7  /  TBili  0.5  /  DBili  x   /  AST  16  /  ALT  17  /  AlkPhos  40  09-25    < from: MR Cervical, Thoracic, Lumbar Spine w/wo IV Cont (09.25.20 @ 12:10) >    IMPRESSION: Mild degenerative changes involving the cervical and lumbar spine without disc herniation, spinal stenosis or cord compression. Abnormal cord signal with patchy enhancement extending from C3-4 through T8-9. Differential diagnosis includes infectious,- inflammatory and demyelinating processes.

## 2020-09-25 NOTE — ED PROVIDER NOTE - ATTENDING CONTRIBUTION TO CARE
Private Physician Bryn Briseno PCP, Urbano Hennessy  40y female pmh Anxiety, Hypothyroidism, MG in remission sp thymectomy. PT comes to ed complains of Kin lower extr numbness. From feet to under breast and back. PT was seen few days ago for back pain chills ed go was neg and pt dc home. Had follow up with Neuro advised to have MRI as opt. No fever , cough, NVD, rash. tick bite PE WDWN female awake alert normocephalic atraumatic neck supple chest clear cv no rubs, gallops or murmurs, chest clear anterior & posterior abd soft +bs. no mass guarding neuro gcs 15 speech fluent power 5/5 all extr, subjective decresased sensation abd,lower thrunk lower extr, + kin patella reflex 3+  Shahram Desai MD, Facep

## 2020-09-25 NOTE — H&P ADULT - NSICDXPASTSURGICALHX_GEN_ALL_CORE_FT
PAST SURGICAL HISTORY:  History of appendectomy 2001    MG status post thymectomy (myasthenia gravis) 2005

## 2020-09-25 NOTE — ED PROVIDER NOTE - NS ED ROS FT
Gen: Denies fever, weight loss  CV: Denies chest pain, palpitations  Skin: Denies rash, erythema, color changes  Resp: Denies SOB, cough  Endo: Denies sensitivity to heat, cold, increased urination  GI: Denies constipation, nausea, vomiting  Msk: + back pain, denies LE swelling   : Denies dysuria, increased frequency  Neuro: Denies LOC, + weakness + LE numbness   Psych: Denies hx of psych, hallucinations

## 2020-09-26 LAB
A1C WITH ESTIMATED AVERAGE GLUCOSE RESULT: 4.9 % — SIGNIFICANT CHANGE UP (ref 4–5.6)
A1C WITH ESTIMATED AVERAGE GLUCOSE RESULT: 5 % — SIGNIFICANT CHANGE UP (ref 4–5.6)
CSF PCR RESULT: SIGNIFICANT CHANGE UP
CULTURE RESULTS: SIGNIFICANT CHANGE UP
ERYTHROCYTE [SEDIMENTATION RATE] IN BLOOD: 18 MM/HR — HIGH (ref 0–15)
ESTIMATED AVERAGE GLUCOSE: 94 MG/DL — SIGNIFICANT CHANGE UP (ref 68–114)
ESTIMATED AVERAGE GLUCOSE: 97 MG/DL — SIGNIFICANT CHANGE UP (ref 68–114)
FOLATE SERPL-MCNC: 11 NG/ML — SIGNIFICANT CHANGE UP
GLUCOSE BLDC GLUCOMTR-MCNC: 112 MG/DL — HIGH (ref 70–99)
GLUCOSE BLDC GLUCOMTR-MCNC: 130 MG/DL — HIGH (ref 70–99)
GLUCOSE BLDC GLUCOMTR-MCNC: 143 MG/DL — HIGH (ref 70–99)
GLUCOSE BLDC GLUCOMTR-MCNC: 164 MG/DL — HIGH (ref 70–99)
HCG SERPL-ACNC: <2 MIU/ML — SIGNIFICANT CHANGE UP
HCG UR QL: NEGATIVE — SIGNIFICANT CHANGE UP
PROT CSF-MCNC: 44 MG/DL — SIGNIFICANT CHANGE UP (ref 15–45)
SARS-COV-2 IGG SERPL QL IA: NEGATIVE — SIGNIFICANT CHANGE UP
SARS-COV-2 IGM SERPL IA-ACNC: <3.8 AU/ML — SIGNIFICANT CHANGE UP
SPECIMEN SOURCE: SIGNIFICANT CHANGE UP
VIT B12 SERPL-MCNC: 406 PG/ML — SIGNIFICANT CHANGE UP (ref 232–1245)

## 2020-09-26 PROCEDURE — 99232 SBSQ HOSP IP/OBS MODERATE 35: CPT | Mod: GC

## 2020-09-26 RX ORDER — ACETAMINOPHEN 500 MG
650 TABLET ORAL DAILY
Refills: 0 | Status: DISCONTINUED | OUTPATIENT
Start: 2020-09-26 | End: 2020-09-29

## 2020-09-26 RX ORDER — SENNA PLUS 8.6 MG/1
2 TABLET ORAL AT BEDTIME
Refills: 0 | Status: DISCONTINUED | OUTPATIENT
Start: 2020-09-26 | End: 2020-09-29

## 2020-09-26 RX ORDER — DIPHENHYDRAMINE HCL 50 MG
50 CAPSULE ORAL DAILY
Refills: 0 | Status: DISCONTINUED | OUTPATIENT
Start: 2020-09-26 | End: 2020-09-26

## 2020-09-26 RX ORDER — ACETAMINOPHEN 500 MG
1000 TABLET ORAL ONCE
Refills: 0 | Status: COMPLETED | OUTPATIENT
Start: 2020-09-26 | End: 2020-09-26

## 2020-09-26 RX ORDER — IMMUNE GLOBULIN (HUMAN) 10 G/100ML
40 INJECTION INTRAVENOUS; SUBCUTANEOUS DAILY
Refills: 0 | Status: DISCONTINUED | OUTPATIENT
Start: 2020-09-26 | End: 2020-09-26

## 2020-09-26 RX ORDER — IMMUNE GLOBULIN (HUMAN) 10 G/100ML
40 INJECTION INTRAVENOUS; SUBCUTANEOUS DAILY
Refills: 0 | Status: DISCONTINUED | OUTPATIENT
Start: 2020-09-26 | End: 2020-09-29

## 2020-09-26 RX ORDER — IBUPROFEN 200 MG
600 TABLET ORAL EVERY 8 HOURS
Refills: 0 | Status: DISCONTINUED | OUTPATIENT
Start: 2020-09-26 | End: 2020-09-29

## 2020-09-26 RX ORDER — ACETAMINOPHEN 500 MG
650 TABLET ORAL EVERY 6 HOURS
Refills: 0 | Status: DISCONTINUED | OUTPATIENT
Start: 2020-09-26 | End: 2020-09-29

## 2020-09-26 RX ORDER — DIPHENHYDRAMINE HCL 50 MG
50 CAPSULE ORAL ONCE
Refills: 0 | Status: COMPLETED | OUTPATIENT
Start: 2020-09-26 | End: 2020-09-26

## 2020-09-26 RX ORDER — DIPHENHYDRAMINE HCL 50 MG
25 CAPSULE ORAL ONCE
Refills: 0 | Status: COMPLETED | OUTPATIENT
Start: 2020-09-26 | End: 2020-09-26

## 2020-09-26 RX ORDER — DIPHENHYDRAMINE HCL 50 MG
25 CAPSULE ORAL DAILY
Refills: 0 | Status: DISCONTINUED | OUTPATIENT
Start: 2020-09-26 | End: 2020-09-29

## 2020-09-26 RX ADMIN — GABAPENTIN 100 MILLIGRAM(S): 400 CAPSULE ORAL at 05:04

## 2020-09-26 RX ADMIN — Medication 600 MILLIGRAM(S): at 21:06

## 2020-09-26 RX ADMIN — Medication 25 MILLIGRAM(S): at 02:23

## 2020-09-26 RX ADMIN — PANTOPRAZOLE SODIUM 40 MILLIGRAM(S): 20 TABLET, DELAYED RELEASE ORAL at 05:04

## 2020-09-26 RX ADMIN — Medication 400 MILLIGRAM(S): at 02:23

## 2020-09-26 RX ADMIN — Medication 650 MILLIGRAM(S): at 16:10

## 2020-09-26 RX ADMIN — LIDOCAINE 1 PATCH: 4 CREAM TOPICAL at 20:39

## 2020-09-26 RX ADMIN — Medication 25 MICROGRAM(S): at 05:04

## 2020-09-26 RX ADMIN — Medication 1000 MILLIGRAM(S): at 03:00

## 2020-09-26 RX ADMIN — GABAPENTIN 100 MILLIGRAM(S): 400 CAPSULE ORAL at 21:07

## 2020-09-26 RX ADMIN — Medication 1: at 17:41

## 2020-09-26 RX ADMIN — LIDOCAINE 1 PATCH: 4 CREAM TOPICAL at 07:20

## 2020-09-26 RX ADMIN — IMMUNE GLOBULIN (HUMAN) 66.67 GRAM(S): 10 INJECTION INTRAVENOUS; SUBCUTANEOUS at 17:01

## 2020-09-26 RX ADMIN — GABAPENTIN 100 MILLIGRAM(S): 400 CAPSULE ORAL at 14:00

## 2020-09-26 RX ADMIN — ENOXAPARIN SODIUM 40 MILLIGRAM(S): 100 INJECTION SUBCUTANEOUS at 12:16

## 2020-09-26 RX ADMIN — Medication 400 MILLIGRAM(S): at 23:51

## 2020-09-26 RX ADMIN — SENNA PLUS 2 TABLET(S): 8.6 TABLET ORAL at 21:21

## 2020-09-26 RX ADMIN — Medication 600 MILLIGRAM(S): at 22:00

## 2020-09-26 RX ADMIN — Medication 25 MILLIGRAM(S): at 16:10

## 2020-09-26 RX ADMIN — Medication 5 MILLIGRAM(S): at 16:10

## 2020-09-26 RX ADMIN — LIDOCAINE 1 PATCH: 4 CREAM TOPICAL at 12:16

## 2020-09-26 NOTE — PROVIDER CONTACT NOTE (OTHER) - ASSESSMENT
Patient neurologically stable at this time. patient denies pain and is siting comfortably in chair. Patient Blood pressure 98/63 Hr 85; patient on IVIG

## 2020-09-26 NOTE — PHYSICAL THERAPY INITIAL EVALUATION ADULT - PERTINENT HX OF CURRENT PROBLEM, REHAB EVAL
39 y/o F, PMH: Myasthenis Gravis s/p thymectomy, Hypothyroidism. Pt presented for concern of LE numbness. Pt reports onset of symptoms 2 weeks ago with headache which resolved with tylenol. Pt then reports onset of chills, pain and burning sensation in her chest, abd and back 2 days later. She states that she saw her PCP who prescribed her naproxen and Gabapentin. The burning sensation resolve but the pain and chills persisted.

## 2020-09-26 NOTE — PROGRESS NOTE ADULT - ASSESSMENT
41yo woman with pmhx of Myasthenia Gravis s/p thymectomy, Hypothyroidism who presented with 2 weeks of progressive symptoms of headache, fever, chills, culminating in bilateral LE numbness and band sensation around her waist of burning and severe chills.   Pt was admitted initially to the CDU where she had an MRI which showed abnormal cord signal from C3/4- T8/9 consistent with a longitudinally extensive myelitis. Pt admitted to the neurology service for further workup and management. Possible infectious myelitis, vs inflammatory (NMO, neurosarcoid, etc) especially given her hx of MG and hypothyroidism.     Plan:  Medications:  [] Solumedrol 1G IV daily x3 days (9/25-/9/27)  [] IVIG 2 g/kg total divided over three days (9/26-9/28)  [] Protonix 40MG PO daily GI PPx  [] Gabapentin 100 mg PO TID  [] Senna 2 tablets QHS added for constipation  Labs:  [] LP studies to follow up: CSF glucose, protein, cell count, CSF gram stain & culture, CSF PCR, ACE, lyme, west nile, IgG index, oligoclonal bands, NMO, MOG, Protein electrophoresis, VZV  [] serum studies: NMO, MOG, Vitamin b12, Thiamine, Repeat TSH, Vitamin E, Copper, Ceruloplasmin, A1C, lipid profile, ESR, CRP, UPEP, SPEP  [] c/w synthroid 25mcg daily in AM  Other:  [] PT/OT eval: no skilled needs  DVT ppx:  [] Lovenox DVT PPx  Disposition:  [] Tentative discharge for 9/28 after completion of IVIG    Case discussed with neurology attending, Dr. Ames   39yo woman with pmhx of Myasthenia Gravis s/p thymectomy, Hypothyroidism who presented with 2 weeks of progressive symptoms of headache, fever, chills, culminating in bilateral LE numbness and band sensation around her waist of burning and severe chills.   Pt was admitted initially to the CDU where she had an MRI which showed abnormal cord signal from C3/4- T8/9 consistent with a longitudinally extensive myelitis. Pt admitted to the neurology service for further workup and management. CSF analysis significant for cell count of 26.  Possible infectious myelitis, vs inflammatory (NMO, neurosarcoid, etc) especially given her hx of MG and hypothyroidism.     Plan:  Medications:  [] Solumedrol 1G IV daily x3 days (9/25-/9/27)  [] IVIG 2 g/kg total divided over three days (9/26-9/28)  [] Protonix 40MG PO daily GI PPx  [] Gabapentin 100 mg PO TID  [] Senna 2 tablets QHS added for constipation  Labs:  [] LP studies to follow up: CSF culture, CSF PCR, ACE, lyme, west nile, IgG index, oligoclonal bands, NMO, MOG, Protein electrophoresis, VZV  [] serum studies: NMO, MOG, Vitamin b12, Thiamine, Repeat TSH, Vitamin E, Copper, Ceruloplasmin, A1C, lipid profile, ESR, CRP, UPEP, SPEP  [] Monitor FS while on steroids  [] c/w synthroid 25mcg daily in AM  Other:  [] PT/OT eval: no skilled needs  DVT ppx:  [] Lovenox DVT PPx  Disposition:  [] Tentative discharge for 9/28 after completion of IVIG    Case discussed with neurology attending, Dr. Ames   41yo woman with pmhx of Myasthenia Gravis s/p thymectomy, Hypothyroidism who presented with 2 weeks of progressive symptoms of headache, fever, chills, culminating in bilateral LE numbness and band sensation around her waist of burning and severe chills.   Pt was admitted initially to the CDU where she had an MRI which showed abnormal cord signal from C3/4- T8/9 consistent with a longitudinally extensive myelitis. Pt admitted to the neurology service for further workup and management. CSF analysis significant for cell count of 26.  Possible infectious myelitis, vs inflammatory (NMO, neurosarcoid, etc) especially given her hx of MG and hypothyroidism.     Plan:  Medications:  [] Solumedrol 1G IV daily x3 days (9/25-/9/27)  [] IVIG 2 g/kg total divided over three days (9/26-9/28)  [] Protonix 40MG PO daily GI PPx  [] Gabapentin 100 mg PO TID  [] Senna 2 tablets QHS added for constipation with dulcolax 5 mg BID prn  Labs:  [] LP studies to follow up: CSF culture, CSF PCR, ACE, lyme, west nile, IgG index, oligoclonal bands, NMO, MOG, Protein electrophoresis, VZV  [] serum studies: NMO, MOG, Vitamin b12, Thiamine, Repeat TSH, Vitamin E, Copper, Ceruloplasmin, A1C, lipid profile, ESR, CRP, UPEP, SPEP  [] Monitor FS while on steroids  [] c/w synthroid 25mcg daily in AM  Other:  [] PT/OT eval: no skilled needs  DVT ppx:  [] Lovenox DVT PPx  Disposition:  [] Tentative discharge for 9/28 after completion of IVIG    Case discussed with neurology attending, Dr. Ames

## 2020-09-26 NOTE — PHYSICAL THERAPY INITIAL EVALUATION ADULT - ADDITIONAL COMMENTS
Prior to admission pt reports being independent of all ADL's & functional mobility without AD. Pt resides in house with spouse and 2 children (19 & 19 y/o). No steps to enter, pt resides on first floor. (+) walk in shower with shower chair. (-) glasses. (+) driving.

## 2020-09-26 NOTE — PHYSICAL THERAPY INITIAL EVALUATION ADULT - PRECAUTIONS/LIMITATIONS, REHAB EVAL
CONT: She then reports of b/l LE numbness (Right then Left) beginning at the toes and progressing to her chest. Pt was seen in [facility] ED on 9/20/20 for these symptoms and discharged with recommendations to follow up with neurology outpatient. Patient was seen by Neurology, Dr. Jones, 2 days ago who recommended MRI of her thoracic spine. She states that she had to wait for insurance to go through and presented to the ED today for exacerbation of her symptoms. Of note pt was prescribed Gabapentin 100mg BID by her PCP and recommended to increase it to 100mg QID however she ha not been taking it at all because it made her feel nauseous. Additionally pt has been prescribed levothyroxine for her hypothyroidism however she states that she has not been taking it for over 1 year and instead substituting with turmeric. Patient reports that onset of symptoms 2 weeks ago was after she lifted a heavy pot to place in a cupboard and felt back pain. She reports intermittent sharp shooting back pain but does not specify where it radiates. Patient denies headache, blurry/double vision, tinnitus, dizziness, lightheadedness, nausea, vomiting, weakness. MR cervical, thoracic and lumbar spine 9/25: Mild degenerative changes involving the cervical and lumbar spine without disc herniation, spinal stenosis or cord compression. Abnormal cord signal with patchy enhancement extending from C3-4 through T8-9. Differential diagnosis includes infectious,- inflammatory and demyelinating processes. MRI head: Patchy white matter changes which do not enhance and do not demonstrate diffusion restriction. No abnormal parenchymal or leptomeningeal enhancement.

## 2020-09-26 NOTE — PROVIDER CONTACT NOTE (OTHER) - ACTION/TREATMENT ORDERED:
no further interventions at this time. continue to monitor patient; reassess BP in two hours in opposite arms

## 2020-09-26 NOTE — PROGRESS NOTE ADULT - SUBJECTIVE AND OBJECTIVE BOX
SUBJECTIVE: Patient seen and examined at the bedside. Patient endorses constipation. She also has numbness in her lower extremities and abdomen, which is currently unchanged. She denies any issues with urination. She had back discomfort overnight.     INTERVAL HISTORY:    PAST MEDICAL & SURGICAL HISTORY:  Myasthenia gravis    Anxiety    Hypothyroid    History of appendectomy      MG status post thymectomy (myasthenia gravis)        FAMILY HISTORY:  No pertinent family history in first degree relatives      SOCIAL HISTORY:   T/E/D:   Occupation:   Lives with:     MEDICATIONS (HOME):  Home Medications:  Synthroid 25 mcg (0.025 mg) oral tablet: 1 tab(s) orally once a day (25 Sep 2020 06:07)    MEDICATIONS  (STANDING):  dextrose 5%. 1000 milliLiter(s) (50 mL/Hr) IV Continuous <Continuous>  dextrose 50% Injectable 12.5 Gram(s) IV Push once  dextrose 50% Injectable 25 Gram(s) IV Push once  dextrose 50% Injectable 25 Gram(s) IV Push once  enoxaparin Injectable 40 milliGRAM(s) SubCutaneous daily  gabapentin 100 milliGRAM(s) Oral three times a day  immune   globulin 10% (GAMMAGARD) IVPB 40 Gram(s) IV Intermittent daily  insulin lispro (HumaLOG) corrective regimen sliding scale   SubCutaneous three times a day before meals  levothyroxine 25 MICROGram(s) Oral daily  lidocaine   Patch 1 Patch Transdermal daily  methylPREDNISolone sodium succinate IVPB 1000 milliGRAM(s) IV Intermittent daily  pantoprazole    Tablet 40 milliGRAM(s) Oral before breakfast  senna 2 Tablet(s) Oral at bedtime    MEDICATIONS  (PRN):  dextrose 40% Gel 15 Gram(s) Oral once PRN Blood Glucose LESS THAN 70 milliGRAM(s)/deciliter  glucagon  Injectable 1 milliGRAM(s) IntraMuscular once PRN Glucose LESS THAN 70 milligrams/deciliter    ALLERGIES/INTOLERANCES:  Allergies  No Known Allergies    Intolerances    VITALS & EXAMINATION:  Vital Signs Last 24 Hrs  T(C): 36.7 (26 Sep 2020 09:18), Max: 37.2 (25 Sep 2020 23:38)  T(F): 98.1 (26 Sep 2020 09:18), Max: 98.9 (25 Sep 2020 23:38)  HR: 117 (26 Sep 2020 12:00) (69 - 117)  BP: 119/82 (26 Sep 2020 12:00) (104/57 - 144/93)  BP(mean): --  RR: 18 (26 Sep 2020 09:18) (16 - 18)  SpO2: 100% (26 Sep 2020 12:00) (96% - 100%)    General: Female, appears stated age, in no apparent distress including pain    Neurological (>12):  MS: Awake, alert, oriented to person, place, situation, time. Follows all commands.    Language: Speech is clear, fluent with good comprehension     CNs:  EOMI. No facial asymmetry b/l. Head turning intact b/l. Tongue midline.    Fundoscopic: deferred     Motor: Normal muscle bulk & tone. No noticeable tremor. No motor drift in the extremities.	     Sensation: Decreased to LT in the b/l LE.     Reflexes: deferred    Gait: Normal Romberg. No postural instability. Normal stance and gait. Normal stride length and arm swing.      LABORATORY:  CBC                       15.0   9.72  )-----------( 248      ( 25 Sep 2020 01:08 )             44.7     Chem     140  |  102  |  12  ----------------------------<  115<H>  3.5   |  25  |  0.59    Ca    9.9      25 Sep 2020 01:08    TPro  x   /  Alb  4.7  /  TBili  x   /  DBili  x   /  AST  x   /  ALT  x   /  AlkPhos  x       LFTs LIVER FUNCTIONS - ( 25 Sep 2020 11:20 )  Alb: 4.7 g/dL / Pro: x     / ALK PHOS: x     / ALT: x     / AST: x     / GGT: x           Coagulopathy   Lipid Panel  Chol 236<H> <H> HDL 56 Trig 80  A1c   Cardiac enzymes     U/A Urinalysis Basic - ( 25 Sep 2020 01:48 )    Color: Light Yellow / Appearance: Clear / S.017 / pH: x  Gluc: x / Ketone: Negative  / Bili: Negative / Urobili: Negative   Blood: x / Protein: Negative / Nitrite: Negative   Leuk Esterase: Moderate / RBC: 2 /hpf / WBC 14 /HPF   Sq Epi: x / Non Sq Epi: 9 /hpf / Bacteria: Few      CSF  Immunological  Other    STUDIES & IMAGING:  Studies (EKG, EEG, EMG, etc):     Radiology (XR, CT, MR, U/S, TTE/KEIRA):

## 2020-09-26 NOTE — PHYSICAL THERAPY INITIAL EVALUATION ADULT - PLANNED THERAPY INTERVENTIONS, PT EVAL
GOAL: Pt will be able to Negotiate up/down 10 steps, independently, w/ unilateral rail/and appropriate assistive device, w/reciprocal/step-to gait pattern, in 2 weeks.

## 2020-09-27 LAB
CERULOPLASMIN SERPL-MCNC: 25 MG/DL — SIGNIFICANT CHANGE UP (ref 16–45)
GLUCOSE BLDC GLUCOMTR-MCNC: 125 MG/DL — HIGH (ref 70–99)
GLUCOSE BLDC GLUCOMTR-MCNC: 143 MG/DL — HIGH (ref 70–99)
GLUCOSE BLDC GLUCOMTR-MCNC: 149 MG/DL — HIGH (ref 70–99)
GLUCOSE BLDC GLUCOMTR-MCNC: 153 MG/DL — HIGH (ref 70–99)
RHEUMATOID FACT SERPL-ACNC: <10 IU/ML — SIGNIFICANT CHANGE UP (ref 0–13)

## 2020-09-27 PROCEDURE — 99232 SBSQ HOSP IP/OBS MODERATE 35: CPT | Mod: GC

## 2020-09-27 RX ORDER — SODIUM CHLORIDE 9 MG/ML
1000 INJECTION INTRAMUSCULAR; INTRAVENOUS; SUBCUTANEOUS ONCE
Refills: 0 | Status: DISCONTINUED | OUTPATIENT
Start: 2020-09-27 | End: 2020-09-29

## 2020-09-27 RX ORDER — MAGNESIUM SULFATE 500 MG/ML
1 VIAL (ML) INJECTION ONCE
Refills: 0 | Status: DISCONTINUED | OUTPATIENT
Start: 2020-09-27 | End: 2020-09-29

## 2020-09-27 RX ADMIN — Medication 1: at 18:06

## 2020-09-27 RX ADMIN — Medication 650 MILLIGRAM(S): at 14:11

## 2020-09-27 RX ADMIN — ENOXAPARIN SODIUM 40 MILLIGRAM(S): 100 INJECTION SUBCUTANEOUS at 12:48

## 2020-09-27 RX ADMIN — Medication 25 MILLIGRAM(S): at 12:48

## 2020-09-27 RX ADMIN — Medication 58 MILLIGRAM(S): at 06:17

## 2020-09-27 RX ADMIN — GABAPENTIN 100 MILLIGRAM(S): 400 CAPSULE ORAL at 22:13

## 2020-09-27 RX ADMIN — Medication 600 MILLIGRAM(S): at 06:59

## 2020-09-27 RX ADMIN — LIDOCAINE 1 PATCH: 4 CREAM TOPICAL at 00:19

## 2020-09-27 RX ADMIN — Medication 650 MILLIGRAM(S): at 09:44

## 2020-09-27 RX ADMIN — Medication 25 MICROGRAM(S): at 06:17

## 2020-09-27 RX ADMIN — Medication 650 MILLIGRAM(S): at 13:41

## 2020-09-27 RX ADMIN — IMMUNE GLOBULIN (HUMAN) 66.67 GRAM(S): 10 INJECTION INTRAVENOUS; SUBCUTANEOUS at 13:41

## 2020-09-27 RX ADMIN — Medication 650 MILLIGRAM(S): at 10:15

## 2020-09-27 RX ADMIN — GABAPENTIN 100 MILLIGRAM(S): 400 CAPSULE ORAL at 06:18

## 2020-09-27 RX ADMIN — Medication 1000 MILLIGRAM(S): at 00:53

## 2020-09-27 RX ADMIN — GABAPENTIN 100 MILLIGRAM(S): 400 CAPSULE ORAL at 12:48

## 2020-09-27 RX ADMIN — PANTOPRAZOLE SODIUM 40 MILLIGRAM(S): 20 TABLET, DELAYED RELEASE ORAL at 06:18

## 2020-09-27 RX ADMIN — SENNA PLUS 2 TABLET(S): 8.6 TABLET ORAL at 22:13

## 2020-09-27 NOTE — OCCUPATIONAL THERAPY INITIAL EVALUATION ADULT - DIAGNOSIS, OT EVAL
Pt p/w impairments in dynamic standing balance, sensation (numbness abdomen and BLE) impacting independence with ADLs and functional mobility.

## 2020-09-27 NOTE — PROGRESS NOTE ADULT - SUBJECTIVE AND OBJECTIVE BOX
SUBJECTIVE: No events overnight    MEDICATIONS (HOME):  Home Medications:  Synthroid 25 mcg (0.025 mg) oral tablet: 1 tab(s) orally once a day (25 Sep 2020 06:07)    MEDICATIONS  (STANDING):  acetaminophen   Tablet .. 650 milliGRAM(s) Oral daily  dextrose 5%. 1000 milliLiter(s) (50 mL/Hr) IV Continuous <Continuous>  dextrose 50% Injectable 12.5 Gram(s) IV Push once  dextrose 50% Injectable 25 Gram(s) IV Push once  dextrose 50% Injectable 25 Gram(s) IV Push once  diphenhydrAMINE 25 milliGRAM(s) Oral daily  enoxaparin Injectable 40 milliGRAM(s) SubCutaneous daily  gabapentin 100 milliGRAM(s) Oral three times a day  immune   globulin 10% (GAMMAGARD) IVPB 40 Gram(s) IV Intermittent daily  insulin lispro (HumaLOG) corrective regimen sliding scale   SubCutaneous three times a day before meals  levothyroxine 25 MICROGram(s) Oral daily  lidocaine   Patch 1 Patch Transdermal daily  magnesium sulfate  IVPB 1 Gram(s) IV Intermittent once  methylPREDNISolone sodium succinate IVPB 1000 milliGRAM(s) IV Intermittent daily  pantoprazole    Tablet 40 milliGRAM(s) Oral before breakfast  senna 2 Tablet(s) Oral at bedtime  sodium chloride 0.9% Bolus 1000 milliLiter(s) IV Bolus once    MEDICATIONS  (PRN):  acetaminophen   Tablet .. 650 milliGRAM(s) Oral every 6 hours PRN Mild Pain (1 - 3)  bisacodyl 5 milliGRAM(s) Oral every 12 hours PRN Constipation  dextrose 40% Gel 15 Gram(s) Oral once PRN Blood Glucose LESS THAN 70 milliGRAM(s)/deciliter  glucagon  Injectable 1 milliGRAM(s) IntraMuscular once PRN Glucose LESS THAN 70 milligrams/deciliter  ibuprofen  Tablet. 600 milliGRAM(s) Oral every 8 hours PRN Moderate Pain (4 - 6)    ALLERGIES/INTOLERANCES:  Allergies  No Known Allergies    Intolerances    VITALS & EXAMINATION:  Vital Signs Last 24 Hrs  T(C): 36.7 (27 Sep 2020 12:10), Max: 37.2 (26 Sep 2020 16:43)  T(F): 98 (27 Sep 2020 12:10), Max: 99 (26 Sep 2020 16:43)  HR: 76 (27 Sep 2020 12:10) (72 - 116)  BP: 107/63 (27 Sep 2020 12:10) (98/63 - 125/72)  BP(mean): --  RR: 18 (27 Sep 2020 12:10) (18 - 19)  SpO2: 98% (27 Sep 2020 12:10) (94% - 99%)    Neurological Exam:    Mental Status: Oriented to self, place and date.  Attention intact.  Comprehension, expression, prosidy, and articulation of speech grossly intact.  Registration intact.  Recent and remote memory grossly intact.      Cranial Nerves: PERRL, EOMI, VFF, no nystagmus or diplopia.  CN V1-3 intact to light touch.  No facial asymmetry.  Hearing intact.  Tongue midline.  Sternocleidomastoid and Trapezius intact bilaterally.    Motor:     Tone: normal              Strength:   5/5 x 4    Pronator drift: none                   No truncal ataxia.      Tremor: No resting, postural or action tremor.  No myoclonus.    Sensation: decreased LT in b/l LE    Gait: normal and stable on 30 foot walk    LABORATORY:  CBC   Chem       LFTs   Coagulopathy   Lipid Panel 09-25 Chol 236<H> <H> HDL 56 Trig 80  A1c   Cardiac enzymes     U/A SUBJECTIVE: No events overnight. Pt complains of subjective numbness in her legs and feet.    MEDICATIONS (HOME):  Home Medications:  Synthroid 25 mcg (0.025 mg) oral tablet: 1 tab(s) orally once a day (25 Sep 2020 06:07)    MEDICATIONS  (STANDING):  acetaminophen   Tablet .. 650 milliGRAM(s) Oral daily  dextrose 5%. 1000 milliLiter(s) (50 mL/Hr) IV Continuous <Continuous>  dextrose 50% Injectable 12.5 Gram(s) IV Push once  dextrose 50% Injectable 25 Gram(s) IV Push once  dextrose 50% Injectable 25 Gram(s) IV Push once  diphenhydrAMINE 25 milliGRAM(s) Oral daily  enoxaparin Injectable 40 milliGRAM(s) SubCutaneous daily  gabapentin 100 milliGRAM(s) Oral three times a day  immune   globulin 10% (GAMMAGARD) IVPB 40 Gram(s) IV Intermittent daily  insulin lispro (HumaLOG) corrective regimen sliding scale   SubCutaneous three times a day before meals  levothyroxine 25 MICROGram(s) Oral daily  lidocaine   Patch 1 Patch Transdermal daily  magnesium sulfate  IVPB 1 Gram(s) IV Intermittent once  methylPREDNISolone sodium succinate IVPB 1000 milliGRAM(s) IV Intermittent daily  pantoprazole    Tablet 40 milliGRAM(s) Oral before breakfast  senna 2 Tablet(s) Oral at bedtime  sodium chloride 0.9% Bolus 1000 milliLiter(s) IV Bolus once    MEDICATIONS  (PRN):  acetaminophen   Tablet .. 650 milliGRAM(s) Oral every 6 hours PRN Mild Pain (1 - 3)  bisacodyl 5 milliGRAM(s) Oral every 12 hours PRN Constipation  dextrose 40% Gel 15 Gram(s) Oral once PRN Blood Glucose LESS THAN 70 milliGRAM(s)/deciliter  glucagon  Injectable 1 milliGRAM(s) IntraMuscular once PRN Glucose LESS THAN 70 milligrams/deciliter  ibuprofen  Tablet. 600 milliGRAM(s) Oral every 8 hours PRN Moderate Pain (4 - 6)    ALLERGIES/INTOLERANCES:  Allergies  No Known Allergies    Intolerances    VITALS & EXAMINATION:  Vital Signs Last 24 Hrs  T(C): 36.7 (27 Sep 2020 12:10), Max: 37.2 (26 Sep 2020 16:43)  T(F): 98 (27 Sep 2020 12:10), Max: 99 (26 Sep 2020 16:43)  HR: 76 (27 Sep 2020 12:10) (72 - 116)  BP: 107/63 (27 Sep 2020 12:10) (98/63 - 125/72)  BP(mean): --  RR: 18 (27 Sep 2020 12:10) (18 - 19)  SpO2: 98% (27 Sep 2020 12:10) (94% - 99%)    Neurological Exam:    Mental Status: Oriented to self, place and date.  Attention intact.  Comprehension, expression, prosidy, and articulation of speech grossly intact.  Registration intact.  Recent and remote memory grossly intact.      Cranial Nerves: PERRL, EOMI, VFF, no nystagmus or diplopia.  CN V1-3 intact to light touch.  No facial asymmetry.  Hearing intact.  Tongue midline.  Sternocleidomastoid and Trapezius intact bilaterally.    Motor:   Tone: normal      Strength:   5/5 x 4  Pronator drift: none                 No truncal ataxia.    Tremor: No resting, postural or action tremor.  No myoclonus.  Sensation: decreased LT in b/l LE, no sensory level on exam  Gait: normal and stable on 30 foot walk    LABORATORY:  CBC   Chem       LFTs   Coagulopathy   Lipid Panel 09-25 Chol 236<H> <H> HDL 56 Trig 80  A1c   Cardiac enzymes     U/A

## 2020-09-27 NOTE — OCCUPATIONAL THERAPY INITIAL EVALUATION ADULT - PERTINENT HX OF CURRENT PROBLEM, REHAB EVAL
40F with pmhx of Myasthenia Gravis s/p thymectomy, Hypothyroidism who presented with 2 weeks of progressive symptoms of headache, fever, chills, culminating in bilateral LE numbness and band sensation around her waist of burning and severe chills. (CONT BELOW)

## 2020-09-27 NOTE — OCCUPATIONAL THERAPY INITIAL EVALUATION ADULT - PRECAUTIONS/LIMITATIONS, REHAB EVAL
Pt was admitted initially to the CDU where she had an MRI which showed abnormal cord signal from C3/4- T8/9 consistent with a longitudinally extensive myelitis. Pt admitted to the neurology service for further workup and management. CSF analysis significant for cell count of 26.  Possible infectious myelitis, vs inflammatory (NMO, neurosarcoid, etc) especially given her hx of MG and hypothyroidism. MR cervical, thoracic and lumbar spine 9/25: Mild degenerative changes involving the cervical and lumbar spine without disc herniation, spinal stenosis or cord compression. Abnormal cord signal with patchy enhancement extending from C3-4 through T8-9. Differential diagnosis includes infectious,- inflammatory and demyelinating processes. MRI head: Patchy white matter changes which do not enhance and do not demonstrate diffusion restriction. No abnormal parenchymal or leptomeningeal enhancement.

## 2020-09-27 NOTE — PROGRESS NOTE ADULT - ASSESSMENT
39yo woman with pmhx of Myasthenia Gravis s/p thymectomy, Hypothyroidism who presented with 2 weeks of progressive symptoms of headache, fever, chills, culminating in bilateral LE numbness and band sensation around her waist of burning and severe chills.   Pt was admitted initially to the CDU where she had an MRI which showed abnormal cord signal from C3/4- T8/9 consistent with a longitudinally extensive myelitis. Pt admitted to the neurology service for further workup and management. CSF analysis significant for cell count of 26.  Possible infectious myelitis, vs inflammatory (NMO, neurosarcoid, etc) especially given her hx of MG and hypothyroidism.     Plan:  Medications:  [] tylenol 650 mg PO x 1 for headache  [] 1g magnesium x 1 for headache  [] 1L NS x 1 for headache  [] Solumedrol 1G IV daily x3 days (9/25-/9/27)  [] IVIG 2 g/kg total divided over three days (9/26-9/28)  [] Protonix 40MG PO daily GI PPx  [] Gabapentin 100 mg PO TID  [] Senna 2 tablets QHS added for constipation with dulcolax 5 mg BID prn  Labs:  [] LP studies to follow up: CSF culture, CSF PCR, ACE, lyme, west nile, IgG index, oligoclonal bands, NMO, MOG, Protein electrophoresis, VZV  [] serum studies: NMO, MOG, Vitamin b12, Thiamine, Repeat TSH, Vitamin E, Copper, Ceruloplasmin, A1C, lipid profile, ESR, CRP, UPEP, SPEP  [] Monitor FS while on steroids  [] c/w synthroid 25mcg daily in AM  Other:  [] PT/OT eval: no skilled needs  DVT ppx:  [] Lovenox DVT PPx  Disposition:  [] Tentative discharge for 9/28 after completion of IVIG

## 2020-09-28 ENCOUNTER — TRANSCRIPTION ENCOUNTER (OUTPATIENT)
Age: 40
End: 2020-09-28

## 2020-09-28 LAB
A-TOCOPHEROL VIT E SERPL-MCNC: 12.1 MG/L — SIGNIFICANT CHANGE UP (ref 7–25.1)
ACE SERPL-CCNC: 36 U/L — SIGNIFICANT CHANGE UP (ref 14–82)
ALBUMIN CSF-MCNC: 27.9 MG/DL — HIGH (ref 14–25)
ALBUMIN SERPL ELPH-MCNC: 3723 MG/DL — SIGNIFICANT CHANGE UP (ref 3500–5200)
ALBUMIN SERPL ELPH-MCNC: 4.1 G/DL — SIGNIFICANT CHANGE UP (ref 3.3–5)
ALP SERPL-CCNC: 34 U/L — LOW (ref 40–120)
ALT FLD-CCNC: 12 U/L — SIGNIFICANT CHANGE UP (ref 10–45)
ANION GAP SERPL CALC-SCNC: 8 MMOL/L — SIGNIFICANT CHANGE UP (ref 5–17)
AST SERPL-CCNC: 12 U/L — SIGNIFICANT CHANGE UP (ref 10–40)
BETA+GAMMA TOCOPHEROL SERPL-MCNC: 1.6 MG/L — SIGNIFICANT CHANGE UP (ref 0.5–5.5)
BILIRUB SERPL-MCNC: 0.6 MG/DL — SIGNIFICANT CHANGE UP (ref 0.2–1.2)
BUN SERPL-MCNC: 19 MG/DL — SIGNIFICANT CHANGE UP (ref 7–23)
CALCIUM SERPL-MCNC: 8.9 MG/DL — SIGNIFICANT CHANGE UP (ref 8.4–10.5)
CHLORIDE SERPL-SCNC: 104 MMOL/L — SIGNIFICANT CHANGE UP (ref 96–108)
CO2 SERPL-SCNC: 25 MMOL/L — SIGNIFICANT CHANGE UP (ref 22–31)
CREAT SERPL-MCNC: 0.53 MG/DL — SIGNIFICANT CHANGE UP (ref 0.5–1.3)
CREATININE, URINE RESULT: 32 MG/DL — SIGNIFICANT CHANGE UP
CULTURE RESULTS: NO GROWTH — SIGNIFICANT CHANGE UP
DSDNA AB SER-ACNC: 27 IU/ML — SIGNIFICANT CHANGE UP
GLUCOSE BLDC GLUCOMTR-MCNC: 117 MG/DL — HIGH (ref 70–99)
GLUCOSE BLDC GLUCOMTR-MCNC: 119 MG/DL — HIGH (ref 70–99)
GLUCOSE BLDC GLUCOMTR-MCNC: 124 MG/DL — HIGH (ref 70–99)
GLUCOSE SERPL-MCNC: 115 MG/DL — HIGH (ref 70–99)
HCT VFR BLD CALC: 41.2 % — SIGNIFICANT CHANGE UP (ref 34.5–45)
HGB BLD-MCNC: 13.6 G/DL — SIGNIFICANT CHANGE UP (ref 11.5–15.5)
IGG CSF-MCNC: 5 MG/DL — HIGH
IGG FLD-MCNC: 1401 MG/DL — SIGNIFICANT CHANGE UP (ref 610–1660)
IGG SYNTH RATE SER+CSF CALC-MRATE: -3.5 MG/DAY — SIGNIFICANT CHANGE UP
IGG/ALB CLEAR SER+CSF-RTO: 0.5 — SIGNIFICANT CHANGE UP
IGG/ALB CSF: 0.18 RATIO — SIGNIFICANT CHANGE UP
IGG/ALB SER: 0.38 RATIO — SIGNIFICANT CHANGE UP
MCHC RBC-ENTMCNC: 29 PG — SIGNIFICANT CHANGE UP (ref 27–34)
MCHC RBC-ENTMCNC: 33 GM/DL — SIGNIFICANT CHANGE UP (ref 32–36)
MCV RBC AUTO: 87.8 FL — SIGNIFICANT CHANGE UP (ref 80–100)
NRBC # BLD: 0 /100 WBCS — SIGNIFICANT CHANGE UP (ref 0–0)
PLATELET # BLD AUTO: 237 K/UL — SIGNIFICANT CHANGE UP (ref 150–400)
POTASSIUM SERPL-MCNC: 4.3 MMOL/L — SIGNIFICANT CHANGE UP (ref 3.5–5.3)
POTASSIUM SERPL-SCNC: 4.3 MMOL/L — SIGNIFICANT CHANGE UP (ref 3.5–5.3)
PROT ?TM UR-MCNC: 5 MG/DL — SIGNIFICANT CHANGE UP (ref 0–12)
PROT SERPL-MCNC: 8.7 G/DL — HIGH (ref 6–8.3)
RBC # BLD: 4.69 M/UL — SIGNIFICANT CHANGE UP (ref 3.8–5.2)
RBC # FLD: 13.3 % — SIGNIFICANT CHANGE UP (ref 10.3–14.5)
SODIUM SERPL-SCNC: 137 MMOL/L — SIGNIFICANT CHANGE UP (ref 135–145)
SPECIMEN SOURCE: SIGNIFICANT CHANGE UP
WBC # BLD: 16.33 K/UL — HIGH (ref 3.8–10.5)
WBC # FLD AUTO: 16.33 K/UL — HIGH (ref 3.8–10.5)

## 2020-09-28 PROCEDURE — 99231 SBSQ HOSP IP/OBS SF/LOW 25: CPT | Mod: GC

## 2020-09-28 PROCEDURE — 74018 RADEX ABDOMEN 1 VIEW: CPT | Mod: 26

## 2020-09-28 RX ORDER — SIMETHICONE 80 MG/1
80 TABLET, CHEWABLE ORAL
Refills: 0 | Status: DISCONTINUED | OUTPATIENT
Start: 2020-09-29 | End: 2020-09-29

## 2020-09-28 RX ORDER — LEVOTHYROXINE SODIUM 125 MCG
1 TABLET ORAL
Qty: 0 | Refills: 0 | DISCHARGE

## 2020-09-28 RX ORDER — PANTOPRAZOLE SODIUM 20 MG/1
1 TABLET, DELAYED RELEASE ORAL
Qty: 30 | Refills: 0
Start: 2020-09-28 | End: 2020-10-27

## 2020-09-28 RX ORDER — SENNA PLUS 8.6 MG/1
2 TABLET ORAL
Qty: 60 | Refills: 3
Start: 2020-09-28 | End: 2021-01-25

## 2020-09-28 RX ORDER — LEVOTHYROXINE SODIUM 125 MCG
1 TABLET ORAL
Qty: 30 | Refills: 3
Start: 2020-09-28 | End: 2021-01-25

## 2020-09-28 RX ORDER — SIMETHICONE 80 MG/1
80 TABLET, CHEWABLE ORAL ONCE
Refills: 0 | Status: DISCONTINUED | OUTPATIENT
Start: 2020-09-28 | End: 2020-09-29

## 2020-09-28 RX ORDER — LANOLIN ALCOHOL/MO/W.PET/CERES
3 CREAM (GRAM) TOPICAL ONCE
Refills: 0 | Status: COMPLETED | OUTPATIENT
Start: 2020-09-28 | End: 2020-09-28

## 2020-09-28 RX ORDER — GABAPENTIN 400 MG/1
1 CAPSULE ORAL
Qty: 90 | Refills: 3
Start: 2020-09-28 | End: 2021-01-25

## 2020-09-28 RX ORDER — LIDOCAINE 4 G/100G
1 CREAM TOPICAL
Qty: 30 | Refills: 3
Start: 2020-09-28 | End: 2021-01-25

## 2020-09-28 RX ADMIN — Medication 650 MILLIGRAM(S): at 17:00

## 2020-09-28 RX ADMIN — IMMUNE GLOBULIN (HUMAN) 66.67 GRAM(S): 10 INJECTION INTRAVENOUS; SUBCUTANEOUS at 15:51

## 2020-09-28 RX ADMIN — Medication 650 MILLIGRAM(S): at 22:35

## 2020-09-28 RX ADMIN — Medication 325 MILLIGRAM(S): at 15:50

## 2020-09-28 RX ADMIN — Medication 650 MILLIGRAM(S): at 15:52

## 2020-09-28 RX ADMIN — Medication 58 MILLIGRAM(S): at 05:24

## 2020-09-28 RX ADMIN — GABAPENTIN 100 MILLIGRAM(S): 400 CAPSULE ORAL at 16:43

## 2020-09-28 RX ADMIN — GABAPENTIN 100 MILLIGRAM(S): 400 CAPSULE ORAL at 07:25

## 2020-09-28 RX ADMIN — LIDOCAINE 1 PATCH: 4 CREAM TOPICAL at 02:54

## 2020-09-28 RX ADMIN — Medication 25 MICROGRAM(S): at 05:24

## 2020-09-28 RX ADMIN — SENNA PLUS 2 TABLET(S): 8.6 TABLET ORAL at 21:49

## 2020-09-28 RX ADMIN — Medication 3 MILLIGRAM(S): at 02:31

## 2020-09-28 RX ADMIN — Medication 650 MILLIGRAM(S): at 09:41

## 2020-09-28 RX ADMIN — Medication 650 MILLIGRAM(S): at 10:33

## 2020-09-28 RX ADMIN — Medication 650 MILLIGRAM(S): at 23:05

## 2020-09-28 RX ADMIN — GABAPENTIN 100 MILLIGRAM(S): 400 CAPSULE ORAL at 21:49

## 2020-09-28 RX ADMIN — Medication 650 MILLIGRAM(S): at 17:33

## 2020-09-28 RX ADMIN — PANTOPRAZOLE SODIUM 40 MILLIGRAM(S): 20 TABLET, DELAYED RELEASE ORAL at 09:34

## 2020-09-28 RX ADMIN — ENOXAPARIN SODIUM 40 MILLIGRAM(S): 100 INJECTION SUBCUTANEOUS at 15:49

## 2020-09-28 RX ADMIN — Medication 600 MILLIGRAM(S): at 20:53

## 2020-09-28 RX ADMIN — Medication 25 MILLIGRAM(S): at 15:50

## 2020-09-28 RX ADMIN — Medication 600 MILLIGRAM(S): at 20:23

## 2020-09-28 NOTE — DISCHARGE NOTE PROVIDER - NSDCCPCAREPLAN_GEN_ALL_CORE_FT
PRINCIPAL DISCHARGE DIAGNOSIS  Diagnosis: Transverse myelitis  Assessment and Plan of Treatment: Please take the prednisone steroids as directed.   Please follow up with Neurology   [address] [place] [address]  [phone]      SECONDARY DISCHARGE DIAGNOSES  Diagnosis: Hypothyroidism  Assessment and Plan of Treatment: Please continue to take your synthroid.  Follow up with your PCP and Endocrinology.     PRINCIPAL DISCHARGE DIAGNOSIS  Diagnosis: Transverse myelitis  Assessment and Plan of Treatment: Please take the prednisone steroids as directed:  9/30: Take 6 tabs  10/1: Take 5 tabs  10/2: Take 5 tabs  10/3: Take 4 tabs  10/4: Take 4 tabs  10/5: Take 3 tabs  10/6: Take 3 tabs  10/7: Take 2 tabs  10/8: Take 2 tabs  10/9: Take 1 tab  10/10: Take 1 tab  10/11: STOP  Please follow up with Neurology   [address] [place] [address]  [phone]      SECONDARY DISCHARGE DIAGNOSES  Diagnosis: Hypothyroidism  Assessment and Plan of Treatment: Please continue to take your synthroid.  Follow up with your PCP and Endocrinology.

## 2020-09-28 NOTE — DISCHARGE NOTE PROVIDER - HOSPITAL COURSE
Patient is a 41yo F with pmhx of Myasthenis Gravis s/p thymectomy, Hypothyroidism who presented to the ED and neurology was consulted for concern of LE numbness. Patient reports onset of symptoms 2 weeks ago with headache which resolved with tylenol. Patient then reports onset of chills, pain and burning sensation in her chest, abd and back 2 days later. She states that she saw her PCP who prescribed her naproxen and Gabapentin. The burning sensation resolve but the pain and chills persisted. She then reports of b/l LE numbness (Right then Left) beginning at the toes and progressing to her chest. Patient was seen in [facility] ED on 9/20/20 for these symptoms and discharged with recommendations to follow up with neurology outpatient. Patient was seen by Neurology, Dr. Jones, 2 days ago who recommended MRI of her thoracic spine. She states that she had to wait for insurance to go through and presented to the ED today for exacerbation of her symptoms. Of note patient was prescribed Gabapentin 100mg BID by her PCP and recommended to increase it to 100mg QID however she ha not been taking it at all because it made her feel nauseous. Additionally patient has been prescribed levothyroxine for her hypothyroidism however she states that she has not been taking it for over 1 year and instead substituting with turmeric. Patient reports that onset of symptoms 2 weeks ago was after she lifted a heavy pot to place in a cupboard and felt back pain. She reports intermittent sharp shooting back pain but does not specify where it radiates. Patient denies headache, blurry/double vision, tinnitus, dizziness, lightheadedness, nausea, vomiting, weakness.     Per chart review: patient reported taking nerds that contained marijuana on prior ED visit.    Hospital course includes neuroimaging which demonstrated     MR Cervical, Thoracic, Lumbar Spine w/wo IV Cont (09.25.20 @ 12:11)  IMPRESSION: Mild degenerative changes involving the cervical and lumbarspine without disc herniation, spinal stenosis or cord compression. Abnormal cord signal with patchy enhancement extending from C3-4 through T8-9. Differential diagnosis includes infectious,- inflammatory and demyelinating processes.    MR Head w/wo IV Cont (09.25.20 @ 12:09)   IMPRESSION: Patchy white matter changes which do not enhance and do not demonstrate diffusion restriction. No abnormal parenchymal or leptomeningeal enhancement.    Patient received 3 day course of Solumedrol (9/25-27) and IVIG (9/26-9/28). CSF studies from Lumbar puncture demonstrated 26 nucleated cells with PMN predominance.     PT/OT determined patient does not have skilled PT needs and is stable to go home with assist.    Patient neurologically stable for discharge to home after last dose of IVIG. Patient is a 39yo F with pmhx of Myasthenis Gravis s/p thymectomy, Hypothyroidism who presented to the ED and neurology was consulted for concern of LE numbness. Patient reports onset of symptoms 2 weeks ago with headache which resolved with tylenol. Patient then reports onset of chills, pain and burning sensation in her chest, abd and back 2 days later. She states that she saw her PCP who prescribed her naproxen and Gabapentin. The burning sensation resolve but the pain and chills persisted. She then reports of b/l LE numbness (Right then Left) beginning at the toes and progressing to her chest. Patient was seen in [facility] ED on 9/20/20 for these symptoms and discharged with recommendations to follow up with neurology outpatient. Patient was seen by Neurology, Dr. Jones, 2 days ago who recommended MRI of her thoracic spine. She states that she had to wait for insurance to go through and presented to the ED today for exacerbation of her symptoms. Of note patient was prescribed Gabapentin 100mg BID by her PCP and recommended to increase it to 100mg QID however she ha not been taking it at all because it made her feel nauseous. Additionally patient has been prescribed levothyroxine for her hypothyroidism however she states that she has not been taking it for over 1 year and instead substituting with turmeric. Patient reports that onset of symptoms 2 weeks ago was after she lifted a heavy pot to place in a cupboard and felt back pain. She reports intermittent sharp shooting back pain but does not specify where it radiates. Patient denies headache, blurry/double vision, tinnitus, dizziness, lightheadedness, nausea, vomiting, weakness.     Per chart review: patient reported taking nerds that contained marijuana on prior ED visit.    Hospital course includes neuroimaging which demonstrated     MR Cervical, Thoracic, Lumbar Spine w/wo IV Cont (09.25.20 @ 12:11)  IMPRESSION: Mild degenerative changes involving the cervical and lumbarspine without disc herniation, spinal stenosis or cord compression. Abnormal cord signal with patchy enhancement extending from C3-4 through T8-9. Differential diagnosis includes infectious,- inflammatory and demyelinating processes.    MR Head w/wo IV Cont (09.25.20 @ 12:09)   IMPRESSION: Patchy white matter changes which do not enhance and do not demonstrate diffusion restriction. No abnormal parenchymal or leptomeningeal enhancement.    Patient received 3 day course of Solumedrol (9/25-27) and IVIG (9/26-9/28). CSF studies from Lumbar puncture demonstrated 26 nucleated cells with PMN predominance.     PT/OT determined patient does not have skilled PT needs and is stable to go home with assist.    Patient neurologically stable for discharge to home after last dose of IVIG.       Neurology Attending    Patient optimized for discharge and will follow up with Dr. Resendiz or Dr. Mcnally at [address] [place] [address] in [place] within 2-4 weeks of discharge.     All questions answered and patient understood plan .

## 2020-09-28 NOTE — DISCHARGE NOTE PROVIDER - NSDCFUADDAPPT_GEN_ALL_CORE_FT
[facility] for Physical therapy - you were given outpatient physical therapy script.  [address] [facility] [id number], [place] [address]  [phone]

## 2020-09-28 NOTE — DISCHARGE NOTE PROVIDER - NSDCMRMEDTOKEN_GEN_ALL_CORE_FT
bisacodyl 5 mg oral delayed release tablet: 1 tab(s) orally every 12 hours, As needed, Constipation  gabapentin 100 mg oral capsule: 1 cap(s) orally 3 times a day  levothyroxine 25 mcg (0.025 mg) oral tablet: 1 tab(s) orally once a day  lidocaine 5% topical film: Apply topically to affected area once a day  pantoprazole 40 mg oral delayed release tablet: 1 tab(s) orally once a day (before a meal)  predniSONE 10 mg oral tablet: TAPER  9/28: Take 6 tabs  9/29: Take 6 tabs  9/30: Take 5 tabs  10/1: Take 5 tabs  10/2: Take 4 tabs  10/3: Take 4 tabs  10/4: Take 3 tabs  10/5: Take 3 tabs  10/6: Take 2 tabs  10/7: Take 2 tabs  10/8: STOP   senna oral tablet: 2 tab(s) orally once a day (at bedtime)   bisacodyl 5 mg oral delayed release tablet: 1 tab(s) orally every 12 hours, As needed, Constipation  gabapentin 100 mg oral capsule: 1 cap(s) orally 3 times a day  levothyroxine 25 mcg (0.025 mg) oral tablet: 1 tab(s) orally once a day  lidocaine 5% topical film: Apply topically to affected area once a day  pantoprazole 40 mg oral delayed release tablet: 1 tab(s) orally once a day (before a meal)  predniSONE 10 mg oral tablet: TAPER  9/30: Take 6 tabs  10/1: Take 5 tabs  10/2: Take 5 tabs  10/3: Take 4 tabs  10/4: Take 4 tabs  10/5: Take 3 tabs  10/6: Take 3 tabs  10/7: Take 2 tabs  10/8: Take 2 tabs  10/9: Take 1 tab  10/10: Take 1 tab  10/11: STOP  senna oral tablet: 2 tab(s) orally once a day (at bedtime)   bisacodyl 5 mg oral delayed release tablet: 1 tab(s) orally every 12 hours, As needed, Constipation  gabapentin 100 mg oral capsule: 1 cap(s) orally 3 times a day  lidocaine 5% topical film: Apply topically to affected area once a day  pantoprazole 40 mg oral delayed release tablet: 1 tab(s) orally once a day (before a meal)  predniSONE 10 mg oral tablet: TAPER  9/30: Take 6 tabs  10/1: Take 5 tabs  10/2: Take 5 tabs  10/3: Take 4 tabs  10/4: Take 4 tabs  10/5: Take 3 tabs  10/6: Take 3 tabs  10/7: Take 2 tabs  10/8: Take 2 tabs  10/9: Take 1 tab  10/10: Take 1 tab  10/11: STOP  senna oral tablet: 2 tab(s) orally once a day (at bedtime)  Synthroid 25 mcg (0.025 mg) oral tablet: 1 tab(s) orally once a day in the morning before breakfast   bisacodyl 5 mg oral delayed release tablet: 1 tab(s) orally every 12 hours, As needed, Constipation  gabapentin 100 mg oral capsule: 1 cap(s) orally 3 times a day  lidocaine 5% topical film: Apply topically to affected area once a day  outpatient physical therapy: Outpatient Physical therapy    Dx: Transverse Myelitis  pantoprazole 40 mg oral delayed release tablet: 1 tab(s) orally once a day (before a meal)  predniSONE 10 mg oral tablet: TAPER  9/30: Take 6 tabs  10/1: Take 5 tabs  10/2: Take 5 tabs  10/3: Take 4 tabs  10/4: Take 4 tabs  10/5: Take 3 tabs  10/6: Take 3 tabs  10/7: Take 2 tabs  10/8: Take 2 tabs  10/9: Take 1 tab  10/10: Take 1 tab  10/11: STOP  senna oral tablet: 2 tab(s) orally once a day (at bedtime)  Synthroid 25 mcg (0.025 mg) oral tablet: 1 tab(s) orally once a day in the morning before breakfast

## 2020-09-28 NOTE — DISCHARGE NOTE PROVIDER - PROVIDER TOKENS
PROVIDER:[TOKEN:[[id number]],FOLLOWUP:[Routine]],PROVIDER:[TOKEN:[[id number]],FOLLOWUP:[Routine]] PROVIDER:[TOKEN:[[id number]],FOLLOWUP:[Routine]],PROVIDER:[TOKEN:[[id number]],FOLLOWUP:[Routine]],PROVIDER:[TOKEN:[[id number]],FOLLOWUP:[Routine]]

## 2020-09-28 NOTE — DISCHARGE NOTE PROVIDER - CARE PROVIDERS DIRECT ADDRESSES
,[email or web address],DirectAddress_Unknown

## 2020-09-28 NOTE — DISCHARGE NOTE PROVIDER - CARE PROVIDER_API CALL
Jennifer Resendiz  NEUROLOGY  [address], [address] [place] [address]  Phone: [phone]  Fax: [phone]  Follow Up Time: Routine    Rai Grace  ENDOCRINOLOGY/METAB/DIABETES  [address] [place] [address]  Phone: [phone]  Fax: [phone]  Follow Up Time: Routine   Jennifer Resendiz  NEUROLOGY  [address], [address] [place] [address]  Phone: [phone]  Fax: [phone]  Follow Up Time: Routine    Rai Grace  ENDOCRINOLOGY/METAB/DIABETES  [address] [place] [address]  Phone: [phone]  Fax: [phone]  Follow Up Time: Routine    Judi Mcnally)  Neurology  [address] [place] [address]  Phone: [phone]  Fax: [phone]  Follow Up Time: Routine

## 2020-09-28 NOTE — PROGRESS NOTE ADULT - SUBJECTIVE AND OBJECTIVE BOX
SUBJECTIVE: patient seen and examined at bedside.     MEDICATIONS (HOME):  Home Medications:  Synthroid 25 mcg (0.025 mg) oral tablet: 1 tab(s) orally once a day (25 Sep 2020 06:07)    MEDICATIONS  (STANDING):  acetaminophen   Tablet .. 650 milliGRAM(s) Oral daily  dextrose 5%. 1000 milliLiter(s) (50 mL/Hr) IV Continuous <Continuous>  dextrose 50% Injectable 12.5 Gram(s) IV Push once  dextrose 50% Injectable 25 Gram(s) IV Push once  dextrose 50% Injectable 25 Gram(s) IV Push once  diphenhydrAMINE 25 milliGRAM(s) Oral daily  enoxaparin Injectable 40 milliGRAM(s) SubCutaneous daily  gabapentin 100 milliGRAM(s) Oral three times a day  immune   globulin 10% (GAMMAGARD) IVPB 40 Gram(s) IV Intermittent daily  insulin lispro (HumaLOG) corrective regimen sliding scale   SubCutaneous three times a day before meals  levothyroxine 25 MICROGram(s) Oral daily  lidocaine   Patch 1 Patch Transdermal daily  magnesium sulfate  IVPB 1 Gram(s) IV Intermittent once  pantoprazole    Tablet 40 milliGRAM(s) Oral before breakfast  senna 2 Tablet(s) Oral at bedtime  sodium chloride 0.9% Bolus 1000 milliLiter(s) IV Bolus once    MEDICATIONS  (PRN):  acetaminophen   Tablet .. 650 milliGRAM(s) Oral every 6 hours PRN Mild Pain (1 - 3)  bisacodyl 5 milliGRAM(s) Oral every 12 hours PRN Constipation  dextrose 40% Gel 15 Gram(s) Oral once PRN Blood Glucose LESS THAN 70 milliGRAM(s)/deciliter  glucagon  Injectable 1 milliGRAM(s) IntraMuscular once PRN Glucose LESS THAN 70 milligrams/deciliter  ibuprofen  Tablet. 600 milliGRAM(s) Oral every 8 hours PRN Moderate Pain (4 - 6)    ALLERGIES/INTOLERANCES:  Allergies  No Known Allergies    VITALS & EXAMINATION:  Vital Signs Last 24 Hrs  T(C): 36.7 (28 Sep 2020 05:20), Max: 36.8 (27 Sep 2020 15:57)  T(F): 98.1 (28 Sep 2020 05:20), Max: 98.2 (27 Sep 2020 15:57)  HR: 82 (28 Sep 2020 05:20) (72 - 104)  BP: 104/67 (28 Sep 2020 05:20) (102/69 - 112/67)  BP(mean): --  RR: 18 (28 Sep 2020 05:20) (18 - 18)  SpO2: 98% (28 Sep 2020 05:20) (96% - 99%)    Neurological (>12):  MS: Awake, alert, oriented to person, place, situation, time. Follows all commands.    Language: Speech is clear, fluent with good comprehension     CNs:  EOMI. No facial asymmetry b/l. Head turning intact b/l. Tongue midline.    Fundoscopic: deferred     Motor: Normal muscle bulk & tone. No noticeable tremor. No motor drift in the extremities.	     Sensation: Decreased to LT in the b/l LE.     Reflexes: deferred    Gait: Normal Romberg. No postural instability. Normal stance and gait. Normal stride length and arm swing.        LABORATORY:    STUDIES & IMAGING:  Studies (EKG, EEG, EMG, etc):     Radiology (XR, CT, MR, U/S, TTE/KEIRA):    MR Lumbar Spine w/wo IV Cont (09.25.20 @ 12:11)  IMPRESSION: Mild degenerative changes involving the cervical and lumbarspine without disc herniation, spinal stenosis or cord compression. Abnormal cord signal with patchy enhancement extending from C3-4 through T8-9. Differential diagnosis includes infectious,- inflammatory and demyelinating processes.    < from: MR Head w/wo IV Cont (09.25.20 @ 12:09) >  IMPRESSION: Patchy white matter changes which do not enhance and do not demonstrate diffusion restriction. No abnormal parenchymal or leptomeningeal enhancement.

## 2020-09-28 NOTE — PROGRESS NOTE ADULT - ASSESSMENT
41yo woman with pmhx of Myasthenia Gravis s/p thymectomy, Hypothyroidism who presented with 2 weeks of progressive symptoms of headache, fever, chills, culminating in bilateral LE numbness and band sensation around her waist of burning and severe chills.   Pt was admitted initially to the CDU where she had an MRI which showed abnormal cord signal from C3/4- T8/9 consistent with a longitudinally extensive myelitis. Pt admitted to the neurology service for further workup and management. CSF analysis significant for cell count of 26.  Possible infectious myelitis, vs inflammatory (NMO, neurosarcoid, etc) especially given her hx of MG and hypothyroidism.     Plan:  Medications:  [] Solumedrol 1G IV daily x3 days (9/25-/9/27)  [] IVIG 2 g/kg total divided over three days (9/26-9/28)  [] Protonix 40MG PO daily GI PPx  [] Gabapentin 100 mg PO TID  [] Senna 2 tablets QHS added for constipation with dulcolax 5 mg BID prn  Labs:  [] LP studies to follow up: CSF culture, CSF PCR, ACE, lyme, [place], IgG index, oligoclonal bands, NMO, MOG, Protein electrophoresis, VZV  [] serum studies: NMO, MOG, Vitamin b12, Thiamine, Repeat TSH, Vitamin E, Copper, Ceruloplasmin, A1C, lipid profile, ESR, CRP, UPEP, SPEP  [] Monitor FS while on steroids  [] c/w synthroid 25mcg daily in AM  Other:  [] PT/OT eval: no skilled needs  DVT ppx:  [] Lovenox DVT PPx  Disposition:  [] Tentative discharge for 9/28 after completion of IVIG    Case discussed with neurology attending, Dr. Ames   39yo woman with pmhx of Myasthenia Gravis s/p thymectomy, Hypothyroidism who presented with 2 weeks of progressive symptoms of headache, fever, chills, culminating in bilateral LE numbness and band sensation around her waist of burning and severe chills.   Pt was admitted initially to the CDU where she had an MRI which showed abnormal cord signal from C3/4- T8/9 consistent with a longitudinally extensive myelitis. Pt admitted to the neurology service for further workup and management. CSF analysis significant for cell count of 26.  Possible infectious myelitis, vs inflammatory (NMO, neurosarcoid, etc) especially given her hx of MG and hypothyroidism.     Plan:  Medications:  [] Solumedrol 1G IV daily x3 days (9/25-/9/27)- completed  [] IVIG 2 g/kg total divided over three days (9/26-9/28)- completed  [] Protonix 40MG PO daily GI PPx  [] Gabapentin 100 mg PO TID  [] Senna 2 tablets QHS added for constipation with dulcolax 5 mg BID prn  Labs:  [] LP studies to follow up: CSF culture, CSF PCR, ACE, lyme, [place], IgG index, oligoclonal bands, NMO, MOG, Protein electrophoresis, VZV  [] serum studies: NMO, MOG, Vitamin b12, Thiamine, Repeat TSH, Vitamin E, Copper, Ceruloplasmin, A1C, lipid profile, ESR, CRP, UPEP, SPEP  [] Monitor FS while on steroids  [] c/w synthroid 25mcg daily in AM  Other:  [] PT/OT eval: no skilled needs  DVT ppx:  [] Lovenox DVT PPx  Disposition:      Case discussed with neurology attending, Dr. Ames

## 2020-09-29 ENCOUNTER — TRANSCRIPTION ENCOUNTER (OUTPATIENT)
Age: 40
End: 2020-09-29

## 2020-09-29 VITALS
HEART RATE: 81 BPM | SYSTOLIC BLOOD PRESSURE: 104 MMHG | OXYGEN SATURATION: 97 % | RESPIRATION RATE: 18 BRPM | TEMPERATURE: 98 F | DIASTOLIC BLOOD PRESSURE: 72 MMHG

## 2020-09-29 LAB
% GAMMA, URINE: 26.4 % — SIGNIFICANT CHANGE UP
ALBUMIN 24H MFR UR ELPH: 10.5 % — SIGNIFICANT CHANGE UP
ALPHA1 GLOB 24H MFR UR ELPH: 32.3 % — SIGNIFICANT CHANGE UP
ALPHA2 GLOB 24H MFR UR ELPH: 16.4 % — SIGNIFICANT CHANGE UP
ANA PAT FLD IF-IMP: ABNORMAL
ANA PATTERN 2: ABNORMAL
ANA TITR SER: ABNORMAL
ANTI NUCLEAR FACTOR TITER 2: ABNORMAL
B-GLOBULIN 24H MFR UR ELPH: 14.4 % — SIGNIFICANT CHANGE UP
COLLECT DURATION TIME UR: 24 HR — SIGNIFICANT CHANGE UP
COPPER SERPL-MCNC: 107 UG/DL — SIGNIFICANT CHANGE UP (ref 72–166)
FOLATE SERPL-MCNC: 6.8 NG/ML — SIGNIFICANT CHANGE UP
GLUCOSE BLDC GLUCOMTR-MCNC: 114 MG/DL — HIGH (ref 70–99)
GLUCOSE BLDC GLUCOMTR-MCNC: 99 MG/DL — SIGNIFICANT CHANGE UP (ref 70–99)
INTERPRETATION 24H UR IFE-IMP: SIGNIFICANT CHANGE UP
INTERPRETATION 24H UR IFE-IMP: SIGNIFICANT CHANGE UP
M PROTEIN 24H UR ELPH-MRATE: 0 MG/24HR — SIGNIFICANT CHANGE UP (ref 0–0)
M PROTEIN 24H UR ELPH-MRATE: 0 MG/DL — SIGNIFICANT CHANGE UP
METHYLMALONATE SERPL-SCNC: 264 NMOL/L — SIGNIFICANT CHANGE UP (ref 0–378)
OLIGOCLONAL BANDS CSF ELPH-IMP: SIGNIFICANT CHANGE UP
PROT ?TM UR-MCNC: 5 MG/DL — SIGNIFICANT CHANGE UP (ref 0–12)
PROT PATTERN 24H UR ELPH-IMP: SIGNIFICANT CHANGE UP
PROTEIN QUANT CALC, URINE: 112 MG/24 H — HIGH (ref 50–100)
TOTAL VOLUME - 24 HOUR: 2250 ML — SIGNIFICANT CHANGE UP
URINE CREATININE CALCULATION: 0.7 G/24 H — LOW (ref 0.8–1.8)
VIT B12 SERPL-MCNC: 388 PG/ML — SIGNIFICANT CHANGE UP (ref 232–1245)
VIT D25+D1,25 OH+D1,25 PNL SERPL-MCNC: 33.3 PG/ML — SIGNIFICANT CHANGE UP (ref 19.9–79.3)

## 2020-09-29 PROCEDURE — 83090 ASSAY OF HOMOCYSTEINE: CPT

## 2020-09-29 PROCEDURE — U0003: CPT

## 2020-09-29 PROCEDURE — 85027 COMPLETE CBC AUTOMATED: CPT

## 2020-09-29 PROCEDURE — 84166 PROTEIN E-PHORESIS/URINE/CSF: CPT

## 2020-09-29 PROCEDURE — 84155 ASSAY OF PROTEIN SERUM: CPT

## 2020-09-29 PROCEDURE — 97166 OT EVAL MOD COMPLEX 45 MIN: CPT

## 2020-09-29 PROCEDURE — 94150 VITAL CAPACITY TEST: CPT

## 2020-09-29 PROCEDURE — 87086 URINE CULTURE/COLONY COUNT: CPT

## 2020-09-29 PROCEDURE — 93005 ELECTROCARDIOGRAM TRACING: CPT

## 2020-09-29 PROCEDURE — 82525 ASSAY OF COPPER: CPT

## 2020-09-29 PROCEDURE — 86431 RHEUMATOID FACTOR QUANT: CPT

## 2020-09-29 PROCEDURE — 86053 AQAPRN-4 ANTB FLO CYTMTRY EA: CPT

## 2020-09-29 PROCEDURE — 72157 MRI CHEST SPINE W/O & W/DYE: CPT

## 2020-09-29 PROCEDURE — 87483 CNS DNA AMP PROBE TYPE 12-25: CPT

## 2020-09-29 PROCEDURE — 82962 GLUCOSE BLOOD TEST: CPT

## 2020-09-29 PROCEDURE — 72158 MRI LUMBAR SPINE W/O & W/DYE: CPT

## 2020-09-29 PROCEDURE — G0378: CPT

## 2020-09-29 PROCEDURE — 83615 LACTATE (LD) (LDH) ENZYME: CPT

## 2020-09-29 PROCEDURE — 86592 SYPHILIS TEST NON-TREP QUAL: CPT

## 2020-09-29 PROCEDURE — 86140 C-REACTIVE PROTEIN: CPT

## 2020-09-29 PROCEDURE — 80053 COMPREHEN METABOLIC PANEL: CPT

## 2020-09-29 PROCEDURE — 97116 GAIT TRAINING THERAPY: CPT

## 2020-09-29 PROCEDURE — 82652 VIT D 1 25-DIHYDROXY: CPT

## 2020-09-29 PROCEDURE — 86617 LYME DISEASE ANTIBODY: CPT

## 2020-09-29 PROCEDURE — 86225 DNA ANTIBODY NATIVE: CPT

## 2020-09-29 PROCEDURE — 89051 BODY FLUID CELL COUNT: CPT

## 2020-09-29 PROCEDURE — 70553 MRI BRAIN STEM W/O & W/DYE: CPT

## 2020-09-29 PROCEDURE — 84443 ASSAY THYROID STIM HORMONE: CPT

## 2020-09-29 PROCEDURE — 83921 ORGANIC ACID SINGLE QUANT: CPT

## 2020-09-29 PROCEDURE — 99285 EMERGENCY DEPT VISIT HI MDM: CPT | Mod: 25

## 2020-09-29 PROCEDURE — 84702 CHORIONIC GONADOTROPIN TEST: CPT

## 2020-09-29 PROCEDURE — 82164 ANGIOTENSIN I ENZYME TEST: CPT

## 2020-09-29 PROCEDURE — 74018 RADEX ABDOMEN 1 VIEW: CPT

## 2020-09-29 PROCEDURE — 86255 FLUORESCENT ANTIBODY SCREEN: CPT

## 2020-09-29 PROCEDURE — 97530 THERAPEUTIC ACTIVITIES: CPT

## 2020-09-29 PROCEDURE — 86788 WEST NILE VIRUS AB IGM: CPT

## 2020-09-29 PROCEDURE — A9585: CPT

## 2020-09-29 PROCEDURE — 82945 GLUCOSE OTHER FLUID: CPT

## 2020-09-29 PROCEDURE — 82390 ASSAY OF CERULOPLASMIN: CPT

## 2020-09-29 PROCEDURE — 99239 HOSP IP/OBS DSCHRG MGMT >30: CPT | Mod: GC

## 2020-09-29 PROCEDURE — 80061 LIPID PANEL: CPT

## 2020-09-29 PROCEDURE — 86769 SARS-COV-2 COVID-19 ANTIBODY: CPT

## 2020-09-29 PROCEDURE — 82607 VITAMIN B-12: CPT

## 2020-09-29 PROCEDURE — 87205 SMEAR GRAM STAIN: CPT

## 2020-09-29 PROCEDURE — 87070 CULTURE OTHR SPECIMN AEROBIC: CPT

## 2020-09-29 PROCEDURE — 87799 DETECT AGENT NOS DNA QUANT: CPT

## 2020-09-29 PROCEDURE — 84446 ASSAY OF VITAMIN E: CPT

## 2020-09-29 PROCEDURE — 85652 RBC SED RATE AUTOMATED: CPT

## 2020-09-29 PROCEDURE — 82746 ASSAY OF FOLIC ACID SERUM: CPT

## 2020-09-29 PROCEDURE — 97161 PT EVAL LOW COMPLEX 20 MIN: CPT

## 2020-09-29 PROCEDURE — 86334 IMMUNOFIX E-PHORESIS SERUM: CPT

## 2020-09-29 PROCEDURE — 83036 HEMOGLOBIN GLYCOSYLATED A1C: CPT

## 2020-09-29 PROCEDURE — 86789 WEST NILE VIRUS ANTIBODY: CPT

## 2020-09-29 PROCEDURE — 86362 MOG-IGG1 ANTB CBA EACH: CPT

## 2020-09-29 PROCEDURE — 85025 COMPLETE CBC W/AUTO DIFF WBC: CPT

## 2020-09-29 PROCEDURE — 72156 MRI NECK SPINE W/O & W/DYE: CPT

## 2020-09-29 PROCEDURE — 81001 URINALYSIS AUTO W/SCOPE: CPT

## 2020-09-29 PROCEDURE — 86038 ANTINUCLEAR ANTIBODIES: CPT

## 2020-09-29 PROCEDURE — 84165 PROTEIN E-PHORESIS SERUM: CPT

## 2020-09-29 PROCEDURE — 84157 ASSAY OF PROTEIN OTHER: CPT

## 2020-09-29 PROCEDURE — 81025 URINE PREGNANCY TEST: CPT

## 2020-09-29 RX ORDER — LEVOTHYROXINE SODIUM 125 MCG
1 TABLET ORAL
Qty: 30 | Refills: 0
Start: 2020-09-29 | End: 2020-10-28

## 2020-09-29 RX ORDER — GABAPENTIN 400 MG/1
1 CAPSULE ORAL
Qty: 90 | Refills: 3
Start: 2020-09-29 | End: 2021-01-26

## 2020-09-29 RX ORDER — LIDOCAINE 4 G/100G
1 CREAM TOPICAL
Qty: 30 | Refills: 3
Start: 2020-09-29 | End: 2021-01-26

## 2020-09-29 RX ORDER — MULTIVIT WITH MIN/MFOLATE/K2 340-15/3 G
1 POWDER (GRAM) ORAL ONCE
Refills: 0 | Status: COMPLETED | OUTPATIENT
Start: 2020-09-29 | End: 2020-09-29

## 2020-09-29 RX ORDER — SENNA PLUS 8.6 MG/1
2 TABLET ORAL
Qty: 60 | Refills: 3
Start: 2020-09-29 | End: 2021-01-26

## 2020-09-29 RX ORDER — PANTOPRAZOLE SODIUM 20 MG/1
1 TABLET, DELAYED RELEASE ORAL
Qty: 30 | Refills: 0
Start: 2020-09-29 | End: 2020-10-28

## 2020-09-29 RX ADMIN — Medication 5 MILLIGRAM(S): at 05:14

## 2020-09-29 RX ADMIN — Medication 60 MILLIGRAM(S): at 10:12

## 2020-09-29 RX ADMIN — GABAPENTIN 100 MILLIGRAM(S): 400 CAPSULE ORAL at 05:14

## 2020-09-29 RX ADMIN — LIDOCAINE 1 PATCH: 4 CREAM TOPICAL at 05:18

## 2020-09-29 RX ADMIN — Medication 25 MICROGRAM(S): at 05:13

## 2020-09-29 RX ADMIN — Medication 10 MILLIGRAM(S): at 08:12

## 2020-09-29 NOTE — PROGRESS NOTE ADULT - SUBJECTIVE AND OBJECTIVE BOX
SUBJECTIVE: patient seen and examined at bedside. bowel movements    MEDICATIONS (HOME):  Home Medications:    MEDICATIONS  (STANDING):  acetaminophen   Tablet .. 650 milliGRAM(s) Oral daily  dextrose 5%. 1000 milliLiter(s) (50 mL/Hr) IV Continuous <Continuous>  dextrose 50% Injectable 12.5 Gram(s) IV Push once  dextrose 50% Injectable 25 Gram(s) IV Push once  dextrose 50% Injectable 25 Gram(s) IV Push once  diphenhydrAMINE 25 milliGRAM(s) Oral daily  enoxaparin Injectable 40 milliGRAM(s) SubCutaneous daily  gabapentin 100 milliGRAM(s) Oral three times a day  immune   globulin 10% (GAMMAGARD) IVPB 40 Gram(s) IV Intermittent daily  insulin lispro (HumaLOG) corrective regimen sliding scale   SubCutaneous three times a day before meals  levothyroxine 25 MICROGram(s) Oral daily  lidocaine   Patch 1 Patch Transdermal daily  magnesium sulfate  IVPB 1 Gram(s) IV Intermittent once  pantoprazole    Tablet 40 milliGRAM(s) Oral before breakfast  predniSONE   Tablet 60 milliGRAM(s) Oral once  senna 2 Tablet(s) Oral at bedtime  simethicone 80 milliGRAM(s) Chew once  sodium chloride 0.9% Bolus 1000 milliLiter(s) IV Bolus once    MEDICATIONS  (PRN):  acetaminophen   Tablet .. 650 milliGRAM(s) Oral every 6 hours PRN Mild Pain (1 - 3)  bisacodyl 5 milliGRAM(s) Oral every 12 hours PRN Constipation  dextrose 40% Gel 15 Gram(s) Oral once PRN Blood Glucose LESS THAN 70 milliGRAM(s)/deciliter  glucagon  Injectable 1 milliGRAM(s) IntraMuscular once PRN Glucose LESS THAN 70 milligrams/deciliter  ibuprofen  Tablet. 600 milliGRAM(s) Oral every 8 hours PRN Moderate Pain (4 - 6)  simethicone 80 milliGRAM(s) Chew two times a day PRN Gas    ALLERGIES/INTOLERANCES:  Allergies  No Known Allergies    VITALS & EXAMINATION:  Vital Signs Last 24 Hrs  T(C): 36.7 (29 Sep 2020 05:09), Max: 36.9 (28 Sep 2020 11:33)  T(F): 98.1 (29 Sep 2020 05:09), Max: 98.4 (28 Sep 2020 11:33)  HR: 80 (29 Sep 2020 05:09) (67 - 85)  BP: 107/73 (29 Sep 2020 05:09) (107/73 - 133/78)  BP(mean): --  RR: 19 (29 Sep 2020 05:09) (18 - 19)  SpO2: 96% (29 Sep 2020 05:09) (96% - 99%)    Neurological (>12):    MS: Awake, alert, oriented to person, place, situation, time. Follows all commands.    Language: Speech is clear, fluent with good comprehension     CNs:  EOMI. No facial asymmetry b/l. Head turning intact b/l. Tongue midline.    Fundoscopic: deferred     Motor: Normal muscle bulk & tone. No noticeable tremor. No motor drift in the extremities.	     Sensation: Decreased to LT in the b/l LE.     Reflexes: deferred    Gait: Normal Romberg. No postural instability. Normal stance and gait. Normal stride length and arm swing.      LABORATORY:  CBC                       13.6   16.33 )-----------( 237      ( 28 Sep 2020 08:19 )             41.2     Chem 09-28    137  |  104  |  19  ----------------------------<  115<H>  4.3   |  25  |  0.53    Ca    8.9      28 Sep 2020 08:19    TPro  8.7<H>  /  Alb  4.1  /  TBili  0.6  /  DBili  x   /  AST  12  /  ALT  12  /  AlkPhos  34<L>  09-28    LFTs LIVER FUNCTIONS - ( 28 Sep 2020 08:19 )  Alb: 4.1 g/dL / Pro: 8.7 g/dL / ALK PHOS: 34 U/L / ALT: 12 U/L / AST: 12 U/L / GGT: x           Coagulopathy   Lipid Panel 09-25 Chol 236<H> <H> HDL 56 Trig 80    STUDIES & IMAGING:  Studies (EKG, EEG, EMG, etc):     Radiology (XR, CT, MR, U/S, TTE/KEIRA):    MR Cervical, Thoracic, Lumbar Spine w/wo IV Cont (09.25.20 @ 12:11)  IMPRESSION: Mild degenerative changes involving the cervical and lumbarspine without disc herniation, spinal stenosis or cord compression. Abnormal cord signal with patchy enhancement extending from C3-4 through T8-9. Differential diagnosis includes infectious,- inflammatory and demyelinating processes.   SUBJECTIVE: patient seen and examined at bedside. She had a bowel movement this morning . However she has a headache now and is resting and using a compress on her scalp with some relief.  She has no new neurologic symptoms but her numbness in her waist down to her feet is about the same she reports.     MEDICATIONS (HOME):  Home Medications:    MEDICATIONS  (STANDING):  acetaminophen   Tablet .. 650 milliGRAM(s) Oral daily  dextrose 5%. 1000 milliLiter(s) (50 mL/Hr) IV Continuous <Continuous>  dextrose 50% Injectable 12.5 Gram(s) IV Push once  dextrose 50% Injectable 25 Gram(s) IV Push once  dextrose 50% Injectable 25 Gram(s) IV Push once  diphenhydrAMINE 25 milliGRAM(s) Oral daily  enoxaparin Injectable 40 milliGRAM(s) SubCutaneous daily  gabapentin 100 milliGRAM(s) Oral three times a day  immune   globulin 10% (GAMMAGARD) IVPB 40 Gram(s) IV Intermittent daily  insulin lispro (HumaLOG) corrective regimen sliding scale   SubCutaneous three times a day before meals  levothyroxine 25 MICROGram(s) Oral daily  lidocaine   Patch 1 Patch Transdermal daily  magnesium sulfate  IVPB 1 Gram(s) IV Intermittent once  pantoprazole    Tablet 40 milliGRAM(s) Oral before breakfast  predniSONE   Tablet 60 milliGRAM(s) Oral once  senna 2 Tablet(s) Oral at bedtime  simethicone 80 milliGRAM(s) Chew once  sodium chloride 0.9% Bolus 1000 milliLiter(s) IV Bolus once    MEDICATIONS  (PRN):  acetaminophen   Tablet .. 650 milliGRAM(s) Oral every 6 hours PRN Mild Pain (1 - 3)  bisacodyl 5 milliGRAM(s) Oral every 12 hours PRN Constipation  dextrose 40% Gel 15 Gram(s) Oral once PRN Blood Glucose LESS THAN 70 milliGRAM(s)/deciliter  glucagon  Injectable 1 milliGRAM(s) IntraMuscular once PRN Glucose LESS THAN 70 milligrams/deciliter  ibuprofen  Tablet. 600 milliGRAM(s) Oral every 8 hours PRN Moderate Pain (4 - 6)  simethicone 80 milliGRAM(s) Chew two times a day PRN Gas    ALLERGIES/INTOLERANCES:  Allergies  No Known Allergies    VITALS & EXAMINATION:  Vital Signs Last 24 Hrs  T(C): 36.7 (29 Sep 2020 05:09), Max: 36.9 (28 Sep 2020 11:33)  T(F): 98.1 (29 Sep 2020 05:09), Max: 98.4 (28 Sep 2020 11:33)  HR: 80 (29 Sep 2020 05:09) (67 - 85)  BP: 107/73 (29 Sep 2020 05:09) (107/73 - 133/78)  BP(mean): --  RR: 19 (29 Sep 2020 05:09) (18 - 19)  SpO2: 96% (29 Sep 2020 05:09) (96% - 99%)    Neurological (>12):    MS: Awake, alert, oriented to person, place, situation, time. Follows all commands.    Language: Speech is clear, fluent with good comprehension     CNs:  EOMI. No facial asymmetry b/l. Head turning intact b/l. Tongue midline.    Fundoscopic: deferred     Motor: Normal muscle bulk & tone. No noticeable tremor. No motor drift in the extremities.	     Sensation: Decreased to LT in the b/l LE.     Reflexes: deferred    Gait: Unable to test as patient preferred to rest and lay flat     LABORATORY:  CBC                       13.6   16.33 )-----------( 237      ( 28 Sep 2020 08:19 )             41.2     Chem 09-28    137  |  104  |  19  ----------------------------<  115<H>  4.3   |  25  |  0.53    Ca    8.9      28 Sep 2020 08:19    TPro  8.7<H>  /  Alb  4.1  /  TBili  0.6  /  DBili  x   /  AST  12  /  ALT  12  /  AlkPhos  34<L>  09-28    LFTs LIVER FUNCTIONS - ( 28 Sep 2020 08:19 )  Alb: 4.1 g/dL / Pro: 8.7 g/dL / ALK PHOS: 34 U/L / ALT: 12 U/L / AST: 12 U/L / GGT: x           Coagulopathy   Lipid Panel 09-25 Chol 236<H> <H> HDL 56 Trig 80    STUDIES & IMAGING:  Studies (EKG, EEG, EMG, etc):     Radiology (XR, CT, MR, U/S, TTE/KEIRA):    MR Cervical, Thoracic, Lumbar Spine w/wo IV Cont (09.25.20 @ 12:11)  IMPRESSION: Mild degenerative changes involving the cervical and lumbar spine without disc herniation, spinal stenosis or cord compression. Abnormal cord signal with patchy enhancement extending from C3-4 through T8-9. Differential diagnosis includes infectious,- inflammatory and demyelinating processes.

## 2020-09-29 NOTE — DISCHARGE NOTE NURSING/CASE MANAGEMENT/SOCIAL WORK - NSDCFUADDAPPT_GEN_ALL_CORE_FT
[facility] rehabilitation for Physical therapy - you were given outpatient physical therapy script.  [address] [facility] [id number], [place] [address]  [phone]

## 2020-09-29 NOTE — PROGRESS NOTE ADULT - ASSESSMENT
39yo woman with pmhx of Myasthenia Gravis s/p thymectomy, Hypothyroidism who presented with 2 weeks of progressive symptoms of headache, fever, chills, culminating in bilateral LE numbness and band sensation around her waist of burning and severe chills.   Pt was admitted initially to the CDU where she had an MRI which showed abnormal cord signal from C3/4- T8/9 consistent with a longitudinally extensive myelitis. Pt admitted to the neurology service for further workup and management. CSF analysis significant for cell count of 26.  Possible infectious myelitis, vs inflammatory (NMO, neurosarcoid, etc) especially given her hx of MG and hypothyroidism.     Plan:  Medications:  [] Solumedrol 1G IV daily x3 days (9/25-/9/27)- completed  [] IVIG 2 g/kg total divided over three days (9/26-9/28)- completed  [] steroid taper  [] Protonix 40MG PO daily GI PPx  [] Gabapentin 100 mg PO TID  [] Senna 2 tablets QHS added for constipation with dulcolax 5 mg BID prn  Labs:  [] LP studies to follow up: CSF culture, CSF PCR, ACE, lyme, [place], IgG index, oligoclonal bands, NMO, MOG, Protein electrophoresis, VZV  [] serum studies: NMO, MOG, Vitamin b12, Thiamine, Repeat TSH, Vitamin E, Copper, Ceruloplasmin, A1C, lipid profile, ESR, CRP, UPEP, SPEP  [] Monitor FS while on steroids  [] c/w synthroid 25mcg daily in AM  Other:  [] PT/OT eval: no skilled needs  [] SW/CM: offered services per patient request  DVT ppx:  [] Lovenox DVT PPx    Disposition: home      Case to be discussed with Neurology Attending Dr. Pemberton 39yo woman with pmhx of Myasthenia Gravis s/p thymectomy, Hypothyroidism who presented with 2 weeks of progressive symptoms of headache, fever, chills, culminating in bilateral LE numbness and band sensation around her waist of burning and severe chills.   Pt was admitted initially to the CDU where she had an MRI which showed abnormal cord signal from C3/4- T8/9 consistent with a longitudinally extensive myelitis. Pt admitted to the neurology service for further workup and management. CSF analysis significant for cell count of 26.  Possible infectious myelitis, vs inflammatory (NMO, neurosarcoid, etc) especially given her hx of MG and hypothyroidism.     Plan:  Medications:  [] Solumedrol 1G IV daily x3 days (9/25-/9/27)- completed  [] IVIG 2 g/kg total divided over three days (9/26-9/28)- completed  [] steroid taper  [] Protonix 40MG PO daily GI PPx  [] Gabapentin 100 mg PO TID  [] Senna 2 tablets QHS added for constipation with dulcolax 5 mg BID prn  Labs:  [] LP studies to follow up: CSF culture, CSF PCR, ACE, lyme, [place], IgG index, oligoclonal bands, NMO, MOG, Protein electrophoresis, VZV  [] serum studies: NMO, MOG, Vitamin b12, Thiamine, Repeat TSH, Vitamin E, Copper, Ceruloplasmin, A1C, lipid profile, ESR, CRP, UPEP, SPEP  [] Monitor FS while on steroids  [] c/w synthroid 25mcg daily in AM  Other:  [] PT/OT eval: no skilled needs  [] SW/CM: offered services per patient request  DVT ppx:  [] Lovenox DVT PPx    Disposition: home      Case discussed and seen  with Neurology Attending Dr. Pemberton

## 2020-09-29 NOTE — PROGRESS NOTE ADULT - ATTENDING COMMENTS
Pt reports that she feels very anxious about going home and is worried about taking care of herself since she still feels impaired due to numbness. WIll speak to social work regarding any devices or services for home. Plan for discharge tomorrow.
Pt with stable symptoms. Discussed expectations for recovery and plan for follow up.
Patient seen and examined with neurology team and above note reviewed in detail and I agree with assessment and plan as outlined. Patient hx as noted and found to have a longitudinal extensive lesion of the spinal cord and completed IV steroid course and IVIG and has responded well however with continued numbness from waist to feet.  Exam as detailed above and I agree with findings.    Awaiting remainder of CSF and serum autoimmune, inflammatory and demyelinating results    Patient may be discharged this afternoon with outpatient follow up with withcorrine Resendiz or Dr Judi Mcnally at [address] [place] [address] in [place].    Headache mgt , conservative     Complete oral steroid taper as outlined.    Continue stool regimen and all questions answered and patient understood plan.
Pt today feels that discomfort and chills has improved. Still having numbness of the feet. No weakness, urinary or bowel issues. She is walking independently. Will start IVIG given the extent of her myelitis.

## 2020-09-29 NOTE — DISCHARGE NOTE NURSING/CASE MANAGEMENT/SOCIAL WORK - PATIENT PORTAL LINK FT
You can access the FollowMyHealth Patient Portal offered by [facility] by registering at the following website: [email or web address]. By joining [facility]’s FollowMyHealth portal, you will also be able to view your health information using other applications (apps) compatible with our system.

## 2020-09-30 LAB
A-TOCOPHEROL VIT E SERPL-MCNC: 12.2 MG/L — SIGNIFICANT CHANGE UP (ref 7–25.1)
BETA+GAMMA TOCOPHEROL SERPL-MCNC: 1.8 MG/L — SIGNIFICANT CHANGE UP (ref 0.5–5.5)

## 2020-10-01 LAB — VZV PCR: SIGNIFICANT CHANGE UP COPIES/ML

## 2020-10-03 LAB
AQP4 H2O CHANNEL IGG CSF QL: POSITIVE — HIGH
VDRL CSF-TITR: NEGATIVE — SIGNIFICANT CHANGE UP

## 2020-10-06 LAB
HOMOCYSTEINE LEVEL: 7.8 UMOL/L — SIGNIFICANT CHANGE UP
METHYLMALONIC ACID LEVEL: 175 NMOL/L — SIGNIFICANT CHANGE UP (ref 87–318)

## 2020-10-07 LAB
MOG AB CSF QL CBA IFA: NEGATIVE — SIGNIFICANT CHANGE UP
MOG AB SER QL CBA IFA: NEGATIVE — SIGNIFICANT CHANGE UP

## 2020-10-08 LAB
B BURGDOR AB CSF-ACNC: SIGNIFICANT CHANGE UP
WNV IGG CSF IA-ACNC: NEGATIVE — SIGNIFICANT CHANGE UP
WNV IGM CSF IA-ACNC: NEGATIVE — SIGNIFICANT CHANGE UP

## 2020-10-16 LAB — PARANEOPLASTIC AB PNL SER: SIGNIFICANT CHANGE UP

## 2020-10-23 ENCOUNTER — APPOINTMENT (OUTPATIENT)
Dept: NEUROLOGY | Facility: CLINIC | Age: 40
End: 2020-10-23
Payer: COMMERCIAL

## 2020-10-23 VITALS
HEART RATE: 82 BPM | SYSTOLIC BLOOD PRESSURE: 101 MMHG | DIASTOLIC BLOOD PRESSURE: 70 MMHG | TEMPERATURE: 98.2 F | BODY MASS INDEX: 27.23 KG/M2 | WEIGHT: 148 LBS | OXYGEN SATURATION: 100 % | HEIGHT: 62 IN

## 2020-10-23 DIAGNOSIS — Z79.899 OTHER LONG TERM (CURRENT) DRUG THERAPY: ICD-10-CM

## 2020-10-23 PROCEDURE — 99215 OFFICE O/P EST HI 40 MIN: CPT

## 2020-10-23 PROCEDURE — 99354: CPT

## 2020-10-23 PROCEDURE — 99072 ADDL SUPL MATRL&STAF TM PHE: CPT

## 2020-10-23 RX ORDER — MIRABEGRON 50 MG/1
50 TABLET, FILM COATED, EXTENDED RELEASE ORAL
Qty: 90 | Refills: 2 | Status: DISCONTINUED | COMMUNITY
Start: 2018-11-06 | End: 2020-10-23

## 2020-10-23 RX ORDER — AZITHROMYCIN 500 MG/1
500 TABLET, FILM COATED ORAL DAILY
Qty: 5 | Refills: 0 | Status: DISCONTINUED | COMMUNITY
Start: 2019-10-15 | End: 2020-10-23

## 2020-10-23 RX ORDER — CLOTRIMAZOLE AND BETAMETHASONE DIPROPIONATE 10; .5 MG/G; MG/G
1-0.05 CREAM TOPICAL
Qty: 1 | Refills: 0 | Status: DISCONTINUED | COMMUNITY
Start: 2018-09-27 | End: 2020-10-23

## 2020-10-23 RX ORDER — FLUCONAZOLE 150 MG/1
150 TABLET ORAL
Qty: 1 | Refills: 0 | Status: DISCONTINUED | COMMUNITY
Start: 2018-10-10 | End: 2020-10-23

## 2020-10-23 NOTE — HISTORY OF PRESENT ILLNESS
[FreeTextEntry1] : Reason for consult: NMO\par \par HPI: BALDO CAMILO is a 40 year old woman \par \par hx of myasthenia gravis - dx'd in 2005 with drooping of the L eye, weakness in both arms and legs, trouble swallowing. started imuran, prednisone, and mestinon X8mon, then thymectomy and has no longer needed meds. occ SOB residual.\par 9/2020 - developed a HA. several days later, developed abnormal sensations on the torso, burning pain, then RLE and LLE became numb, then torso became numb. Went to ED, sent home with pain medication. Then saw Dr. Pompa, ordered spine and brain MRI but wasn't able to get it before she had worsened burning sensation. Went to ED, had MRIs which showed myelitis. Got IVMP, then short prednisone taper. Burning subsided. \par Since then, has had loss of sensation from the torso down. \par Some "disturbing" electrical sensation provoked by movement. \par Stress worsens sx.\par Started PT yesterday. \par \par ROS/Current Sx:\par 10 point ROS reviewed and scanned\par Since then, has had loss of sensation from the torso down. \par Some "disturbing" electrical sensation provoked by movement. taking gabapentin which doesn't help.\par Stress worsens sx.\par no BB dysfunction\par + gait dysfunction\par + constipation 2/2 numbness\par + SOB\par \par PMHX:\par myasthenia gravis\par myelitis\par hypothyroid\par \par MEDS:\par synthroid\par gabapentin 300 daily\par \par ALL: nkda\par \par SHx: 1x/wk tob, no etoh, no drugs. 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 \par \par FHx: no other AI, uncle with MS. \par \par Vitals: unremarkable\par \par Exam:\par \par AO3.  Normally conversant.  Follows commands, names, and repeats.  Good attention.\par \par PERRL, VFF, EOMI, no nystagmus, face symmetric, TUP at midline.\par \par Motor: some functional overlay but the below are estimates.\par                                                 R:                               L:\par Del                                           5-                                5-\par Bi                                              5-                               5-\par Tri                                            5-                               5-\par Wrist Extensors                      5-                               5-\par Finger abductors                    5-                               5-\par                                         5                               5 \par \par HF                                           4+                               4+\par KE                                           5                               5\par KF                                           4+                              4+\par DF                                           5-                               4\par PF                                           5                               5\par \par Tone                                       R                               L\par UE                                          0                                0 \par LE                                          0                                0\par \par Sensory                                RUE                      LUE                 RLE                LLE     \par LT                                           +                            +                    dec                   dec\par Vib                                          +                            +                      mod                   sev\par JPS                                         +                            +                      dec                   dec\par PP                                         +                            +                      dec                  dec\par Temp                                     +                            +                      dec                   dec\par \par Reflexes:\par                                              R                             L                            \par Biceps                                 1                            1\par BR                                       1                            1\par Triceps                              \par Pat                                       1                          1\par AJ                                        1                            1\par \par TOES                                    F                            F\par \par Coordination:\par                                              R                             L                       \par FTN                                       0                             0 \par PATITO                                      0                            0\par HTS                                      0                             0 \par \par Other                                                                          \par  \par Gait: wide based, cautious. \par \par                     Assistance: none\par \par 10/2020\par ESR\par b12 wnl\par TSh high but T4 wnl\par vitD 16\par HATTIE 1:320\par AQP4+\par ssa/ssb neg\par HTLV neg\par JCV+\par \par 9/2020\par copper wnl\par ACE wnl\par dsdna neg\par RF wnl\par HATTIE 1:640\par \par CSF 9/2020\par glucose nl\par prot 44\par absent OCBs\par igg index/synth wnl\par WNV neg\par WBC 26\par VDRL neg\par lyme neg\par AQP4+\par \par MRI brain 9/2020 - unrevealing\par \par MRI C+T 9/2020 - longtidunally extensive myelitis\par \par \par AP: NMO (LETM in 9/2020, apq4 + in both serum and CSF), hx of MG as well, +HATTIE.\par \par Answered all questions, education provided re: dx, prognosis, management. management discussed at length. Personally reviewed imaging and blood work with pt. Discussed risks/benefits of uplinza and soliris, including infusion related reactions and infection risk. Helped pt fill out paperwork for medication.\par \par - start uplinza\par - lab work prior to starting medication\par - PT\par - prednisone 20mg daily until steroid sparing agent.\par - inc gabapentin to 300tid slowly, \par - would be a candidate for NENMOC\par - RTC 2m\par \par \par \par

## 2020-10-27 LAB
HBV CORE IGG+IGM SER QL: NONREACTIVE
HBV CORE IGM SER QL: NONREACTIVE
HBV SURFACE AG SER QL: NONREACTIVE

## 2020-10-28 ENCOUNTER — APPOINTMENT (OUTPATIENT)
Dept: NEUROLOGY | Facility: CLINIC | Age: 40
End: 2020-10-28
Payer: COMMERCIAL

## 2020-10-28 LAB
M TB IFN-G BLD-IMP: NEGATIVE
QUANTIFERON TB PLUS MITOGEN MINUS NIL: 3.79 IU/ML
QUANTIFERON TB PLUS NIL: 0.02 IU/ML
QUANTIFERON TB PLUS TB1 MINUS NIL: 0 IU/ML
QUANTIFERON TB PLUS TB2 MINUS NIL: 0 IU/ML

## 2020-10-28 PROCEDURE — 99214 OFFICE O/P EST MOD 30 MIN: CPT | Mod: 95

## 2020-10-29 NOTE — HISTORY OF PRESENT ILLNESS
[FreeTextEntry1] : Initial hx 10/2020\par hx of myasthenia gravis - dx'd in 2005 with drooping of the L eye, weakness in both arms and legs, trouble swallowing. started imuran, prednisone, and mestinon X8mon, then thymectomy and has no longer needed meds. occ SOB residual.\par 9/2020 - developed a HA. several days later, developed abnormal sensations on the torso, burning pain, then RLE and LLE became numb, then torso became numb. Went to ED, sent home with pain medication. Then saw Dr. Pompa, ordered spine and brain MRI but wasn't able to get it before she had worsened burning sensation. Went to ED, had MRIs which showed myelitis. Got IVMP, then short prednisone taper. Burning subsided. \par Since then, has had loss of sensation from the torso down. \par Some "disturbing" electrical sensation provoked by movement. \par Stress worsens sx.\par Started PT yesterday. \par \par Subj interval:\par \par Episodes, several per day, provoked by movement, of paresthesia to L torso to the left leg, not painful. No spread. No lasting new sx outside of episode.\par \par ROS/Current Sx:\par 10 point ROS reviewed and scanned\par Since then, has had loss of sensation from the torso down. \par Some "disturbing" electrical sensation provoked by movement. 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 \par \par \par O:\par \par AO3. Normally conversant.\par moves all 4 well\par \par \par 10/2020\par ESR\par b12 wnl\par TSh high but T4 wnl\par vitD 16\par HATTIE 1:320\par AQP4+\par ssa/ssb neg\par HTLV neg\par JCV+\par \par 9/2020\par copper wnl\par ACE wnl\par dsdna neg\par RF wnl\par HATTIE 1:640\par \par CSF 9/2020\par glucose nl\par prot 44\par absent OCBs\par igg index/synth wnl\par WNV neg\par WBC 26\par VDRL neg\par lyme neg\par AQP4+\par \par MRI brain 9/2020 - unrevealing\par \par MRI C+T 9/2020 - longitudinally extensive myelitis\par \par \par AP: NMO (LETM in 9/2020, apq4 + in both serum and CSF), hx of MG as well, +HATTIE.\par \par Episodes of sensory disturbance likely related to cord injury from recent myelitis, but are not on their own indicative of new inflammatory activity.\par \par Answered all questions, education provided re: dx, prognosis, management. management discussed at length.\par \par - start uplinza\par - PT\par - prednisone 20mg daily until steroid sparing agent.\par - inc gabapentin to 300tid slowly\par - pt agreed to be contacted re: NENMOC**\par - RTC 1m after starting uplinza\par

## 2020-11-19 ENCOUNTER — NON-APPOINTMENT (OUTPATIENT)
Age: 40
End: 2020-11-19

## 2020-11-20 ENCOUNTER — APPOINTMENT (OUTPATIENT)
Dept: NEUROLOGY | Facility: CLINIC | Age: 40
End: 2020-11-20
Payer: COMMERCIAL

## 2020-11-20 VITALS — DIASTOLIC BLOOD PRESSURE: 65 MMHG | HEART RATE: 76 BPM | RESPIRATION RATE: 16 BRPM | SYSTOLIC BLOOD PRESSURE: 103 MMHG

## 2020-11-20 LAB
HCG UR QL: NEGATIVE
QUALITY CONTROL: YES

## 2020-11-20 PROCEDURE — 81025 URINE PREGNANCY TEST: CPT

## 2020-11-20 PROCEDURE — 96413 CHEMO IV INFUSION 1 HR: CPT

## 2020-11-20 PROCEDURE — 96415 CHEMO IV INFUSION ADDL HR: CPT

## 2020-11-20 PROCEDURE — S0028: CPT

## 2020-11-20 RX ORDER — FAMOTIDINE 10 MG/ML
20 INJECTION, SOLUTION INTRAVENOUS
Refills: 0 | Status: COMPLETED | OUTPATIENT
Start: 2020-11-20

## 2020-11-20 RX ORDER — METHYLPREDNISOLONE SODIUM SUCCINATE 125 MG/2ML
125 INJECTION, POWDER, FOR SOLUTION INTRAMUSCULAR; INTRAVENOUS
Qty: 0 | Refills: 0 | Status: COMPLETED | OUTPATIENT
Start: 2020-11-20

## 2020-11-20 RX ORDER — INEBILIZUMAB 10 MG/ML
100 INJECTION INTRAVENOUS
Refills: 0 | Status: COMPLETED | OUTPATIENT
Start: 2020-11-20

## 2020-11-20 RX ADMIN — Medication 0 MG/2ML: at 00:00

## 2020-11-20 RX ADMIN — INEBILIZUMAB 0 MG/10ML: 10 INJECTION INTRAVENOUS at 00:00

## 2020-11-20 RX ADMIN — METHYLPREDNISOLONE SODIUM SUCCINATE 0 MG: 125 INJECTION, POWDER, FOR SOLUTION INTRAMUSCULAR; INTRAVENOUS at 00:00

## 2020-11-22 ENCOUNTER — NON-APPOINTMENT (OUTPATIENT)
Age: 40
End: 2020-11-22

## 2020-11-25 ENCOUNTER — NON-APPOINTMENT (OUTPATIENT)
Age: 40
End: 2020-11-25

## 2020-12-03 ENCOUNTER — APPOINTMENT (OUTPATIENT)
Dept: NEUROLOGY | Facility: CLINIC | Age: 40
End: 2020-12-03
Payer: COMMERCIAL

## 2020-12-03 PROCEDURE — 99072 ADDL SUPL MATRL&STAF TM PHE: CPT

## 2020-12-03 PROCEDURE — 96415 CHEMO IV INFUSION ADDL HR: CPT

## 2020-12-03 PROCEDURE — 96413 CHEMO IV INFUSION 1 HR: CPT

## 2020-12-03 PROCEDURE — S0028: CPT

## 2020-12-04 ENCOUNTER — APPOINTMENT (OUTPATIENT)
Dept: NEUROLOGY | Facility: CLINIC | Age: 40
End: 2020-12-04

## 2020-12-09 ENCOUNTER — APPOINTMENT (OUTPATIENT)
Dept: OBGYN | Facility: CLINIC | Age: 40
End: 2020-12-09
Payer: COMMERCIAL

## 2020-12-09 ENCOUNTER — OUTPATIENT (OUTPATIENT)
Dept: OUTPATIENT SERVICES | Facility: HOSPITAL | Age: 40
LOS: 1 days | End: 2020-12-09
Payer: COMMERCIAL

## 2020-12-09 ENCOUNTER — LABORATORY RESULT (OUTPATIENT)
Age: 40
End: 2020-12-09

## 2020-12-09 ENCOUNTER — RESULT CHARGE (OUTPATIENT)
Age: 40
End: 2020-12-09

## 2020-12-09 VITALS — BODY MASS INDEX: 27.44 KG/M2 | SYSTOLIC BLOOD PRESSURE: 116 MMHG | DIASTOLIC BLOOD PRESSURE: 70 MMHG | WEIGHT: 150 LBS

## 2020-12-09 VITALS — TEMPERATURE: 97.8 F

## 2020-12-09 DIAGNOSIS — Z90.49 ACQUIRED ABSENCE OF OTHER SPECIFIED PARTS OF DIGESTIVE TRACT: Chronic | ICD-10-CM

## 2020-12-09 DIAGNOSIS — G70.00 MYASTHENIA GRAVIS WITHOUT (ACUTE) EXACERBATION: Chronic | ICD-10-CM

## 2020-12-09 DIAGNOSIS — Z01.419 ENCOUNTER FOR GYNECOLOGICAL EXAMINATION (GENERAL) (ROUTINE) WITHOUT ABNORMAL FINDINGS: ICD-10-CM

## 2020-12-09 DIAGNOSIS — N76.0 ACUTE VAGINITIS: ICD-10-CM

## 2020-12-09 LAB
BILIRUB UR QL STRIP: NORMAL
CLARITY UR: CLEAR
COLLECTION METHOD: NORMAL
GLUCOSE UR-MCNC: NORMAL
HCG UR QL: 0.2 EU/DL
HGB UR QL STRIP.AUTO: NORMAL
KETONES UR-MCNC: NORMAL
LEUKOCYTE ESTERASE UR QL STRIP: NORMAL
NITRITE UR QL STRIP: NORMAL
PH UR STRIP: 7
PROT UR STRIP-MCNC: NORMAL
SP GR UR STRIP: 1.02

## 2020-12-09 PROCEDURE — 87624 HPV HI-RISK TYP POOLED RSLT: CPT

## 2020-12-09 PROCEDURE — 87800 DETECT AGNT MULT DNA DIREC: CPT

## 2020-12-09 PROCEDURE — 87086 URINE CULTURE/COLONY COUNT: CPT

## 2020-12-09 PROCEDURE — 88175 CYTOPATH C/V AUTO FLUID REDO: CPT

## 2020-12-09 PROCEDURE — 88141 CYTOPATH C/V INTERPRET: CPT

## 2020-12-09 PROCEDURE — 99386 PREV VISIT NEW AGE 40-64: CPT | Mod: NC

## 2020-12-09 PROCEDURE — XXXXX: CPT

## 2020-12-09 PROCEDURE — G0463: CPT

## 2020-12-09 PROCEDURE — 87625 HPV TYPES 16 & 18 ONLY: CPT

## 2020-12-09 PROCEDURE — 87186 SC STD MICRODIL/AGAR DIL: CPT

## 2020-12-10 ENCOUNTER — LABORATORY RESULT (OUTPATIENT)
Age: 40
End: 2020-12-10

## 2020-12-10 LAB
CANDIDA AB TITR SER: DETECTED
G VAGINALIS DNA SPEC QL NAA+PROBE: SIGNIFICANT CHANGE UP
HPV HIGH+LOW RISK DNA PNL CVX: DETECTED
T VAGINALIS SPEC QL WET PREP: SIGNIFICANT CHANGE UP

## 2020-12-10 NOTE — PHYSICAL EXAM
[Appropriately responsive] : appropriately responsive [Alert] : alert [No Acute Distress] : no acute distress [Soft] : soft [Non-tender] : non-tender [Non-distended] : non-distended [No Lesions] : no lesions [No Mass] : no mass [Oriented x3] : oriented x3 [Examination Of The Breasts] : a normal appearance [No Discharge] : no discharge [No Masses] : no breast masses were palpable [Labia Majora] : normal [Labia Minora] : normal [No Bleeding] : There was no active vaginal bleeding [Normal] : normal [Normal Position] : in a normal position [Uterine Adnexae] : normal [FreeTextEntry5] : nonlabored breathing [FreeTextEntry8] : nontender fundus, nontender adnexa b/l

## 2020-12-12 LAB
-  AMIKACIN: SIGNIFICANT CHANGE UP
-  AMOXICILLIN/CLAVULANIC ACID: SIGNIFICANT CHANGE UP
-  AMPICILLIN/SULBACTAM: SIGNIFICANT CHANGE UP
-  AMPICILLIN: SIGNIFICANT CHANGE UP
-  AZTREONAM: SIGNIFICANT CHANGE UP
-  CEFAZOLIN: SIGNIFICANT CHANGE UP
-  CEFEPIME: SIGNIFICANT CHANGE UP
-  CEFOXITIN: SIGNIFICANT CHANGE UP
-  CEFTRIAXONE: SIGNIFICANT CHANGE UP
-  CIPROFLOXACIN: SIGNIFICANT CHANGE UP
-  ERTAPENEM: SIGNIFICANT CHANGE UP
-  GENTAMICIN: SIGNIFICANT CHANGE UP
-  IMIPENEM: SIGNIFICANT CHANGE UP
-  LEVOFLOXACIN: SIGNIFICANT CHANGE UP
-  MEROPENEM: SIGNIFICANT CHANGE UP
-  NITROFURANTOIN: SIGNIFICANT CHANGE UP
-  PIPERACILLIN/TAZOBACTAM: SIGNIFICANT CHANGE UP
-  TIGECYCLINE: SIGNIFICANT CHANGE UP
-  TOBRAMYCIN: SIGNIFICANT CHANGE UP
-  TRIMETHOPRIM/SULFAMETHOXAZOLE: SIGNIFICANT CHANGE UP
CULTURE RESULTS: SIGNIFICANT CHANGE UP
HPV GENOTYPE 16: SIGNIFICANT CHANGE UP
HPV GENOTYPE 18/45: DETECTED
METHOD TYPE: SIGNIFICANT CHANGE UP
ORGANISM # SPEC MICROSCOPIC CNT: SIGNIFICANT CHANGE UP
ORGANISM # SPEC MICROSCOPIC CNT: SIGNIFICANT CHANGE UP
SPECIMEN SOURCE: SIGNIFICANT CHANGE UP

## 2020-12-14 ENCOUNTER — NON-APPOINTMENT (OUTPATIENT)
Age: 40
End: 2020-12-14

## 2020-12-14 LAB — CYTOLOGY SPEC DOC CYTO: SIGNIFICANT CHANGE UP

## 2020-12-30 ENCOUNTER — APPOINTMENT (OUTPATIENT)
Dept: OBGYN | Facility: HOSPITAL | Age: 40
End: 2020-12-30

## 2021-01-06 ENCOUNTER — RESULT REVIEW (OUTPATIENT)
Age: 41
End: 2021-01-06

## 2021-01-06 ENCOUNTER — APPOINTMENT (OUTPATIENT)
Dept: MAMMOGRAPHY | Facility: IMAGING CENTER | Age: 41
End: 2021-01-06
Payer: COMMERCIAL

## 2021-01-06 ENCOUNTER — OUTPATIENT (OUTPATIENT)
Dept: OUTPATIENT SERVICES | Facility: HOSPITAL | Age: 41
LOS: 1 days | End: 2021-01-06
Payer: COMMERCIAL

## 2021-01-06 DIAGNOSIS — Z90.49 ACQUIRED ABSENCE OF OTHER SPECIFIED PARTS OF DIGESTIVE TRACT: Chronic | ICD-10-CM

## 2021-01-06 DIAGNOSIS — Z00.8 ENCOUNTER FOR OTHER GENERAL EXAMINATION: ICD-10-CM

## 2021-01-06 DIAGNOSIS — G70.00 MYASTHENIA GRAVIS WITHOUT (ACUTE) EXACERBATION: Chronic | ICD-10-CM

## 2021-01-06 PROCEDURE — 77067 SCR MAMMO BI INCL CAD: CPT

## 2021-01-06 PROCEDURE — 77063 BREAST TOMOSYNTHESIS BI: CPT | Mod: 26

## 2021-01-06 PROCEDURE — 77063 BREAST TOMOSYNTHESIS BI: CPT

## 2021-01-06 PROCEDURE — 77067 SCR MAMMO BI INCL CAD: CPT | Mod: 26

## 2021-01-07 ENCOUNTER — RESULT CHARGE (OUTPATIENT)
Age: 41
End: 2021-01-07

## 2021-01-07 ENCOUNTER — APPOINTMENT (OUTPATIENT)
Dept: OBGYN | Facility: CLINIC | Age: 41
End: 2021-01-07
Payer: COMMERCIAL

## 2021-01-07 ENCOUNTER — OUTPATIENT (OUTPATIENT)
Dept: OUTPATIENT SERVICES | Facility: HOSPITAL | Age: 41
LOS: 1 days | End: 2021-01-07
Payer: COMMERCIAL

## 2021-01-07 VITALS — DIASTOLIC BLOOD PRESSURE: 70 MMHG | WEIGHT: 152 LBS | SYSTOLIC BLOOD PRESSURE: 120 MMHG | BODY MASS INDEX: 27.8 KG/M2

## 2021-01-07 VITALS — TEMPERATURE: 97.8 F

## 2021-01-07 DIAGNOSIS — N76.0 ACUTE VAGINITIS: ICD-10-CM

## 2021-01-07 DIAGNOSIS — Z90.49 ACQUIRED ABSENCE OF OTHER SPECIFIED PARTS OF DIGESTIVE TRACT: Chronic | ICD-10-CM

## 2021-01-07 DIAGNOSIS — G70.00 MYASTHENIA GRAVIS WITHOUT (ACUTE) EXACERBATION: Chronic | ICD-10-CM

## 2021-01-07 DIAGNOSIS — N39.0 URINARY TRACT INFECTION, SITE NOT SPECIFIED: ICD-10-CM

## 2021-01-07 DIAGNOSIS — B37.9 CANDIDIASIS, UNSPECIFIED: ICD-10-CM

## 2021-01-07 LAB
BILIRUB UR QL STRIP: NORMAL
GLUCOSE UR-MCNC: 1000
HCG UR QL: 0.2 EU/DL
HGB UR QL STRIP.AUTO: NORMAL
KETONES UR-MCNC: NORMAL
LEUKOCYTE ESTERASE UR QL STRIP: NORMAL
NITRITE UR QL STRIP: NORMAL
PH UR STRIP: 6
PROT UR STRIP-MCNC: NORMAL
SP GR UR STRIP: 1.02

## 2021-01-07 PROCEDURE — 99213 OFFICE O/P EST LOW 20 MIN: CPT | Mod: NC

## 2021-01-07 PROCEDURE — G0463: CPT

## 2021-01-07 PROCEDURE — 87086 URINE CULTURE/COLONY COUNT: CPT

## 2021-01-07 PROCEDURE — 81002 URINALYSIS NONAUTO W/O SCOPE: CPT

## 2021-01-07 NOTE — HISTORY OF PRESENT ILLNESS
[FreeTextEntry1] : 41yo  (LMP 12/15/20) presents complaining of urinary frequency and discomfort again. +urinary odor.   Pt seen here  for annual-  tx for UTI and Yeast infection.  (Ucx >100k ecoli).  Pt only took Cipro- never took diflucan. \par Denies fever/chills/back pain.

## 2021-01-07 NOTE — DISCUSSION/SUMMARY
[FreeTextEntry1] : 39yo P2-- UTI sx, untreated yeast\par - UA w/ tr blood, leuks +glucose\par - presumed recurrent UTI- Macrobid rx [ ] Ucx sent\par - terazol rx \par \par Mario Palacios,PAC

## 2021-01-07 NOTE — PHYSICAL EXAM
[FreeTextEntry1] : +erythematous, white scaling noted  [FreeTextEntry4] : +copious white thick d/c consistent with yeast.

## 2021-01-08 ENCOUNTER — LABORATORY RESULT (OUTPATIENT)
Age: 41
End: 2021-01-08

## 2021-01-10 ENCOUNTER — FORM ENCOUNTER (OUTPATIENT)
Age: 41
End: 2021-01-10

## 2021-01-10 LAB
CULTURE RESULTS: SIGNIFICANT CHANGE UP
SPECIMEN SOURCE: SIGNIFICANT CHANGE UP

## 2021-01-11 ENCOUNTER — TRANSCRIPTION ENCOUNTER (OUTPATIENT)
Age: 41
End: 2021-01-11

## 2021-01-13 ENCOUNTER — NON-APPOINTMENT (OUTPATIENT)
Age: 41
End: 2021-01-13

## 2021-01-14 ENCOUNTER — OUTPATIENT (OUTPATIENT)
Dept: OUTPATIENT SERVICES | Facility: HOSPITAL | Age: 41
LOS: 1 days | End: 2021-01-14
Payer: COMMERCIAL

## 2021-01-14 ENCOUNTER — APPOINTMENT (OUTPATIENT)
Dept: DISASTER EMERGENCY | Facility: HOSPITAL | Age: 41
End: 2021-01-14

## 2021-01-14 VITALS
DIASTOLIC BLOOD PRESSURE: 73 MMHG | RESPIRATION RATE: 18 BRPM | SYSTOLIC BLOOD PRESSURE: 106 MMHG | HEART RATE: 92 BPM | TEMPERATURE: 98 F | OXYGEN SATURATION: 97 %

## 2021-01-14 VITALS
RESPIRATION RATE: 18 BRPM | TEMPERATURE: 98 F | DIASTOLIC BLOOD PRESSURE: 67 MMHG | HEART RATE: 94 BPM | SYSTOLIC BLOOD PRESSURE: 101 MMHG | OXYGEN SATURATION: 98 %

## 2021-01-14 DIAGNOSIS — U07.1 COVID-19: ICD-10-CM

## 2021-01-14 DIAGNOSIS — Z90.49 ACQUIRED ABSENCE OF OTHER SPECIFIED PARTS OF DIGESTIVE TRACT: Chronic | ICD-10-CM

## 2021-01-14 DIAGNOSIS — G70.00 MYASTHENIA GRAVIS WITHOUT (ACUTE) EXACERBATION: Chronic | ICD-10-CM

## 2021-01-14 PROCEDURE — M0243: CPT

## 2021-01-14 NOTE — CHART NOTE - NSCHARTNOTEFT_GEN_A_CORE
CC: Monoclonal Antibody Infusion/COVID 19 Positive  40yFemale with PMHx neuromyelitis optica and symptoms of cough, malaise     exam/findings:  T(C): 36.7 (01-14-21 @ 13:55), Max: 36.7 (01-14-21 @ 13:55)  HR: 92 (01-14-21 @ 13:55) (92 - 92)  BP: 106/73 (01-14-21 @ 13:55) (106/73 - 106/73)  RR: 18 (01-14-21 @ 13:55) (18 - 18)  SpO2: 97% (01-14-21 @ 13:55) (97% - 97%)      PE:   Appearance: NAD	  HEENT:   Normal oral mucosa,   Lymphatic: No lymphadenopathy  Cardiovascular: Normal S1 S2, No JVD, No murmurs, No edema  Respiratory: Lungs clear to auscultation	  Gastrointestinal:  Soft, Non-tender, + BS	  Skin: warm and dry  Neurologic: Non-focal  Extremities: Normal range of motion, no calf tenderness or edema    ASSESSMENT:  Pt is a 40y with PMHx of neuromyelitis optica, Covid 19 Positive with symptoms in the last 10 days, who presents to infusion center for Monoclonal antibody infusion (Casirivimab/Imdevimab)  Symptoms/ Criteria: cough, malaise, fever  Risk Profile includes:  immunosuppressive therapy     PLAN:  - infusion procedure explained to patient   -Consent for monoclonal antibody infusion obtained   - Risk & benifits discussed/all questions answered  -infuse  Casirivimab/Imdevimab 1200mg/1200mg IV over one hour   -observe patient for one hour post infusion      I have reviewed the Casirivimab/Imdevimab Emergency Use Authorization (EAU) and I have provided the patient or patient's caregiver with the following information:  1. FDA has authorized emergency use of Casirivimab/Imdevimab, which is not FDA-approved biologic product.  2. The patient or patient's caregiver has the option to accept or refuse administration of Casirivimab/Imdevimab  3. The significant known and benefits are unknown.  4. Information on available alternative treatments and risks and benefits of those alternatives.    Discharge:  Patient tolerated infusion well denies complaints of chest pain/SOB/dizziness/ palps  Vital signs stable  D/C instructions given/ fact sheet included.  Patient to follow-up with PCP as needed.

## 2021-01-15 ENCOUNTER — TRANSCRIPTION ENCOUNTER (OUTPATIENT)
Age: 41
End: 2021-01-15

## 2021-01-21 ENCOUNTER — TRANSCRIPTION ENCOUNTER (OUTPATIENT)
Age: 41
End: 2021-01-21

## 2021-01-25 ENCOUNTER — APPOINTMENT (OUTPATIENT)
Dept: NEUROLOGY | Facility: CLINIC | Age: 41
End: 2021-01-25
Payer: COMMERCIAL

## 2021-01-25 VITALS
DIASTOLIC BLOOD PRESSURE: 80 MMHG | HEART RATE: 90 BPM | WEIGHT: 154 LBS | BODY MASS INDEX: 28.34 KG/M2 | SYSTOLIC BLOOD PRESSURE: 127 MMHG | HEIGHT: 62 IN

## 2021-01-25 PROCEDURE — 99072 ADDL SUPL MATRL&STAF TM PHE: CPT

## 2021-01-25 PROCEDURE — 99215 OFFICE O/P EST HI 40 MIN: CPT

## 2021-01-25 NOTE — HISTORY OF PRESENT ILLNESS
[FreeTextEntry1] : Initial hx 10/2020\par hx of myasthenia gravis - dx'd in 2005 with drooping of the L eye, weakness in both arms and legs, trouble swallowing. started imuran, prednisone, and mestinon X8mon, then thymectomy and has no longer needed meds. occ SOB residual.\par 9/2020 - developed a HA. several days later, developed abnormal sensations on the torso, burning pain, then RLE and LLE became numb, then torso became numb. Went to ED, sent home with pain medication. Then saw Dr. Pompa, ordered spine and brain MRI but wasn't able to get it before she had worsened burning sensation. Went to ED, had MRIs which showed myelitis. Got IVMP, then short prednisone taper. Burning subsided. \par Since then, has had loss of sensation from the torso down. \par Some "disturbing" electrical sensation provoked by movement. \par Stress worsens sx.\par Started PT yesterday. \par Started uplizna 12/2020\par 1/13/21 - COVID sx started friday. dx'd yesterday after covid pcr positive. generalized weakness, pressure in nose. no fevers, +cough, no SOB. + disturbance of smell and taste but may be due to congestion. will likely get antibody cocktail tomorrow. \par \par \par Subj interval:\par \par Started uplizna 12/2020, uneventful.\par \par Still on prednisone 20mg.\par \par Neuropathic pain has eased a bit, tolerable, but "disturbing" paresthesias.\par \par ROS/Current Sx:\par Since then, has had loss of sensation from the torso down. \par Some "disturbing" electrical sensation provoked by movement. 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 \par \par O:\par \par AO3. Normally conversant. Follows commands, names, and repeats. Good attention.\par \par PERRL, VFF, EOMI, no nystagmus, face symmetric, TUP at midline.\par \par Motor: some functional overlay but the below are estimates.\par    R:  L:\par Del   5  5\par Bi   5  5\par Tri   5 5\par Wrist Extensors  5  5\par Finger abductors  5  5\par    5  5 \par \par HF   5 5\par KE   5  5\par KF   5 5\par DF   5 5\par PF   5  5\par \par Tone   R  L\par UE   0  0 \par LE   0  0\par \par Sensory  RUE  LUE  RLE LLE \par LT   +  +  dec  dec\par Vib   +  +  mod  sev\par JPS   +  +  dec  dec\par PP   +  +  dec  dec\par Temp   +  +  dec  dec\par \par Reflexes:\par    R  L  \par Biceps   1  1\par BR   1  1\par Triceps  \par Pat   1  1\par AJ   1  1\par \par TOES   F  F\par \par Coordination:\par    R  L  \par FTN   0  0 \par PATITO   0  0\par HTS   0  0 \par \par Other     \par  \par Gait: narrow based, can tandem, heel, and toe walk\par \par   Assistance: none\par \par 10/2020\par ESR\par b12 wnl\par TSh high but T4 wnl\par vitD 16\par HATTIE 1:320\par AQP4+\par ssa/ssb neg\par HTLV neg\par JCV+\par \par 9/2020\par copper wnl\par ACE wnl\par dsdna neg\par RF wnl\par HATTIE 1:640\par \par CSF 9/2020\par glucose nl\par prot 44\par absent OCBs\par igg index/synth wnl\par WNV neg\par WBC 26\par VDRL neg\par lyme neg\par AQP4+\par \par MRI brain 9/2020 - unrevealing\par \par MRI C+T 9/2020 - longitudinally extensive myelitis\par \par \par AP: NMO (LETM in 9/2020, apq4 + in both serum and CSF), hx of MG as well, +HATTIE.\par \par Episodes of sensory disturbance likely related to cord injury from recent myelitis, but are not on their own indicative of new inflammatory activity.\par \par Answered all questions, education provided re: dx, prognosis, management. management discussed at length.\par \par - cont uplizna\par - PT\par - prednisone 15 daily x2wks, 10mg x2wks, then 7.5 x1wk, 5mg x1wk, 2.5mg x1wk.\par - cont gabapentin 300tid. pt reports it is tolerable at this time\par - MRI C+T \par - vitD 4ku daily for vitD def\par - pt agreed to be contacted re: NENMOC*\par - RTC 3m

## 2021-02-10 NOTE — ED PROVIDER NOTE - PMH
Anxiety    Hypothyroid    Myasthenia gravis     Pain Refusal Text: I offered to prescribe pain medication but the patient refused to take this medication.

## 2021-02-26 ENCOUNTER — APPOINTMENT (OUTPATIENT)
Dept: UROGYNECOLOGY | Facility: CLINIC | Age: 41
End: 2021-02-26

## 2021-03-29 ENCOUNTER — APPOINTMENT (OUTPATIENT)
Dept: NEUROLOGY | Facility: CLINIC | Age: 41
End: 2021-03-29
Payer: COMMERCIAL

## 2021-03-29 VITALS
HEIGHT: 62 IN | DIASTOLIC BLOOD PRESSURE: 87 MMHG | HEART RATE: 91 BPM | SYSTOLIC BLOOD PRESSURE: 121 MMHG | WEIGHT: 164 LBS | BODY MASS INDEX: 30.18 KG/M2

## 2021-03-29 VITALS — TEMPERATURE: 97.3 F

## 2021-03-29 PROCEDURE — 99417 PROLNG OP E/M EACH 15 MIN: CPT

## 2021-03-29 PROCEDURE — 99072 ADDL SUPL MATRL&STAF TM PHE: CPT

## 2021-03-29 PROCEDURE — 99215 OFFICE O/P EST HI 40 MIN: CPT

## 2021-03-29 NOTE — HISTORY OF PRESENT ILLNESS
[FreeTextEntry1] : Initial hx 10/2020\par hx of myasthenia gravis - dx'd in 2005 with drooping of the L eye, weakness in both arms and legs, trouble swallowing. started imuran, prednisone, and mestinon X8mon, then thymectomy and has no longer needed meds. occ SOB residual.\par 9/2020 - developed a HA. several days later, developed abnormal sensations on the torso, burning pain, then RLE and LLE became numb, then torso became numb. Went to ED, sent home with pain medication. Then saw Dr. Pompa, ordered spine and brain MRI but wasn't able to get it before she had worsened burning sensation. Went to ED, had MRIs which showed myelitis. Got IVMP, then short prednisone taper. Burning subsided. \par Since then, has had loss of sensation from the torso down. \par Some "disturbing" electrical sensation provoked by movement. \par Stress worsens sx.\par Started PT yesterday. \par Started uplizna 12/2020\par 1/13/21 - COVID sx started friday. dx'd yesterday after covid pcr positive. generalized weakness, pressure in nose. no fevers, +cough, no SOB. + disturbance of smell and taste but may be due to congestion. will likely get antibody cocktail tomorrow. \par tapered off prednisone in mid 3/2021.\par \par \par Subj interval:\par \par Tapered off steroids.\par \par For past 3wks, when looking right and left, sees "smoke" in her vision. Some eye pain. Involving both eyes. Some conjunctival injection. Appetite has been poor. Diarrhea last week.\par \par Started uplizna 12/2020, uneventful.\par \par No more burning pain. Stopped gabapentin.\par \par \par ROS/Current Sx:\par - loss of sensation from the torso down. 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 \par \par O:\par \par AO3. Normally conversant. Follows commands, names, and repeats. Good attention.\par \par PERRL, normal disc margins, VFF, EOMI, no nystagmus, face symmetric, TUP at midline.\par \par Motor: some functional overlay but the below are estimates.\par  R: L:\par Del 5 5\par Bi 5 5\par Tri 5 5\par Wrist Extensors 5 5\par Finger abductors 5 5\par  5 5 \par \par HF 5 5\par KE 5 5\par KF 5 5\par DF 5 5\par PF 5 5\par \par Tone R L\par UE 0 0 \par LE 0 0\par \par Sensory RUE LUE RLE LLE \par LT + + dec dec\par Vib + + mod sev\par JPS + + dec dec\par PP + + dec dec\par Temp + + dec dec\par \par Reflexes:\par  R L \par Biceps 1 1\par BR 1 1\par Triceps \par Pat 1 1\par AJ 1 1\par \par TOES F F\par \par Coordination:\par  R L \par FTN 0 0 \par PATITO 0 0\par HTS 0 0 \par \par Other \par  \par Gait: narrow based, can tandem, heel, and toe walk\par \par  Assistance: none\par \par 10/2020\par ESR\par b12 wnl\par TSh high but T4 wnl\par vitD 16\par HATTIE 1:320\par AQP4+\par ssa/ssb neg\par HTLV neg\par JCV+\par \par 9/2020\par copper wnl\par ACE wnl\par dsdna neg\par RF wnl\par HATTIE 1:640\par \par CSF 9/2020\par glucose nl\par prot 44\par absent OCBs\par igg index/synth wnl\par WNV neg\par WBC 26\par VDRL neg\par lyme neg\par AQP4+\par \par MRI brain 9/2020 - unrevealing\par \par MRI C+T 9/2020 - longitudinally extensive myelitis\par \par \par AP: NMO (LETM in 9/2020, apq4 + in both serum and CSF), hx of MG as well, +HATTIE.\par \par Bothersome sensory symptoms have improved. New visual disturbance - likely floaters- likely not related to NMO, but of unclear etiology.\par \par Answered all questions, education provided re: dx, prognosis, management, pregnancy planning. management discussed at length.\par \par - cont uplizna\par - check MRI orbits given new optic sx.\par - referral to neuro-ophtho\par - MRI C+T as planned previously\par - vitD 4ku daily for vitD def\par - pt agreed to be contacted re: NENMOC*\par - counseled on planned pregnancy - advised contraception when within 3m of uplizna.\par - RTC 3m \par

## 2021-03-31 ENCOUNTER — APPOINTMENT (OUTPATIENT)
Dept: OPHTHALMOLOGY | Facility: CLINIC | Age: 41
End: 2021-03-31
Payer: COMMERCIAL

## 2021-03-31 ENCOUNTER — NON-APPOINTMENT (OUTPATIENT)
Age: 41
End: 2021-03-31

## 2021-03-31 PROCEDURE — 92014 COMPRE OPH EXAM EST PT 1/>: CPT

## 2021-03-31 PROCEDURE — 92015 DETERMINE REFRACTIVE STATE: CPT

## 2021-03-31 PROCEDURE — 92083 EXTENDED VISUAL FIELD XM: CPT

## 2021-03-31 PROCEDURE — 99072 ADDL SUPL MATRL&STAF TM PHE: CPT

## 2021-04-05 ENCOUNTER — APPOINTMENT (OUTPATIENT)
Dept: NEUROLOGY | Facility: CLINIC | Age: 41
End: 2021-04-05
Payer: COMMERCIAL

## 2021-04-05 VITALS
HEART RATE: 84 BPM | WEIGHT: 164 LBS | BODY MASS INDEX: 30.18 KG/M2 | SYSTOLIC BLOOD PRESSURE: 118 MMHG | HEIGHT: 62 IN | DIASTOLIC BLOOD PRESSURE: 84 MMHG

## 2021-04-05 PROCEDURE — 99072 ADDL SUPL MATRL&STAF TM PHE: CPT

## 2021-04-05 PROCEDURE — 99215 OFFICE O/P EST HI 40 MIN: CPT

## 2021-04-05 NOTE — HISTORY OF PRESENT ILLNESS
[FreeTextEntry1] : Initial hx 10/2020\par hx of myasthenia gravis - dx'd in 2005 with drooping of the L eye, weakness in both arms and legs, trouble swallowing. started imuran, prednisone, and mestinon X8mon, then thymectomy and has no longer needed meds. occ SOB residual.\par 9/2020 - developed a HA. several days later, developed abnormal sensations on the torso, burning pain, then RLE and LLE became numb, then torso became numb. Went to ED, sent home with pain medication. Then saw Dr. Pompa, ordered spine and brain MRI but wasn't able to get it before she had worsened burning sensation. Went to ED, had MRIs which showed myelitis. Got IVMP, then short prednisone taper. Burning subsided. \par Since then, has had loss of sensation from the torso down. \par Some "disturbing" electrical sensation provoked by movement. \par Stress worsens sx.\par Started PT yesterday. \par Started uplizna 12/2020\par 1/13/21 - COVID sx started friday. dx'd yesterday after covid pcr positive. generalized weakness, pressure in nose. no fevers, +cough, no SOB. + disturbance of smell and taste but may be due to congestion. will likely get antibody cocktail tomorrow. \par tapered off prednisone in mid 3/2021.\par 3/2021 - For past 3wks, when looking right and left, sees "smoke" in her vision. Some eye pain. Involving both eyes. Some conjunctival injection. Appetite has been poor. Diarrhea last week.\par 4/2021 - Over past 2d, sharp pain in the mid back behind ribs, burning on L torso. Today, mid-back is gone but burning of L torso persists. this is intermittent lasting seconds at a time. No sx in the legs., no BB dysfunction.\par \par Subj interval:\par \par Over past 2d, sharp pain in the mid back behind ribs, burning on L torso. Today, mid-back is gone but burning of L torso persists. this is intermittent lasting seconds at a time. No sx in the legs., no BB dysfunction. Similar sx to what she used to have during her initial myelitis.\par \par Uplizna since 12/2020, uneventful.\par \par ROS/Current Sx:\par - loss of sensation from the torso down. 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 \par \par O:\par \par AO3. Normally conversant. Follows commands, names, and repeats. Good attention.\par \par PERRL, normal disc margins, VFF, EOMI, no nystagmus, face symmetric, TUP at midline.\par \par Motor: some functional overlay but the below are estimates.\par  R: L:\par Del 5 5\par Bi 5 5\par Tri 5 5\par Wrist Extensors 5 5\par Finger abductors 5 5\par  5 5 \par \par HF 5 5\par KE 5 5\par KF 5 5\par DF 5 5\par PF 5 5\par \par Tone R L\par UE 0 0 \par LE 0 0\par \par Sensory RUE LUE RLE LLE \par LT + + dec dec\par Vib + + mod sev\par JPS + + dec dec\par PP + + dec dec\par Temp + + dec dec\par \par Reflexes:\par  R L \par Biceps 1 1\par BR 1 1\par Triceps \par Pat 1 1\par AJ 1 1\par \par TOES F F\par \par Coordination:\par  R L \par FTN 0 0 \par PATITO 0 0\par HTS 0 0 \par \par Other \par  \par Gait: narrow based, can tandem, heel, and toe walk\par \par  Assistance: none\par \par 10/2020\par ESR\par b12 wnl\par TSh high but T4 wnl\par vitD 16\par HATTIE 1:320\par AQP4+\par ssa/ssb neg\par HTLV neg\par JCV+\par \par 9/2020\par copper wnl\par ACE wnl\par dsdna neg\par RF wnl\par HATTIE 1:640\par \par CSF 9/2020\par glucose nl\par prot 44\par absent OCBs\par igg index/synth wnl\par WNV neg\par WBC 26\par VDRL neg\par lyme neg\par AQP4+\par \par MRI brain 9/2020 - unrevealing\par \par MRI C+T 9/2020 - longitudinally extensive myelitis\par \par \par AP: NMO (LETM in 9/2020, apq4 + in both serum and CSF), hx of MG as well, +HATTIE.\par \par Severe pain over past several days. Similar to what she has had before during 1st myelitis. Exam without changes though limited given prior numbness. I believe that ultimately she has exacerbation of sx 2/2 spinal cord injury as opposed to new NMO related inflammation.\par \par Answered all questions, education provided re: dx, prognosis, management, pregnancy planning. management discussed at length.\par \par - cont uplizna\par - MRI C+T as planned previously and MRI orbits as well.\par - vitD 4ku daily for vitD def\par - pt agreed to be contacted re: NENMOC*\par - counseled on planned pregnancy - advised contraception when within 3m of uplizna.\par - trial of topical lidocaine. If doesn't help enough, restart gabapentin 300tid\par - RTC 3m

## 2021-04-07 ENCOUNTER — APPOINTMENT (OUTPATIENT)
Dept: MRI IMAGING | Facility: IMAGING CENTER | Age: 41
End: 2021-04-07

## 2021-04-07 ENCOUNTER — INPATIENT (INPATIENT)
Facility: HOSPITAL | Age: 41
LOS: 1 days | Discharge: HOME | DRG: 60 | End: 2021-04-09
Attending: PSYCHIATRY & NEUROLOGY | Admitting: PSYCHIATRY & NEUROLOGY
Payer: COMMERCIAL

## 2021-04-07 VITALS
DIASTOLIC BLOOD PRESSURE: 82 MMHG | SYSTOLIC BLOOD PRESSURE: 151 MMHG | OXYGEN SATURATION: 97 % | WEIGHT: 145.06 LBS | HEART RATE: 85 BPM | TEMPERATURE: 98 F | HEIGHT: 62 IN | RESPIRATION RATE: 20 BRPM

## 2021-04-07 DIAGNOSIS — G70.00 MYASTHENIA GRAVIS WITHOUT (ACUTE) EXACERBATION: Chronic | ICD-10-CM

## 2021-04-07 DIAGNOSIS — Z90.49 ACQUIRED ABSENCE OF OTHER SPECIFIED PARTS OF DIGESTIVE TRACT: Chronic | ICD-10-CM

## 2021-04-07 DIAGNOSIS — G36.9 ACUTE DISSEMINATED DEMYELINATION, UNSPECIFIED: ICD-10-CM

## 2021-04-07 LAB
25(OH)D3 SERPL-MCNC: 17.3 NG/ML
ALBUMIN SERPL ELPH-MCNC: 4.5 G/DL — SIGNIFICANT CHANGE UP (ref 3.3–5)
ALBUMIN SERPL ELPH-MCNC: 4.6 G/DL
ALP BLD-CCNC: 44 U/L
ALP SERPL-CCNC: 41 U/L — SIGNIFICANT CHANGE UP (ref 40–120)
ALT FLD-CCNC: 19 U/L — SIGNIFICANT CHANGE UP (ref 10–45)
ALT SERPL-CCNC: 19 U/L
ANION GAP SERPL CALC-SCNC: 15 MMOL/L — SIGNIFICANT CHANGE UP (ref 5–17)
APPEARANCE UR: CLEAR — SIGNIFICANT CHANGE UP
AST SERPL-CCNC: 13 U/L — SIGNIFICANT CHANGE UP (ref 10–40)
AST SERPL-CCNC: 14 U/L
BACTERIA # UR AUTO: ABNORMAL
BASOPHILS # BLD AUTO: 0.03 K/UL — SIGNIFICANT CHANGE UP (ref 0–0.2)
BASOPHILS # BLD AUTO: 0.04 K/UL
BASOPHILS NFR BLD AUTO: 0.3 % — SIGNIFICANT CHANGE UP (ref 0–2)
BASOPHILS NFR BLD AUTO: 0.4 %
BILIRUB DIRECT SERPL-MCNC: 0.1 MG/DL
BILIRUB INDIRECT SERPL-MCNC: 0.2 MG/DL
BILIRUB SERPL-MCNC: 0.2 MG/DL
BILIRUB SERPL-MCNC: 0.5 MG/DL — SIGNIFICANT CHANGE UP (ref 0.2–1.2)
BILIRUB UR-MCNC: NEGATIVE — SIGNIFICANT CHANGE UP
BUN SERPL-MCNC: 11 MG/DL — SIGNIFICANT CHANGE UP (ref 7–23)
CALCIUM SERPL-MCNC: 9.1 MG/DL — SIGNIFICANT CHANGE UP (ref 8.4–10.5)
CD16+CD56+ CELLS # BLD: 283 /UL
CD16+CD56+ CELLS NFR BLD: 18 %
CD19 CELLS NFR BLD: 18 /UL
CD3 CELLS # BLD: 1169 /UL
CD3 CELLS NFR BLD: 78 %
CD3+CD4+ CELLS # BLD: 802 /UL
CD3+CD4+ CELLS NFR BLD: 55 %
CD3+CD4+ CELLS/CD3+CD8+ CLL SPEC: 2.84 RATIO
CD3+CD8+ CELLS # SPEC: 282 /UL
CD3+CD8+ CELLS NFR BLD: 19 %
CELLS.CD3-CD19+/CELLS IN BLOOD: 1 %
CHLORIDE SERPL-SCNC: 105 MMOL/L — SIGNIFICANT CHANGE UP (ref 96–108)
CO2 SERPL-SCNC: 20 MMOL/L — LOW (ref 22–31)
COLOR SPEC: SIGNIFICANT CHANGE UP
CREAT SERPL-MCNC: 0.52 MG/DL — SIGNIFICANT CHANGE UP (ref 0.5–1.3)
DEPRECATED KAPPA LC FREE/LAMBDA SER: 0.99 RATIO
DIFF PNL FLD: NEGATIVE — SIGNIFICANT CHANGE UP
EOSINOPHIL # BLD AUTO: 0.15 K/UL — SIGNIFICANT CHANGE UP (ref 0–0.5)
EOSINOPHIL # BLD AUTO: 0.18 K/UL
EOSINOPHIL NFR BLD AUTO: 1.4 % — SIGNIFICANT CHANGE UP (ref 0–6)
EOSINOPHIL NFR BLD AUTO: 2 %
EPI CELLS # UR: 4 /HPF — SIGNIFICANT CHANGE UP
FOLATE SERPL-MCNC: 18.1 NG/ML — SIGNIFICANT CHANGE UP
GLUCOSE BLDC GLUCOMTR-MCNC: 158 MG/DL — HIGH (ref 70–99)
GLUCOSE BLDC GLUCOMTR-MCNC: 171 MG/DL — HIGH (ref 70–99)
GLUCOSE SERPL-MCNC: 120 MG/DL — HIGH (ref 70–99)
GLUCOSE UR QL: ABNORMAL
HCG SERPL-ACNC: <2 MIU/ML — SIGNIFICANT CHANGE UP
HCT VFR BLD CALC: 39.7 %
HCT VFR BLD CALC: 42.6 % — SIGNIFICANT CHANGE UP (ref 34.5–45)
HGB BLD-MCNC: 12.9 G/DL
HGB BLD-MCNC: 13.8 G/DL — SIGNIFICANT CHANGE UP (ref 11.5–15.5)
HYALINE CASTS # UR AUTO: 0 /LPF — SIGNIFICANT CHANGE UP (ref 0–2)
IGA SER QL IEP: 156 MG/DL
IGG SER QL IEP: 1156 MG/DL
IGM SER QL IEP: 115 MG/DL
IMM GRANULOCYTES NFR BLD AUTO: 0.3 %
IMM GRANULOCYTES NFR BLD AUTO: 0.5 % — SIGNIFICANT CHANGE UP (ref 0–1.5)
KAPPA LC CSF-MCNC: 1.78 MG/DL
KAPPA LC SERPL-MCNC: 1.77 MG/DL
KETONES UR-MCNC: NEGATIVE — SIGNIFICANT CHANGE UP
LEUKOCYTE ESTERASE UR-ACNC: ABNORMAL
LYMPHOCYTES # BLD AUTO: 1.28 K/UL — SIGNIFICANT CHANGE UP (ref 1–3.3)
LYMPHOCYTES # BLD AUTO: 1.41 K/UL
LYMPHOCYTES # BLD AUTO: 12 % — LOW (ref 13–44)
LYMPHOCYTES NFR BLD AUTO: 15.6 %
MAN DIFF?: NORMAL
MCHC RBC-ENTMCNC: 27.6 PG
MCHC RBC-ENTMCNC: 27.6 PG — SIGNIFICANT CHANGE UP (ref 27–34)
MCHC RBC-ENTMCNC: 32.4 GM/DL — SIGNIFICANT CHANGE UP (ref 32–36)
MCHC RBC-ENTMCNC: 32.5 GM/DL
MCV RBC AUTO: 85 FL
MCV RBC AUTO: 85.2 FL — SIGNIFICANT CHANGE UP (ref 80–100)
MONOCYTES # BLD AUTO: 0.65 K/UL — SIGNIFICANT CHANGE UP (ref 0–0.9)
MONOCYTES # BLD AUTO: 0.73 K/UL
MONOCYTES NFR BLD AUTO: 6.1 % — SIGNIFICANT CHANGE UP (ref 2–14)
MONOCYTES NFR BLD AUTO: 8.1 %
NEUTROPHILS # BLD AUTO: 6.62 K/UL
NEUTROPHILS # BLD AUTO: 8.47 K/UL — HIGH (ref 1.8–7.4)
NEUTROPHILS NFR BLD AUTO: 73.6 %
NEUTROPHILS NFR BLD AUTO: 79.7 % — HIGH (ref 43–77)
NITRITE UR-MCNC: NEGATIVE — SIGNIFICANT CHANGE UP
NRBC # BLD: 0 /100 WBCS — SIGNIFICANT CHANGE UP (ref 0–0)
PH UR: 6 — SIGNIFICANT CHANGE UP (ref 5–8)
PLATELET # BLD AUTO: 220 K/UL
PLATELET # BLD AUTO: 240 K/UL — SIGNIFICANT CHANGE UP (ref 150–400)
POTASSIUM SERPL-MCNC: 4 MMOL/L — SIGNIFICANT CHANGE UP (ref 3.5–5.3)
POTASSIUM SERPL-SCNC: 4 MMOL/L — SIGNIFICANT CHANGE UP (ref 3.5–5.3)
PROT SERPL-MCNC: 6.9 G/DL
PROT SERPL-MCNC: 7.7 G/DL — SIGNIFICANT CHANGE UP (ref 6–8.3)
PROT UR-MCNC: NEGATIVE — SIGNIFICANT CHANGE UP
RBC # BLD: 4.67 M/UL
RBC # BLD: 5 M/UL — SIGNIFICANT CHANGE UP (ref 3.8–5.2)
RBC # FLD: 12.9 %
RBC # FLD: 12.9 % — SIGNIFICANT CHANGE UP (ref 10.3–14.5)
RBC CASTS # UR COMP ASSIST: 4 /HPF — SIGNIFICANT CHANGE UP (ref 0–4)
SARS-COV-2 RNA SPEC QL NAA+PROBE: SIGNIFICANT CHANGE UP
SODIUM SERPL-SCNC: 140 MMOL/L — SIGNIFICANT CHANGE UP (ref 135–145)
SP GR SPEC: 1.04 — HIGH (ref 1.01–1.02)
T3 SERPL-MCNC: 171 NG/DL — SIGNIFICANT CHANGE UP (ref 80–200)
T4 AB SER-ACNC: 10 UG/DL — SIGNIFICANT CHANGE UP (ref 4.6–12)
TSH SERPL-ACNC: 4.16 UIU/ML
TSH SERPL-MCNC: 4.28 UIU/ML — HIGH (ref 0.27–4.2)
UROBILINOGEN FLD QL: NEGATIVE — SIGNIFICANT CHANGE UP
VIT B12 SERPL-MCNC: 631 PG/ML — SIGNIFICANT CHANGE UP (ref 232–1245)
WBC # BLD: 10.63 K/UL — HIGH (ref 3.8–10.5)
WBC # FLD AUTO: 10.63 K/UL — HIGH (ref 3.8–10.5)
WBC # FLD AUTO: 9.01 K/UL
WBC UR QL: 29 /HPF — HIGH (ref 0–5)

## 2021-04-07 PROCEDURE — 72156 MRI NECK SPINE W/O & W/DYE: CPT | Mod: 26

## 2021-04-07 PROCEDURE — 99284 EMERGENCY DEPT VISIT MOD MDM: CPT

## 2021-04-07 PROCEDURE — 99285 EMERGENCY DEPT VISIT HI MDM: CPT

## 2021-04-07 PROCEDURE — 72157 MRI CHEST SPINE W/O & W/DYE: CPT | Mod: 26

## 2021-04-07 RX ORDER — DEXTROSE 50 % IN WATER 50 %
25 SYRINGE (ML) INTRAVENOUS ONCE
Refills: 0 | Status: DISCONTINUED | OUTPATIENT
Start: 2021-04-07 | End: 2021-04-09

## 2021-04-07 RX ORDER — SODIUM CHLORIDE 9 MG/ML
1000 INJECTION, SOLUTION INTRAVENOUS
Refills: 0 | Status: DISCONTINUED | OUTPATIENT
Start: 2021-04-07 | End: 2021-04-09

## 2021-04-07 RX ORDER — GABAPENTIN 400 MG/1
300 CAPSULE ORAL ONCE
Refills: 0 | Status: COMPLETED | OUTPATIENT
Start: 2021-04-07 | End: 2021-04-07

## 2021-04-07 RX ORDER — ACETAMINOPHEN 500 MG
975 TABLET ORAL ONCE
Refills: 0 | Status: COMPLETED | OUTPATIENT
Start: 2021-04-07 | End: 2021-04-07

## 2021-04-07 RX ORDER — LEVOTHYROXINE SODIUM 125 MCG
25 TABLET ORAL DAILY
Refills: 0 | Status: DISCONTINUED | OUTPATIENT
Start: 2021-04-07 | End: 2021-04-09

## 2021-04-07 RX ORDER — ENOXAPARIN SODIUM 100 MG/ML
40 INJECTION SUBCUTANEOUS DAILY
Refills: 0 | Status: DISCONTINUED | OUTPATIENT
Start: 2021-04-07 | End: 2021-04-09

## 2021-04-07 RX ORDER — LIDOCAINE 4 G/100G
1 CREAM TOPICAL ONCE
Refills: 0 | Status: COMPLETED | OUTPATIENT
Start: 2021-04-07 | End: 2021-04-07

## 2021-04-07 RX ORDER — PANTOPRAZOLE SODIUM 20 MG/1
40 TABLET, DELAYED RELEASE ORAL
Refills: 0 | Status: DISCONTINUED | OUTPATIENT
Start: 2021-04-07 | End: 2021-04-09

## 2021-04-07 RX ORDER — GABAPENTIN 400 MG/1
300 CAPSULE ORAL THREE TIMES A DAY
Refills: 0 | Status: DISCONTINUED | OUTPATIENT
Start: 2021-04-07 | End: 2021-04-08

## 2021-04-07 RX ORDER — DEXTROSE 50 % IN WATER 50 %
15 SYRINGE (ML) INTRAVENOUS ONCE
Refills: 0 | Status: DISCONTINUED | OUTPATIENT
Start: 2021-04-07 | End: 2021-04-09

## 2021-04-07 RX ORDER — GLUCAGON INJECTION, SOLUTION 0.5 MG/.1ML
1 INJECTION, SOLUTION SUBCUTANEOUS ONCE
Refills: 0 | Status: DISCONTINUED | OUTPATIENT
Start: 2021-04-07 | End: 2021-04-09

## 2021-04-07 RX ORDER — CHOLECALCIFEROL (VITAMIN D3) 125 MCG
4000 CAPSULE ORAL DAILY
Refills: 0 | Status: DISCONTINUED | OUTPATIENT
Start: 2021-04-07 | End: 2021-04-09

## 2021-04-07 RX ORDER — INSULIN LISPRO 100/ML
VIAL (ML) SUBCUTANEOUS
Refills: 0 | Status: DISCONTINUED | OUTPATIENT
Start: 2021-04-07 | End: 2021-04-09

## 2021-04-07 RX ORDER — DEXTROSE 50 % IN WATER 50 %
12.5 SYRINGE (ML) INTRAVENOUS ONCE
Refills: 0 | Status: DISCONTINUED | OUTPATIENT
Start: 2021-04-07 | End: 2021-04-09

## 2021-04-07 RX ADMIN — Medication 975 MILLIGRAM(S): at 05:40

## 2021-04-07 RX ADMIN — Medication 1: at 21:45

## 2021-04-07 RX ADMIN — GABAPENTIN 300 MILLIGRAM(S): 400 CAPSULE ORAL at 05:39

## 2021-04-07 RX ADMIN — GABAPENTIN 300 MILLIGRAM(S): 400 CAPSULE ORAL at 21:45

## 2021-04-07 RX ADMIN — Medication 25 MICROGRAM(S): at 09:47

## 2021-04-07 RX ADMIN — Medication 116 MILLIGRAM(S): at 07:08

## 2021-04-07 RX ADMIN — LIDOCAINE 1 PATCH: 4 CREAM TOPICAL at 05:39

## 2021-04-07 NOTE — ED ADULT NURSE REASSESSMENT NOTE - NS ED NURSE REASSESS COMMENT FT1
pt received in [place] pending bed assignment alert states back still bothering her post steroid administrasion pt with warm packs to area of discomfort placed by patient pt no neuro deficits

## 2021-04-07 NOTE — H&P ADULT - HISTORY OF PRESENT ILLNESS
HPI: 40 year old right-handed F with a PMH of vitamin D deficiency, myasthenia gravis (s/p thymectomy), NMO (on inebilizumab), and hypothyroidism who presented on 4/7 due to sharp, stabbing, and burning pain radiating from her back to her abdomen for the past several days. Left torso involved more than right. Patient took 900 mg of gabapentin on 4/6, which helped somewhat. Patient states that she had felt body aches and low energy/fatigue several days prior to developing the pain. She states that she feels increased numbness in her torso and lower extremities. She denies any change in gait, recent illness, vision changes, rhinorrhea, cough, fevers, sick contacts, or recent travel. She does endorse chills. Patient currently denies any issues with bowel movements or urination. Patient's neurologist is Dr. Resendiz.     NMO history:  Patient presented to [facility] in 9/2020 with abnormal sensation/pain in torso and RLE followed by LLE numbness. MRI C/T spine showed LETM. LP performed and serum labs sent out. She was treated with IV solumedrol and IVIG. Patient tested positive for NMO in serum and CSF as well as JCV and HATTIE in serum. She was started on inebilizumab in 12/2020.      HPI: 40 year old right-handed F with a PMH of vitamin D deficiency, myasthenia gravis (s/p thymectomy), NMO (on inebilizumab), and hypothyroidism who presented on 4/7 due to sharp, stabbing, and burning pain radiating from her back to her abdomen for the past several days. Left torso involved more than right. Patient took 900 mg of gabapentin on 4/6, which helped somewhat. Patient states that she had felt body aches and low energy/fatigue several days prior to developing the pain. She states that she feels increased numbness in her torso and lower extremities. She denies any focal weakness, change in gait, recent illness, vision changes, rhinorrhea, cough, fevers, sick contacts, or recent travel. She does endorse chills. Patient currently denies any issues with bowel movements or urination. Patient's neurologist is Dr. Resendiz.     NMO history:  Patient presented to [facility] in 9/2020 with abnormal sensation/pain in torso and RLE followed by LLE numbness. MRI C/T spine showed LETM. LP performed and serum labs sent out. She was treated with IV solumedrol and IVIG. Patient tested positive for NMO in serum and CSF as well as JCV and HATTIE in serum. She was started on inebilizumab in 12/2020.

## 2021-04-07 NOTE — ED PROVIDER NOTE - CLINICAL SUMMARY MEDICAL DECISION MAKING FREE TEXT BOX
39yo female neuromyelitis p/w b/l thoracic back pain b/l x 1 week similar to last neuromyelitis flare. Chronic sensory deficit in LE's b/l, 5/5 strength. Concern for another flare. MR, steroids, pain control, labs and likely admit.

## 2021-04-07 NOTE — ED ADULT TRIAGE NOTE - CHIEF COMPLAINT QUOTE
generalized back pain, burning in nature, radiating to rib cage and increased numbness b/l lower extremities. Pt has hx "Neuromyelitis Optica."

## 2021-04-07 NOTE — ED PROVIDER NOTE - PROGRESS NOTE DETAILS
Yumiko PGY2: Consulted neuro, recommends MR thoracic spine w/without contrast. Yumiko PGY2: no beds in CDU. Will admit to neuro.

## 2021-04-07 NOTE — H&P ADULT - NSHPPHYSICALEXAM_GEN_ALL_CORE
VITALS & EXAMINATION:  Vital Signs Last 24 Hrs  T(C): 36.4 07 Apr 2021 04:39), Max: 36.4 (07 Apr 2021 04:39)  T(F): 97.6 (07 Apr 2021 04:39), Max: 97.6 (07 Apr 2021 04:39)  HR: 85 (07 Apr 2021 04:39) (85 - 85)  BP: 151/82 (07 Apr 2021 04:39) (151/82 - 151/82)  RR: 20 (07 Apr 2021 04:39) (20 - 20)  SpO2: 97% (07 Apr 2021 04:39) (97% - 97%)    General: Obese Female, appears stated age, in no apparent distress including pain    Neurological (>12):  MS: Awake, alert, oriented to person, place, situation, time. Anxious affect. Follows all commands.  Language: Speech is clear, fluent with good comprehension     CNs: PERRL (R = 3mm, L = 3mm). CVF full. EOMI no nystagmus. V1-3 intact to LT b/l. No facial asymmetry b/l. Hearing grossly normal (rubbing fingers) b/l. Symmetric palate elevation in midline. Gag reflex deferred. Head turning & shoulder shrug intact b/l. Tongue midline, normal movements, no atrophy.    Fundoscopic: deferred  Motor: Normal muscle bulk & tone. No pronator drift.              Deltoid	Biceps	Triceps	Wrist	Finger ABd	   R	5	5	5	5	5		5 	  L	5	5	5	5	5		5    	H-Flex	H-Ext	K-Flex	K-Ext	D-Flex	P-Flex  R	4+	5	5	5	5	5		 	   L	5	5	5	5	5	5			     Sensation: Intact to PP b/l throughout. Decreased to temperature (20% relative to hands in bilateral LE), absent to vibration in b/l LE, decreased JPS at b/l toes, intact JPS at ankles b/l     Sensory level assessment limited by lidocaine patch.   Cortical: Extinction on DSS (neglect): none  Reflexes:              Biceps(C5)       BR(C6)     Triceps(C7)               Patellar(L4)    Achilles(S1)    Plantar Resp  R	1	          1	             1		        1		    1		   L	1	          1	             1		        1		    1		     Coordination: No dysmetria to FTN/HTS b/l  Gait: Normal stance and gait.

## 2021-04-07 NOTE — ED PROVIDER NOTE - ATTENDING CONTRIBUTION TO CARE
Afebrile. Awake and Alert. Lungs CTA. Heart RRR. Abdomen soft NTND. CN II-XII grossly intact. Moves all extremities without lateralization. No mid-line C-T-L spine TTP. Decreased sensation to light touch from umbilicus down b/l LE.    r/o NMO flair  No focal spine tenderness, no trauma

## 2021-04-07 NOTE — ED PROVIDER NOTE - PHYSICAL EXAMINATION
Physical Exam:  Gen: NAD, AOx3, non-toxic appearing, able to ambulate without assistance  Head: NCAT  HEENT: EOMI, PEERLA, normal conjunctiva, tongue midline, oral mucosa moist  Lung: CTAB, no respiratory distress, no wheezes/rhonchi/rales B/L, speaking in full sentences  CV: RRR, no murmurs, rubs or gallops, distal pulses 2+ b/l  Abd: soft, NT, ND, no guarding, no rigidity, no rebound tenderness, no CVA tenderness   MSK: no visible deformities, ROM normal in UE/LE, diffuse thoracic spinal and paraspinal TTP  Neuro: CN II-XII intact, normal gait, 5/5 strength b/l, sensation decreased in LE's b/l  Skin: Warm, well perfused, no rash  Psych: normal affect, calm

## 2021-04-07 NOTE — ED PROVIDER NOTE - OBJECTIVE STATEMENT
39yo female hypothyroidism, neuromyelitis optica (last flare 9/2020 w/ residual b/l LE sensory deficit) p/w 1 week mid-back burning pain radiating around to abdomen above umbilicus L side more painful than R side similar to last flare. Finished steroid taper 1 month ago. Denies incontinence, weakness, difficulty walking. Told by Dr. Scales (neuro) to come to ED.

## 2021-04-07 NOTE — H&P ADULT - ASSESSMENT
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     Impression: Worsening bilateral LE numbness/painful paresthesias probably from spinal cord injury related to prior NMO LETM, rule out NMO flare    Plan:  Imaging:  MRI C, T, L spine w/ and w/o contrast (priority)  Obtain MRI orbits w/ and w/o     Medications:  If MRI positive for new lesions/enhancement then start solumedrol 1 g IV for 5 days. Consider further treatment depending on clinical response.   Topical lidocaine  Gabapentin 300 mg TID    Labs:  CBC, CMP, urinalysis    Other:  Outpatient neuro-immunology follow up with Dr. Astrid Vazquez  PT/OT    Vitamin D deficiency  Vitamin D 4000 units daily    Hypothyroidism  Continue synthroid  Check TFT's    MG  Monitor clinically    DVT prophylaxis:  Lovenox 40 mg subQ    Obtain screening lower extremity venous ultrasound in patients who meet 1 or more of the following criteria as patient is high risk for DVT/PE on admission:   [] History of DVT/PE  []Hypercoagulable states (Factor V Leiden, Cancer, OCP, etc. )  []Prolonged immobility (hemiplegia/hemiparesis/post operative or any other extended immobilization)  [] Transferred from outside facility (Rehab or Long term care)      Case to be discussed with neurology attending, Dr. 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 Physical exam significant for decreased sensation in the bilateral LE. Vital signs significant for BP of 151/82.     Impression: Worsening bilateral LE numbness/painful paresthesias probably from spinal cord injury related to prior NMO LETM, rule out NMO flare    Plan:  Imaging:  MRI C, T, L spine w/ and w/o contrast (priority)  Obtain MRI orbits w/ and w/o     Medications:  If MRI positive for new lesions/enhancement then start solumedrol 1 g IV for 5 days. Consider further treatment depending on clinical response.   Topical lidocaine  Gabapentin 300 mg TID    Labs:  CBC, CMP, urinalysis    Other:  Outpatient neuro-immunology follow up with Dr. Astrid Vazquez  PT/OT    Vitamin D deficiency  Vitamin D 4000 units daily    Hypothyroidism  Continue synthroid  Check TFT's    MG  Monitor clinically    DVT prophylaxis:  Lovenox 40 mg subQ    Obtain screening lower extremity venous ultrasound in patients who meet 1 or more of the following criteria as patient is high risk for DVT/PE on admission:   [] History of DVT/PE  []Hypercoagulable states (Factor V Leiden, Cancer, OCP, etc. )  []Prolonged immobility (hemiplegia/hemiparesis/post operative or any other extended immobilization)  [] Transferred from outside facility (Rehab or Long term care)      Case to be discussed with neurology attending, Dr. Mahajan

## 2021-04-07 NOTE — H&P ADULT - NSHPLABSRESULTS_GEN_ALL_CORE
LABORATORY:  CBC   Chem       LFTs   Coagulopathy   Lipid Panel   A1c   Cardiac enzymes     U/A   CSF  Immunological  Other    STUDIES & IMAGING:  Studies (EKG, EEG, EMG, etc):     Radiology (XR, CT, MR, U/S, TTE/KEIRA):

## 2021-04-07 NOTE — ED ADULT NURSE NOTE - OBJECTIVE STATEMENT
39 y/o F AAOX3 PMH myest[place] gravfelipe in 2005 presents ambulatory to ED c/o back pain. Pt states several days ago, she began experiencing sharp, stabbing mid back pain with radiation around to the stomach. Upon assessment, pt gross neuro intact, +ROM/sensation x4 extremities, face symmetrical, +strong hand , steady gait, states "I feel weak in my legs sometimes". See paper chart for neuro flowsheet. Pt breathing spontaneously, unlabored, SPO2 100% on RA. Pt connected to cardiac monitor, NSR with HR in the 80s noted. Denies chest pain, palpitations, SOB, HA, dizziness, chills, abdominal pain, n/v/d, urinary symptoms. Patient placed in position of comfort, bed locked and in lowest position. Call bell within reach.

## 2021-04-07 NOTE — ED ADULT NURSE NOTE - NSIMPLEMENTINTERV_GEN_ALL_ED
Implemented All Universal Safety Interventions:  [place] to call system. Call bell, personal items and telephone within reach. Instruct patient to call for assistance. Room bathroom lighting operational. Non-slip footwear when patient is off stretcher. Physically safe environment: no spills, clutter or unnecessary equipment. Stretcher in lowest position, wheels locked, appropriate side rails in place.

## 2021-04-07 NOTE — H&P ADULT - ATTENDING COMMENTS
Patient seen and examined with resident. Agree with above assessment and plan.  Patient showing significant improvement in overall status. Non focal exam  No further neurological workup advised at this time

## 2021-04-08 ENCOUNTER — NON-APPOINTMENT (OUTPATIENT)
Age: 41
End: 2021-04-08

## 2021-04-08 LAB
A1C WITH ESTIMATED AVERAGE GLUCOSE RESULT: 5.1 % — SIGNIFICANT CHANGE UP (ref 4–5.6)
COVID-19 SPIKE DOMAIN AB INTERP: POSITIVE
COVID-19 SPIKE DOMAIN ANTIBODY RESULT: >250 U/ML — HIGH
CULTURE RESULTS: SIGNIFICANT CHANGE UP
ESTIMATED AVERAGE GLUCOSE: 100 MG/DL — SIGNIFICANT CHANGE UP (ref 68–114)
GLUCOSE BLDC GLUCOMTR-MCNC: 108 MG/DL — HIGH (ref 70–99)
GLUCOSE BLDC GLUCOMTR-MCNC: 148 MG/DL — HIGH (ref 70–99)
GLUCOSE BLDC GLUCOMTR-MCNC: 160 MG/DL — HIGH (ref 70–99)
GLUCOSE BLDC GLUCOMTR-MCNC: 218 MG/DL — HIGH (ref 70–99)
SARS-COV-2 IGG+IGM SERPL QL IA: >250 U/ML — HIGH
SARS-COV-2 IGG+IGM SERPL QL IA: POSITIVE
SPECIMEN SOURCE: SIGNIFICANT CHANGE UP

## 2021-04-08 PROCEDURE — 99221 1ST HOSP IP/OBS SF/LOW 40: CPT

## 2021-04-08 RX ORDER — GABAPENTIN 400 MG/1
900 CAPSULE ORAL AT BEDTIME
Refills: 0 | Status: DISCONTINUED | OUTPATIENT
Start: 2021-04-08 | End: 2021-04-08

## 2021-04-08 RX ORDER — GABAPENTIN 400 MG/1
300 CAPSULE ORAL THREE TIMES A DAY
Refills: 0 | Status: DISCONTINUED | OUTPATIENT
Start: 2021-04-08 | End: 2021-04-09

## 2021-04-08 RX ADMIN — GABAPENTIN 300 MILLIGRAM(S): 400 CAPSULE ORAL at 21:31

## 2021-04-08 RX ADMIN — Medication 25 MICROGRAM(S): at 05:48

## 2021-04-08 RX ADMIN — Medication 4000 UNIT(S): at 12:41

## 2021-04-08 RX ADMIN — GABAPENTIN 300 MILLIGRAM(S): 400 CAPSULE ORAL at 05:46

## 2021-04-08 RX ADMIN — Medication 58 MILLIGRAM(S): at 06:04

## 2021-04-08 RX ADMIN — GABAPENTIN 300 MILLIGRAM(S): 400 CAPSULE ORAL at 12:42

## 2021-04-08 RX ADMIN — PANTOPRAZOLE SODIUM 40 MILLIGRAM(S): 20 TABLET, DELAYED RELEASE ORAL at 06:05

## 2021-04-08 RX ADMIN — Medication 2: at 16:48

## 2021-04-08 NOTE — PHYSICAL THERAPY INITIAL EVALUATION ADULT - DISCHARGE DISPOSITION, PT EVAL
Recommend Outpatient PT. PT explained differences/roles of home vs outpatient PT. Pt would like to see about home PT if available, but in agreement for outpatient PT.

## 2021-04-08 NOTE — PHYSICAL THERAPY INITIAL EVALUATION ADULT - GENERAL OBSERVATIONS, REHAB EVAL
pt travis 40 min eval well. pt rec'd at EOB, A&Ox4, NAD. pt observed ambulating around room independently while talking on cell phone. PT explained role of therapy, and outpatient vs home PT. PT reviewed standing therex at EOB with railing. Pt left at EOB, NAD, all lines intact, cb in reach, all needs met, rounding reinforced.

## 2021-04-08 NOTE — PHYSICAL THERAPY INITIAL EVALUATION ADULT - ADDITIONAL COMMENTS
pt states she lives in a private home with no steps to enter and first floor set up inside. Pt states prior to admission being independent with all functional mobility and ADLs. pt states she has a shower chair. pt reports she works and was driving prior to admission. pt denies owning any further DME. pt reports participating in outpatient PT in the past.

## 2021-04-08 NOTE — PROGRESS NOTE ADULT - ASSESSMENT
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 Physical exam significant for decreased sensation in the bilateral LE for multiple modalities, including proprioception and vibration. Vital signs significant for BP of 151/82.     Impression: Worsening bilateral LE numbness/painful paresthesias secondary to new demyelination versus residual demyelination/inflammatory change at T4-T6 segments in setting of known NMO, cannot rule out flare    Plan:  [] Continue IV Solumedrol Day 3/5 today  [x] MRI C/T spine w/w/o contrast  [x] GI prophylaxis  [x] Continue Gabapentin 900mg nightly, topical lidocaine  [x] Lovenox 40mg subq    Pt to follow up with Dr. Resendiz upon discharge.    Pt seen and discussed with Dr. Mahajan, neurology attending.    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 Physical exam significant for decreased sensation in the bilateral LE for multiple modalities, including proprioception and vibration. Vital signs significant for BP of 151/82.     Impression: Worsening bilateral LE numbness/painful paresthesias secondary to new demyelination versus residual demyelination/inflammatory change at T4-T6 segments in setting of known NMO, cannot rule out flare    Plan:  [] Continue IV Solumedrol Day 2/5 today  [x] MRI C/T spine w/w/o contrast  [x] GI prophylaxis  [x] Continue Gabapentin 900mg nightly, topical lidocaine  [x] Lovenox 40mg subq    Pt to follow up with Dr. Resendiz upon discharge.    Pt seen and discussed with Dr. Mahajan, neurology attending.    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 Physical exam significant for decreased sensation in the bilateral LE for multiple modalities, including proprioception and vibration. Vital signs significant for BP of 151/82.     Impression: Worsening bilateral LE numbness/painful paresthesias secondary to new demyelination versus residual demyelination/inflammatory change at T4-T6 segments, possibly flare of known NMO    Plan:  [] Continue IV Solumedrol Day 2/5 today  [x] MRI C/T spine w/w/o contrast  [x] GI prophylaxis  [x] Continue Gabapentin 300mg TID, topical lidocaine  [x] Lovenox 40mg subq    Pt to follow up with Dr. Resendiz upon discharge.    Pt seen and discussed with Dr. Mahajan, neurology attending.    Caroel Christopher DO  PGY-2  Neurology Resident

## 2021-04-08 NOTE — PROGRESS NOTE ADULT - SUBJECTIVE AND OBJECTIVE BOX
Neurology Progress Note    Patient is a 40y old  Female who presents with a chief complaint of r/o NMO flare (07 Apr 2021 06:09)      Interval History - No events overnight    Subjective:    Objective:   Vital Signs Last 24 Hrs  T(C): 36.7 (08 Apr 2021 04:45), Max: 37.5 (07 Apr 2021 09:13)  T(F): 98 (08 Apr 2021 04:45), Max: 99.5 (07 Apr 2021 09:13)  HR: 85 (08 Apr 2021 04:45) (85 - 100)  BP: 108/75 (08 Apr 2021 04:45) (106/64 - 127/89)  BP(mean): --  RR: 18 (08 Apr 2021 04:45) (18 - 20)  SpO2: 97% (08 Apr 2021 04:45) (93% - 97%)    General Exam:   PHYSICAL EXAM:  GENERAL: NAD  HEENT: Normocephalic;  conjunctivae and sclerae clear; moist mucous membranes;   NECK : supple  CHEST/LUNG: Clear to auscultation bilaterally with good air entry   HEART: S1 S2  regular; no murmurs, gallops or rubs  ABDOMEN: Soft, Nontender, Nondistended; Bowel sounds present  EXTREMITIES: no cyanosis; no edema; no calf tenderness  SKIN: warm and dry; no rash    NEURO EXAM:  - Mental Status: Alert, oriented to person, place, time, situation; speech is fluent with intact naming, repetition, and comprehension  - Cranial Nerves II-XII:    II:  PERRL 3mm b/l no APD; visual fields are full to confrontation  III, IV, VI:  EOMI, no nystagmus  V:  facial sensation is intact in the V1-V3 distribution bilaterally.  VII:  face is symmetric with normal eye closure and smile  VIII:  hearing is intact to finger rub  IX, X:  uvula is midline and soft palate rises symmetrically  XI:  head turning and shoulder shrug are intact bilaterally  XII:  tongue protrudes in the midline  - Motor:  strength is 5/5 throughout; normal muscle bulk and tone throughout; no pronator drift  - Reflexes:  1+ and symmetric at the biceps, triceps, brachioradialis, knees, and ankles; plantar reflexes downgoing bilaterally  - Sensory: Intact to PP b/l throughout. Decreased to temperature (20% relative to hands in bilateral LE), absent to vibration in b/l LE, decreased JPS at b/l toes, intact JPS at ankles b/l   - Coordination:  finger-nose-finger and heel-knee-shin intact without dysmetria; rapid alternating hand movements intact  - Gait:  normal steps, base, arm swing, and turning    Other:    04-07    140  |  105  |  11  ----------------------------<  120<H>  4.0   |  20<L>  |  0.52    Ca    9.1      07 Apr 2021 06:57    TPro  7.7  /  Alb  4.5  /  TBili  0.5  /  DBili  x   /  AST  13  /  ALT  19  /  AlkPhos  41  04-07 04-07    140  |  105  |  11  ----------------------------<  120<H>  4.0   |  20<L>  |  0.52    Ca    9.1      07 Apr 2021 06:57    TPro  7.7  /  Alb  4.5  /  TBili  0.5  /  DBili  x   /  AST  13  /  ALT  19  /  AlkPhos  41  04-07    LIVER FUNCTIONS - ( 07 Apr 2021 06:57 )  Alb: 4.5 g/dL / Pro: 7.7 g/dL / ALK PHOS: 41 U/L / ALT: 19 U/L / AST: 13 U/L / GGT: x                                 13.8   10.63 )-----------( 240      ( 07 Apr 2021 06:57 )             42.6     Radiology  MR Cervical Spine w/wo IV Cont (04.07.21 @ 17:15) >  IMPRESSION: Abnormal cord signal T2 to T8-9 with abnormal enhancement along the dorsal left portion of the cord T4-5 to T5-6 which appears slightly different when compared to the prior exam of 9/25/2020 which may be due to a separate episode of demyelination versus residual demyelination/inflammatory change.    MEDICATIONS  (STANDING):  cholecalciferol 4000 Unit(s) Oral daily  dextrose 40% Gel 15 Gram(s) Oral once  dextrose 5%. 1000 milliLiter(s) (50 mL/Hr) IV Continuous <Continuous>  dextrose 5%. 1000 milliLiter(s) (100 mL/Hr) IV Continuous <Continuous>  dextrose 50% Injectable 25 Gram(s) IV Push once  dextrose 50% Injectable 12.5 Gram(s) IV Push once  dextrose 50% Injectable 25 Gram(s) IV Push once  enoxaparin Injectable 40 milliGRAM(s) SubCutaneous daily  gabapentin 300 milliGRAM(s) Oral three times a day  glucagon  Injectable 1 milliGRAM(s) IntraMuscular once  insulin lispro (ADMELOG) corrective regimen sliding scale   SubCutaneous three times a day before meals  levothyroxine 25 MICROGram(s) Oral daily  methylPREDNISolone sodium succinate IVPB 1000 milliGRAM(s) IV Intermittent daily  pantoprazole    Tablet 40 milliGRAM(s) Oral before breakfast    MEDICATIONS  (PRN):  aluminum hydroxide/magnesium hydroxide/simethicone Suspension 30 milliLiter(s) Oral daily PRN Dyspepsia  bisacodyl 5 milliGRAM(s) Oral every 12 hours PRN Constipation            Neurology Progress Note    Interval History - No events overnight    Subjective:    Objective:   Vital Signs Last 24 Hrs  T(C): 36.7 (08 Apr 2021 04:45), Max: 37.5 (07 Apr 2021 09:13)  T(F): 98 (08 Apr 2021 04:45), Max: 99.5 (07 Apr 2021 09:13)  HR: 85 (08 Apr 2021 04:45) (85 - 100)  BP: 108/75 (08 Apr 2021 04:45) (106/64 - 127/89)  BP(mean): --  RR: 18 (08 Apr 2021 04:45) (18 - 20)  SpO2: 97% (08 Apr 2021 04:45) (93% - 97%)    General Exam:   PHYSICAL EXAM:  GENERAL: NAD  HEENT: Normocephalic;  conjunctivae and sclerae clear; moist mucous membranes;   NECK : supple  CHEST/LUNG: Clear to auscultation bilaterally with good air entry   HEART: S1 S2  regular; no murmurs, gallops or rubs  ABDOMEN: Soft, Nontender, Nondistended; Bowel sounds present  EXTREMITIES: no cyanosis; no edema; no calf tenderness  SKIN: warm and dry; no rash    NEURO EXAM:  - Mental Status: Alert, oriented to person, place, time, situation; speech is fluent with intact naming, repetition, and comprehension  - Cranial Nerves II-XII:    II:  PERRL 3mm b/l no APD; visual fields are full to confrontation  III, IV, VI:  EOMI, no nystagmus  V:  facial sensation is intact in the V1-V3 distribution bilaterally.  VII:  face is symmetric with normal eye closure and smile  VIII:  hearing is intact to finger rub  IX, X:  uvula is midline and soft palate rises symmetrically  XI:  head turning and shoulder shrug are intact bilaterally  XII:  tongue protrudes in the midline  - Motor:  strength is 5/5 throughout; normal muscle bulk and tone throughout; no pronator drift  - Reflexes:  1+ and symmetric at the biceps, triceps, brachioradialis, knees, and ankles; plantar reflexes downgoing bilaterally  - Sensory: Intact to PP b/l throughout. Decreased to temperature (20% relative to hands in bilateral LE), absent to vibration in b/l LE, decreased JPS at b/l toes, intact JPS at ankles b/l   - Coordination:  finger-nose-finger and heel-knee-shin intact without dysmetria; rapid alternating hand movements intact  - Gait:  normal steps, base, arm swing, and turning    Other:    04-07    140  |  105  |  11  ----------------------------<  120<H>  4.0   |  20<L>  |  0.52    Ca    9.1      07 Apr 2021 06:57    TPro  7.7  /  Alb  4.5  /  TBili  0.5  /  DBili  x   /  AST  13  /  ALT  19  /  AlkPhos  41  04-07 04-07    140  |  105  |  11  ----------------------------<  120<H>  4.0   |  20<L>  |  0.52    Ca    9.1      07 Apr 2021 06:57    TPro  7.7  /  Alb  4.5  /  TBili  0.5  /  DBili  x   /  AST  13  /  ALT  19  /  AlkPhos  41  04-07    LIVER FUNCTIONS - ( 07 Apr 2021 06:57 )  Alb: 4.5 g/dL / Pro: 7.7 g/dL / ALK PHOS: 41 U/L / ALT: 19 U/L / AST: 13 U/L / GGT: x                                 13.8   10.63 )-----------( 240      ( 07 Apr 2021 06:57 )             42.6     Radiology  MR Cervical Spine w/wo IV Cont (04.07.21 @ 17:15) >  IMPRESSION: Abnormal cord signal T2 to T8-9 with abnormal enhancement along the dorsal left portion of the cord T4-5 to T5-6 which appears slightly different when compared to the prior exam of 9/25/2020 which may be due to a separate episode of demyelination versus residual demyelination/inflammatory change.    MEDICATIONS  (STANDING):  cholecalciferol 4000 Unit(s) Oral daily  dextrose 40% Gel 15 Gram(s) Oral once  dextrose 5%. 1000 milliLiter(s) (50 mL/Hr) IV Continuous <Continuous>  dextrose 5%. 1000 milliLiter(s) (100 mL/Hr) IV Continuous <Continuous>  dextrose 50% Injectable 25 Gram(s) IV Push once  dextrose 50% Injectable 12.5 Gram(s) IV Push once  dextrose 50% Injectable 25 Gram(s) IV Push once  enoxaparin Injectable 40 milliGRAM(s) SubCutaneous daily  gabapentin 300 milliGRAM(s) Oral three times a day  glucagon  Injectable 1 milliGRAM(s) IntraMuscular once  insulin lispro (ADMELOG) corrective regimen sliding scale   SubCutaneous three times a day before meals  levothyroxine 25 MICROGram(s) Oral daily  methylPREDNISolone sodium succinate IVPB 1000 milliGRAM(s) IV Intermittent daily  pantoprazole    Tablet 40 milliGRAM(s) Oral before breakfast    MEDICATIONS  (PRN):  aluminum hydroxide/magnesium hydroxide/simethicone Suspension 30 milliLiter(s) Oral daily PRN Dyspepsia  bisacodyl 5 milliGRAM(s) Oral every 12 hours PRN Constipation            Neurology Progress Note    Interval History - No events overnight    Subjective:  Pt was seen and examined at bedside. She reports pain resolved, numbness remains the same. She states left leg feel heavier. No difficulty ambulating.    Objective:   Vital Signs Last 24 Hrs  T(C): 36.7 (08 Apr 2021 04:45), Max: 37.5 (07 Apr 2021 09:13)  T(F): 98 (08 Apr 2021 04:45), Max: 99.5 (07 Apr 2021 09:13)  HR: 85 (08 Apr 2021 04:45) (85 - 100)  BP: 108/75 (08 Apr 2021 04:45) (106/64 - 127/89)  BP(mean): --  RR: 18 (08 Apr 2021 04:45) (18 - 20)  SpO2: 97% (08 Apr 2021 04:45) (93% - 97%)    General Exam:   PHYSICAL EXAM:  GENERAL: NAD  HEENT: Normocephalic;  conjunctivae and sclerae clear; moist mucous membranes;   EXTREMITIES: no cyanosis; no edema; no calf tenderness  SKIN: warm and dry; no rash    NEURO EXAM:  - Mental Status: Alert, oriented to person, place, time, situation; speech is fluent with intact naming, repetition, and comprehension  - Cranial Nerves II-XII:    II:  PERRL 3mm b/l no APD; visual fields are full to confrontation  III, IV, VI:  EOMI, no nystagmus  V:  facial sensation is intact in the V1-V3 distribution bilaterally.  VII:  face is symmetric with normal eye closure and smile  VIII:  hearing is intact to finger rub  IX, X:  uvula is midline and soft palate rises symmetrically  XI:  head turning and shoulder shrug are intact bilaterally  XII:  tongue protrudes in the midline  - Motor:  strength is 5/5 throughout; normal muscle bulk and tone throughout; no pronator drift  - Reflexes:  1+ and symmetric at the biceps, triceps, brachioradialis, knees, and ankles; plantar reflexes downgoing bilaterally  - Sensory: Intact to PP b/l throughout. Decreased to temperature (20% relative to hands in bilateral LE), absent to vibration in b/l LE, decreased JPS at b/l toes, intact JPS at ankles b/l   - Coordination:  finger-nose-finger and heel-knee-shin intact without dysmetria; rapid alternating hand movements intact  - Gait:  normal steps, base, arm swing, and turning    Other:    04-07    140  |  105  |  11  ----------------------------<  120<H>  4.0   |  20<L>  |  0.52    Ca    9.1      07 Apr 2021 06:57    TPro  7.7  /  Alb  4.5  /  TBili  0.5  /  DBili  x   /  AST  13  /  ALT  19  /  AlkPhos  41  04-07 04-07    140  |  105  |  11  ----------------------------<  120<H>  4.0   |  20<L>  |  0.52    Ca    9.1      07 Apr 2021 06:57    TPro  7.7  /  Alb  4.5  /  TBili  0.5  /  DBili  x   /  AST  13  /  ALT  19  /  AlkPhos  41  04-07    LIVER FUNCTIONS - ( 07 Apr 2021 06:57 )  Alb: 4.5 g/dL / Pro: 7.7 g/dL / ALK PHOS: 41 U/L / ALT: 19 U/L / AST: 13 U/L / GGT: x                                 13.8   10.63 )-----------( 240      ( 07 Apr 2021 06:57 )             42.6     Radiology  MR Cervical Spine w/wo IV Cont (04.07.21 @ 17:15) >  IMPRESSION: Abnormal cord signal T2 to T8-9 with abnormal enhancement along the dorsal left portion of the cord T4-5 to T5-6 which appears slightly different when compared to the prior exam of 9/25/2020 which may be due to a separate episode of demyelination versus residual demyelination/inflammatory change.    MEDICATIONS  (STANDING):  cholecalciferol 4000 Unit(s) Oral daily  dextrose 40% Gel 15 Gram(s) Oral once  dextrose 5%. 1000 milliLiter(s) (50 mL/Hr) IV Continuous <Continuous>  dextrose 5%. 1000 milliLiter(s) (100 mL/Hr) IV Continuous <Continuous>  dextrose 50% Injectable 25 Gram(s) IV Push once  dextrose 50% Injectable 12.5 Gram(s) IV Push once  dextrose 50% Injectable 25 Gram(s) IV Push once  enoxaparin Injectable 40 milliGRAM(s) SubCutaneous daily  gabapentin 300 milliGRAM(s) Oral three times a day  glucagon  Injectable 1 milliGRAM(s) IntraMuscular once  insulin lispro (ADMELOG) corrective regimen sliding scale   SubCutaneous three times a day before meals  levothyroxine 25 MICROGram(s) Oral daily  methylPREDNISolone sodium succinate IVPB 1000 milliGRAM(s) IV Intermittent daily  pantoprazole    Tablet 40 milliGRAM(s) Oral before breakfast    MEDICATIONS  (PRN):  aluminum hydroxide/magnesium hydroxide/simethicone Suspension 30 milliLiter(s) Oral daily PRN Dyspepsia  bisacodyl 5 milliGRAM(s) Oral every 12 hours PRN Constipation

## 2021-04-08 NOTE — PHYSICAL THERAPY INITIAL EVALUATION ADULT - PRECAUTIONS/LIMITATIONS, REHAB EVAL
HISTORY AND RESULTS CONTINUED: MRI CERVICAL/THORACIC SPINE: Abnormal cord signal T2 to T8-9 with abnormal enhancement along the dorsal left portion of the cord T4-5 to T5-6 which appears slightly different when compared to the prior exam of 9/25/2020 which may be due to a separate episode of demyelination versus residual demyelination/inflammatory change./fall precautions

## 2021-04-08 NOTE — PHYSICAL THERAPY INITIAL EVALUATION ADULT - PERTINENT HX OF CURRENT PROBLEM, REHAB EVAL
39 y/o right-handed F with h/o vitamin D deficiency, myasthenia gravis (s/p thymectomy), NMO, and hypothyroidism p/w sharp, stabbing, and burning pain radiating from her back to her abdomen for the past several days. Pt with concerns for new demyelination vs residual demyelination/inflammatory change at T4-T6 segments, possibly flare of known NMO.

## 2021-04-09 ENCOUNTER — TRANSCRIPTION ENCOUNTER (OUTPATIENT)
Age: 41
End: 2021-04-09

## 2021-04-09 VITALS
SYSTOLIC BLOOD PRESSURE: 101 MMHG | TEMPERATURE: 98 F | DIASTOLIC BLOOD PRESSURE: 69 MMHG | OXYGEN SATURATION: 96 % | HEART RATE: 84 BPM | RESPIRATION RATE: 18 BRPM

## 2021-04-09 PROBLEM — G36.0 NEUROMYELITIS OPTICA [DEVIC]: Chronic | Status: ACTIVE | Noted: 2021-04-07

## 2021-04-09 LAB
GLUCOSE BLDC GLUCOMTR-MCNC: 102 MG/DL — HIGH (ref 70–99)
HLX FLT3 FINAL REPORT: SIGNIFICANT CHANGE UP

## 2021-04-09 PROCEDURE — 82746 ASSAY OF FOLIC ACID SERUM: CPT

## 2021-04-09 PROCEDURE — U0003: CPT

## 2021-04-09 PROCEDURE — 82962 GLUCOSE BLOOD TEST: CPT

## 2021-04-09 PROCEDURE — 97161 PT EVAL LOW COMPLEX 20 MIN: CPT

## 2021-04-09 PROCEDURE — 72157 MRI CHEST SPINE W/O & W/DYE: CPT

## 2021-04-09 PROCEDURE — 87086 URINE CULTURE/COLONY COUNT: CPT

## 2021-04-09 PROCEDURE — 82607 VITAMIN B-12: CPT

## 2021-04-09 PROCEDURE — 81001 URINALYSIS AUTO W/SCOPE: CPT

## 2021-04-09 PROCEDURE — 84436 ASSAY OF TOTAL THYROXINE: CPT

## 2021-04-09 PROCEDURE — 72156 MRI NECK SPINE W/O & W/DYE: CPT

## 2021-04-09 PROCEDURE — 86769 SARS-COV-2 COVID-19 ANTIBODY: CPT

## 2021-04-09 PROCEDURE — 85025 COMPLETE CBC W/AUTO DIFF WBC: CPT

## 2021-04-09 PROCEDURE — G0378: CPT

## 2021-04-09 PROCEDURE — 84480 ASSAY TRIIODOTHYRONINE (T3): CPT

## 2021-04-09 PROCEDURE — A9585: CPT

## 2021-04-09 PROCEDURE — 84443 ASSAY THYROID STIM HORMONE: CPT

## 2021-04-09 PROCEDURE — 80053 COMPREHEN METABOLIC PANEL: CPT

## 2021-04-09 PROCEDURE — 81245 FLT3 GENE: CPT

## 2021-04-09 PROCEDURE — 83036 HEMOGLOBIN GLYCOSYLATED A1C: CPT

## 2021-04-09 PROCEDURE — 84702 CHORIONIC GONADOTROPIN TEST: CPT

## 2021-04-09 PROCEDURE — 99285 EMERGENCY DEPT VISIT HI MDM: CPT | Mod: 25

## 2021-04-09 RX ORDER — GABAPENTIN 400 MG/1
1 CAPSULE ORAL
Qty: 90 | Refills: 0
Start: 2021-04-09 | End: 2021-05-08

## 2021-04-09 RX ORDER — CHOLECALCIFEROL (VITAMIN D3) 125 MCG
4000 CAPSULE ORAL
Qty: 0 | Refills: 0 | DISCHARGE
Start: 2021-04-09

## 2021-04-09 RX ORDER — GABAPENTIN 400 MG/1
1 CAPSULE ORAL
Qty: 0 | Refills: 0 | DISCHARGE
Start: 2021-04-09

## 2021-04-09 RX ORDER — PANTOPRAZOLE SODIUM 20 MG/1
1 TABLET, DELAYED RELEASE ORAL
Qty: 30 | Refills: 0
Start: 2021-04-09 | End: 2021-05-08

## 2021-04-09 RX ORDER — PANTOPRAZOLE SODIUM 20 MG/1
1 TABLET, DELAYED RELEASE ORAL
Qty: 0 | Refills: 0 | DISCHARGE
Start: 2021-04-09

## 2021-04-09 RX ADMIN — Medication 58 MILLIGRAM(S): at 06:31

## 2021-04-09 RX ADMIN — Medication 25 MICROGRAM(S): at 06:00

## 2021-04-09 RX ADMIN — GABAPENTIN 300 MILLIGRAM(S): 400 CAPSULE ORAL at 06:00

## 2021-04-09 RX ADMIN — PANTOPRAZOLE SODIUM 40 MILLIGRAM(S): 20 TABLET, DELAYED RELEASE ORAL at 06:00

## 2021-04-09 NOTE — DISCHARGE NOTE PROVIDER - YES NO FOR MLM POSITIVE OR NEGATIVE COVID RESULT
ANESTHESIA INTUBATION  Urgency: elective      General Information and Staff    Patient location during procedure: OR  Anesthesiologist: Zayda Colin MD  Resident/CRNA: Angela Griffiths CRNA  Performed: CRNA     Indications and Patient Condition  Indicati ,

## 2021-04-09 NOTE — DISCHARGE NOTE NURSING/CASE MANAGEMENT/SOCIAL WORK - PATIENT PORTAL LINK FT
You can access the FollowMyHealth Patient Portal offered by [facility] by registering at the following website: [email or web address]. By joining [facility]’s FollowMyHealth portal, you will also be able to view your health information using other applications (apps) compatible with our system.

## 2021-04-09 NOTE — DISCHARGE NOTE PROVIDER - NSDCFUSCHEDAPPT_GEN_ALL_CORE_FT
BALDO CAMILO ; 04/17/2021 ; NPP Rad  Opd BALDO Del Rio ; 06/03/2021 ; NPP Neuro [facility]

## 2021-04-09 NOTE — DISCHARGE NOTE PROVIDER - NSDCMRMEDTOKEN_GEN_ALL_CORE_FT
bisacodyl 5 mg oral delayed release tablet: 1 tab(s) orally every 12 hours, As needed, Constipation  lidocaine 5% topical film: Apply topically to affected area once a day  Neurontin 300 mg oral capsule: 1 cap(s) orally 3 times a day  Outpatient PT: Outpatient PT    Protonix 40 mg oral delayed release tablet: 1 tab(s) orally once a day (before a meal)  Synthroid 25 mcg (0.025 mg) oral tablet: 1 tab(s) orally once a day in the morning before breakfast  Thera-D Rapid Repletion oral tablet: 4000 unit(s) orally once a day

## 2021-04-09 NOTE — DISCHARGE NOTE PROVIDER - HOSPITAL COURSE
History of Present Illness:   40 year old right-handed F with a PMH of vitamin D deficiency, myasthenia gravis (s/p thymectomy), NMO (on inebilizumab), and hypothyroidism who presented on 4/7 due to sharp, stabbing, and burning pain radiating from her back to her abdomen for the past several days. Left torso involved more than right. Patient took 900 mg of gabapentin on 4/6, which helped somewhat. Patient states that she had felt body aches and low energy/fatigue several days prior to developing the pain. She states that she feels increased numbness in her torso and lower extremities. She denies any focal weakness, change in gait, recent illness, vision changes, rhinorrhea, cough, fevers, sick contacts, or recent travel. She does endorse chills. Patient currently denies any issues with bowel movements or urination. Patient's neurologist is Dr. Resendiz.     NMO history:  Patient presented to [facility] in 9/2020 with abnormal sensation/pain in torso and RLE followed by LLE numbness. MRI C/T spine showed LETM. LP performed and serum labs sent out. She was treated with IV solumedrol and IVIG. Patient tested positive for NMO in serum and CSF as well as JCV and HATTIE in serum. She was started on inebilizumab in 12/2020.     RADIOLOGY:  MR Cervical and Thoracic Spine w/wo IV Cont (04.07.21 @ 17:15)   IMPRESSION: Abnormal cord signal T2 to T8-9 with abnormal enhancement along the dorsal left portion of the cord T4-5 to T5-6 which appears slightly different when compared to the prior exam of 9/25/2020 which may be due to a separate episode of demyelination versus residual demyelination/inflammatory change.    Impression: Worsening bilateral LE numbness/painful paresthesias secondary to new demyelination versus residual demyelination/inflammatory change at T4-T6 segments, possibly flare of known NMO    Hospital Course:  Pt completed course of IV Solumedrol (4/7-4/9) with significant improvement in back pain and reported residual numbness similar to what she experiences prior to admission. Gabapentin 300mg TID was continued. Pt remained stable, tolerated treatment well. She ambulated with no difficulty.    Per physical therapy evaluation, outpatient PT referral was provided.    Pt to follow up with Dr. Resendiz in 1-2 weeks after discharge.

## 2021-04-09 NOTE — DISCHARGE NOTE PROVIDER - CARE PROVIDER_API CALL
Jennifer Resendiz)  Neurology  [address] [place] [address]  Phone: [phone]  Fax: [phone]  Established Patient  Follow Up Time: 1 week

## 2021-04-09 NOTE — DISCHARGE NOTE PROVIDER - NSDCCPCAREPLAN_GEN_ALL_CORE_FT
PRINCIPAL DISCHARGE DIAGNOSIS  Diagnosis: Neuromyelitis optica  Assessment and Plan of Treatment: Please continue steroid taper as prescribed. Follow up with Astrid within 1-2 weeks of discharge.

## 2021-04-17 ENCOUNTER — APPOINTMENT (OUTPATIENT)
Dept: MRI IMAGING | Facility: CLINIC | Age: 41
End: 2021-04-17

## 2021-04-19 ENCOUNTER — APPOINTMENT (OUTPATIENT)
Dept: NEUROLOGY | Facility: CLINIC | Age: 41
End: 2021-04-19

## 2021-04-19 ENCOUNTER — APPOINTMENT (OUTPATIENT)
Dept: NEUROLOGY | Facility: CLINIC | Age: 41
End: 2021-04-19
Payer: COMMERCIAL

## 2021-04-19 VITALS
HEART RATE: 84 BPM | BODY MASS INDEX: 30.91 KG/M2 | WEIGHT: 168 LBS | SYSTOLIC BLOOD PRESSURE: 117 MMHG | DIASTOLIC BLOOD PRESSURE: 83 MMHG | HEIGHT: 62 IN

## 2021-04-19 PROCEDURE — 99072 ADDL SUPL MATRL&STAF TM PHE: CPT

## 2021-04-19 PROCEDURE — 99215 OFFICE O/P EST HI 40 MIN: CPT

## 2021-04-19 PROCEDURE — 99417 PROLNG OP E/M EACH 15 MIN: CPT

## 2021-04-19 NOTE — HISTORY OF PRESENT ILLNESS
[FreeTextEntry1] : Initial hx 10/2020\par hx of myasthenia gravis - dx'd in 2005 with drooping of the L eye, weakness in both arms and legs, trouble swallowing. started imuran, prednisone, and mestinon X8mon, then thymectomy and has no longer needed meds. occ SOB residual.\par 9/2020 - developed a HA. several days later, developed abnormal sensations on the torso, burning pain, then RLE and LLE became numb, then torso became numb. Went to ED, sent home with pain medication. Then saw Dr. Pompa, ordered spine and brain MRI but wasn't able to get it before she had worsened burning sensation. Went to ED, had MRIs which showed myelitis. Got IVMP, then short prednisone taper. Burning subsided. \par Since then, has had loss of sensation from the torso down. \par Some "disturbing" electrical sensation provoked by movement. \par Stress worsens sx.\par Started PT yesterday. \par Started uplizna 12/2020\par 1/13/21 - COVID sx started friday. dx'd yesterday after covid pcr positive. generalized weakness, pressure in nose. no fevers, +cough, no SOB. + disturbance of smell and taste but may be due to congestion. will likely get antibody cocktail tomorrow. \par tapered off prednisone in mid 3/2021.\par 3/2021 - For past 3wks, when looking right and left, sees "smoke" in her vision. Some eye pain. Involving both eyes. Some conjunctival injection. Appetite has been poor. Diarrhea last week.\par 4/2021 - Over past 2d, sharp pain in the mid back behind ribs, burning on L torso. Today, mid-back is gone but burning of L torso persists. this is intermittent lasting seconds at a time. Some increased numbness in the legs., no BB dysfunction. Went to ED, had MRI T spine showing new keo enhancement consistent with relapse. Got IVMP with some improvement. CD19 (not CD20) was 18 cells (1%).\par \par Subj interval:\par \par Pain resolved with steroids, numbness has not improved.\par \par Uplizna since 12/2020.\par \par ROS/Current Sx:\par - loss of sensation from the torso down. \par - stress worsens sx.\par no BB dysfunction\par + gait dysfunction\par + constipation 2/2 numbness\par \par PMHX:\par myasthenia gravis\par myelitis\par hypothyroid\par \par MEDS:\par synthroid\par uplizna\par prednisone 40mg\par \par SHx: 1x/wk tob, no etoh, no drugs. 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 \par \par O:\par \par AO3. Normally conversant. Follows commands, names, and repeats. Good attention.\par face symm\par moves all 4 well\par gait slightly unstable but walks well without assistance\par \par 10/2020\par ESR\par b12 wnl\par TSh high but T4 wnl\par vitD 16\par HATTIE 1:320\par AQP4+\par ssa/ssb neg\par HTLV neg\par JCV+\par \par 9/2020\par copper wnl\par ACE wnl\par dsdna neg\par RF wnl\par HATTIE 1:640\par \par CSF 9/2020\par glucose nl\par prot 44\par absent OCBs\par igg index/synth wnl\par WNV neg\par WBC 26\par VDRL neg\par lyme neg\par AQP4+\par \par MRI brain 9/2020 - unrevealing\par \par MRI C+T 9/2020 - longitudinally extensive myelitis\par \par \par AP: NMO (LETM in 9/2020, apq4 + in both serum and CSF), hx of MG as well, +HATTIE. Started uplizna in 12/2020. New relapse on uplizna in 4/2021 but with Bcells at 1% (18) suggesting either lack of depletion or early re-population.\par \par This is a complicated case. We had discussed switch to rituximab vs soliris vs treating earlier with uplizna. She opted for earlier treatment with uplizna, with very close monitoring of B cell count.\par \par Answered all questions, education provided re: dx, prognosis, management, pregnancy planning. management discussed at length. MRI results and blood work results discussed with pt. Form for work filled out personally at visit.\par \par - cont uplizna, needs dose earlier (getting it this week)\par - cont prednisone taper (5wk taper)\par - check CD20 count in 1 month, then monthly\par - vitD 4ku daily for vitD def\par - pt agreed to be contacted re: NENMOC*\par - counseled on planned pregnancy - advised contraception when within 3m of uplizna.\par - RTC 6wks\par

## 2021-04-21 ENCOUNTER — APPOINTMENT (OUTPATIENT)
Dept: NEUROLOGY | Facility: CLINIC | Age: 41
End: 2021-04-21
Payer: COMMERCIAL

## 2021-04-21 LAB
HCG UR QL: NEGATIVE
QUALITY CONTROL: YES

## 2021-04-21 PROCEDURE — 81025 URINE PREGNANCY TEST: CPT

## 2021-04-21 PROCEDURE — 99072 ADDL SUPL MATRL&STAF TM PHE: CPT

## 2021-04-21 PROCEDURE — S0028: CPT

## 2021-04-21 PROCEDURE — 96413 CHEMO IV INFUSION 1 HR: CPT

## 2021-04-21 PROCEDURE — 96415 CHEMO IV INFUSION ADDL HR: CPT

## 2021-05-02 ENCOUNTER — TRANSCRIPTION ENCOUNTER (OUTPATIENT)
Age: 41
End: 2021-05-02

## 2021-05-05 ENCOUNTER — APPOINTMENT (OUTPATIENT)
Dept: NEUROLOGY | Facility: CLINIC | Age: 41
End: 2021-05-05
Payer: COMMERCIAL

## 2021-05-05 PROCEDURE — 99215 OFFICE O/P EST HI 40 MIN: CPT | Mod: 95

## 2021-05-05 NOTE — HISTORY OF PRESENT ILLNESS
[FreeTextEntry1] : Initial hx 10/2020\par hx of myasthenia gravis - dx'd in 2005 with drooping of the L eye, weakness in both arms and legs, trouble swallowing. started imuran, prednisone, and mestinon X8mon, then thymectomy and has no longer needed meds. occ SOB residual.\par 9/2020 - developed a HA. several days later, developed abnormal sensations on the torso, burning pain, then RLE and LLE became numb, then torso became numb. Went to ED, sent home with pain medication. Then saw Dr. Pompa, ordered spine and brain MRI but wasn't able to get it before she had worsened burning sensation. Went to ED, had MRIs which showed myelitis. Got IVMP, then short prednisone taper. Burning subsided. \par Since then, has had loss of sensation from the torso down. \par Some "disturbing" electrical sensation provoked by movement. \par Stress worsens sx.\par Started PT yesterday. \par Started uplizna 12/2020\par 1/13/21 - COVID sx started friday. dx'd yesterday after covid pcr positive. generalized weakness, pressure in nose. no fevers, +cough, no SOB. + disturbance of smell and taste but may be due to congestion. will likely get antibody cocktail tomorrow. \par tapered off prednisone in mid 3/2021.\par 3/2021 - For past 3wks, when looking right and left, sees "smoke" in her vision. Some eye pain. Involving both eyes. Some conjunctival injection. Appetite has been poor. Diarrhea last week.\par 4/2021 - Over past 2d, sharp pain in the mid back behind ribs, burning on L torso. Today, mid-back is gone but burning of L torso persists. this is intermittent lasting seconds at a time. Some increased numbness in the legs., no BB dysfunction. Went to ED, had MRI T spine showing new keo enhancement consistent with relapse. Got IVMP with substantial improvement in burning. CD19 (not CD20, but drug likely out of her system by that point) was 18 cells (1%).\par Had improved prior to 4/2021 but then had a new relapse. Reports static numbness since 4/2021 but burning pain is improved.\par \par Subj interval:\par \par She is currently on 30mg prednisone.\par \par Had improved prior to 4/2021 but then had a new relapse. Reports static numbness since 4/2021 but burning pain is improved.\par \par Uplizna since 12/2020, got an earlier dose in 4/2021 due to relapse and B cells at 1%.\par \par ROS/Current Sx:\par - loss of sensation from the torso down. 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 \par \par O:\par \par AO3. Normally conversant. Follows commands, names, and repeats. Good attention.\par face symm\par moves all 4 well\par gait slightly unstable but walks well without assistance\par \par 10/2020\par ESR\par b12 wnl\par TSh high but T4 wnl\par vitD 16\par HATTIE 1:320\par AQP4+\par ssa/ssb neg\par HTLV neg\par JCV+\par \par 9/2020\par copper wnl\par ACE wnl\par dsdna neg\par RF wnl\par HATTIE 1:640\par \par CSF 9/2020\par glucose nl\par prot 44\par absent OCBs\par igg index/synth wnl\par WNV neg\par WBC 26\par VDRL neg\par lyme neg\par AQP4+\par \par MRI brain 9/2020 - unrevealing\par \par MRI C+T 9/2020 - longitudinally extensive myelitis\par \par \par AP: NMO (LETM in 9/2020, apq4 + in both serum and CSF), hx of MG as well, +HATTIE. Started uplizna in 12/2020. New relapse on uplizna in 4/2021 but with Bcells at 1% (18) suggesting either lack of depletion or early re-population.\par \par This is a complicated case. We had initiailly discussed options of switching to rituximab vs soliris vs treating earlier with uplizna. She opted for earlier treatment with uplizna, with very close monitoring of B cell count. I advised that I believe this is a reasonable course of action.\par \par Answered all questions, education provided re: dx, prognosis, management. management discussed at length.\par \par - cont uplizna, would typically plan for q6m but might need earlier dosing if early-repletion vs switch to soliris or rituximab\par - cont prednisone taper to goal of 10mg, stay on 10mg until confirmation of B cell suppression\par - check CD20 count in 1 month, then monthly\par - vitD 4ku daily for vitD def\par - pt agreed to be contacted re: NENMOC*\par - counseled in the past on planned pregnancy - advised contraception when within 3m of uplizna.\par - RTC 3-4wks after CD20 result.

## 2021-05-14 ENCOUNTER — NON-APPOINTMENT (OUTPATIENT)
Age: 41
End: 2021-05-14

## 2021-05-23 ENCOUNTER — LABORATORY RESULT (OUTPATIENT)
Age: 41
End: 2021-05-23

## 2021-05-28 RX ORDER — ADHESIVE TAPE 3"X 2.3 YD
50 MCG TAPE, NON-MEDICATED TOPICAL DAILY
Qty: 60 | Refills: 11 | Status: ACTIVE | COMMUNITY
Start: 2021-01-25 | End: 1900-01-01

## 2021-06-03 ENCOUNTER — APPOINTMENT (OUTPATIENT)
Dept: NEUROLOGY | Facility: CLINIC | Age: 41
End: 2021-06-03

## 2021-06-08 NOTE — ED CDU PROVIDER DISPOSITION NOTE - PRINCIPAL DIAGNOSIS
Addended by: KATHI NICHOLS on: 6/8/2021 01:10 PM     Modules accepted: Orders     Numbness of both lower extremities

## 2021-06-30 ENCOUNTER — LABORATORY RESULT (OUTPATIENT)
Age: 41
End: 2021-06-30

## 2021-07-22 LAB
25(OH)D3 SERPL-MCNC: 35.1 NG/ML
ALBUMIN SERPL ELPH-MCNC: 4.7 G/DL
ALP BLD-CCNC: 44 U/L
ALT SERPL-CCNC: 20 U/L
AST SERPL-CCNC: 16 U/L
BASOPHILS # BLD AUTO: 0.03 K/UL
BASOPHILS NFR BLD AUTO: 0.4 %
BILIRUB DIRECT SERPL-MCNC: 0.1 MG/DL
BILIRUB INDIRECT SERPL-MCNC: 0.3 MG/DL
BILIRUB SERPL-MCNC: 0.4 MG/DL
EOSINOPHIL # BLD AUTO: 0.11 K/UL
EOSINOPHIL NFR BLD AUTO: 1.4 %
HCT VFR BLD CALC: 40.8 %
HGB BLD-MCNC: 12.6 G/DL
IMM GRANULOCYTES NFR BLD AUTO: 0.3 %
LYMPHOCYTES # BLD AUTO: 1.03 K/UL
LYMPHOCYTES NFR BLD AUTO: 13.2 %
MAN DIFF?: NORMAL
MCHC RBC-ENTMCNC: 26.6 PG
MCHC RBC-ENTMCNC: 30.9 GM/DL
MCV RBC AUTO: 86.3 FL
MONOCYTES # BLD AUTO: 0.58 K/UL
MONOCYTES NFR BLD AUTO: 7.4 %
NEUTROPHILS # BLD AUTO: 6.06 K/UL
NEUTROPHILS NFR BLD AUTO: 77.3 %
PLATELET # BLD AUTO: 195 K/UL
PROT SERPL-MCNC: 7.1 G/DL
RBC # BLD: 4.73 M/UL
RBC # FLD: 13.3 %
WBC # FLD AUTO: 7.83 K/UL

## 2021-07-23 ENCOUNTER — TRANSCRIPTION ENCOUNTER (OUTPATIENT)
Age: 41
End: 2021-07-23

## 2021-07-23 ENCOUNTER — INPATIENT (INPATIENT)
Facility: HOSPITAL | Age: 41
LOS: 1 days | Discharge: HOME | DRG: 60 | End: 2021-07-25
Attending: PSYCHIATRY & NEUROLOGY | Admitting: PSYCHIATRY & NEUROLOGY
Payer: COMMERCIAL

## 2021-07-23 ENCOUNTER — NON-APPOINTMENT (OUTPATIENT)
Age: 41
End: 2021-07-23

## 2021-07-23 VITALS
OXYGEN SATURATION: 99 % | TEMPERATURE: 98 F | DIASTOLIC BLOOD PRESSURE: 94 MMHG | WEIGHT: 160.06 LBS | SYSTOLIC BLOOD PRESSURE: 127 MMHG | HEART RATE: 86 BPM | RESPIRATION RATE: 17 BRPM | HEIGHT: 62 IN

## 2021-07-23 DIAGNOSIS — G70.00 MYASTHENIA GRAVIS WITHOUT (ACUTE) EXACERBATION: Chronic | ICD-10-CM

## 2021-07-23 DIAGNOSIS — Z90.49 ACQUIRED ABSENCE OF OTHER SPECIFIED PARTS OF DIGESTIVE TRACT: Chronic | ICD-10-CM

## 2021-07-23 DIAGNOSIS — G36.0 NEUROMYELITIS OPTICA [DEVIC]: ICD-10-CM

## 2021-07-23 LAB
ALBUMIN SERPL ELPH-MCNC: 4.4 G/DL — SIGNIFICANT CHANGE UP (ref 3.3–5)
ALP SERPL-CCNC: 39 U/L — LOW (ref 40–120)
ALT FLD-CCNC: 21 U/L — SIGNIFICANT CHANGE UP (ref 10–45)
ANION GAP SERPL CALC-SCNC: 16 MMOL/L — SIGNIFICANT CHANGE UP (ref 5–17)
AST SERPL-CCNC: 16 U/L — SIGNIFICANT CHANGE UP (ref 10–40)
BASOPHILS # BLD AUTO: 0.04 K/UL — SIGNIFICANT CHANGE UP (ref 0–0.2)
BASOPHILS NFR BLD AUTO: 0.4 % — SIGNIFICANT CHANGE UP (ref 0–2)
BILIRUB SERPL-MCNC: 0.3 MG/DL — SIGNIFICANT CHANGE UP (ref 0.2–1.2)
BUN SERPL-MCNC: 10 MG/DL — SIGNIFICANT CHANGE UP (ref 7–23)
CALCIUM SERPL-MCNC: 9.1 MG/DL — SIGNIFICANT CHANGE UP (ref 8.4–10.5)
CHLORIDE SERPL-SCNC: 104 MMOL/L — SIGNIFICANT CHANGE UP (ref 96–108)
CO2 SERPL-SCNC: 21 MMOL/L — LOW (ref 22–31)
CREAT SERPL-MCNC: 0.46 MG/DL — LOW (ref 0.5–1.3)
DEPRECATED KAPPA LC FREE/LAMBDA SER: 0.99 RATIO
EOSINOPHIL # BLD AUTO: 0.15 K/UL — SIGNIFICANT CHANGE UP (ref 0–0.5)
EOSINOPHIL NFR BLD AUTO: 1.6 % — SIGNIFICANT CHANGE UP (ref 0–6)
GLUCOSE BLDC GLUCOMTR-MCNC: 183 MG/DL — HIGH (ref 70–99)
GLUCOSE BLDC GLUCOMTR-MCNC: 233 MG/DL — HIGH (ref 70–99)
GLUCOSE SERPL-MCNC: 114 MG/DL — HIGH (ref 70–99)
HCT VFR BLD CALC: 38.9 % — SIGNIFICANT CHANGE UP (ref 34.5–45)
HGB BLD-MCNC: 12.9 G/DL — SIGNIFICANT CHANGE UP (ref 11.5–15.5)
IGA SER QL IEP: 131 MG/DL
IGG SER QL IEP: 1208 MG/DL
IGM SER QL IEP: 124 MG/DL
IMM GRANULOCYTES NFR BLD AUTO: 0.4 % — SIGNIFICANT CHANGE UP (ref 0–1.5)
KAPPA LC CSF-MCNC: 1.34 MG/DL
KAPPA LC SERPL-MCNC: 1.33 MG/DL
LYMPHOCYTES # BLD AUTO: 1.24 K/UL — SIGNIFICANT CHANGE UP (ref 1–3.3)
LYMPHOCYTES # BLD AUTO: 13.1 % — SIGNIFICANT CHANGE UP (ref 13–44)
MAGNESIUM SERPL-MCNC: 1.9 MG/DL — SIGNIFICANT CHANGE UP (ref 1.6–2.6)
MCHC RBC-ENTMCNC: 27.4 PG — SIGNIFICANT CHANGE UP (ref 27–34)
MCHC RBC-ENTMCNC: 33.2 GM/DL — SIGNIFICANT CHANGE UP (ref 32–36)
MCV RBC AUTO: 82.6 FL — SIGNIFICANT CHANGE UP (ref 80–100)
MONOCYTES # BLD AUTO: 0.84 K/UL — SIGNIFICANT CHANGE UP (ref 0–0.9)
MONOCYTES NFR BLD AUTO: 8.9 % — SIGNIFICANT CHANGE UP (ref 2–14)
NEUTROPHILS # BLD AUTO: 7.14 K/UL — SIGNIFICANT CHANGE UP (ref 1.8–7.4)
NEUTROPHILS NFR BLD AUTO: 75.6 % — SIGNIFICANT CHANGE UP (ref 43–77)
NRBC # BLD: 0 /100 WBCS — SIGNIFICANT CHANGE UP (ref 0–0)
PHOSPHATE SERPL-MCNC: 4.7 MG/DL — HIGH (ref 2.5–4.5)
PLATELET # BLD AUTO: 184 K/UL — SIGNIFICANT CHANGE UP (ref 150–400)
POTASSIUM SERPL-MCNC: 3.6 MMOL/L — SIGNIFICANT CHANGE UP (ref 3.5–5.3)
POTASSIUM SERPL-SCNC: 3.6 MMOL/L — SIGNIFICANT CHANGE UP (ref 3.5–5.3)
PROT SERPL-MCNC: 7 G/DL — SIGNIFICANT CHANGE UP (ref 6–8.3)
RBC # BLD: 4.71 M/UL — SIGNIFICANT CHANGE UP (ref 3.8–5.2)
RBC # FLD: 13.3 % — SIGNIFICANT CHANGE UP (ref 10.3–14.5)
SARS-COV-2 RNA SPEC QL NAA+PROBE: SIGNIFICANT CHANGE UP
SODIUM SERPL-SCNC: 141 MMOL/L — SIGNIFICANT CHANGE UP (ref 135–145)
WBC # BLD: 9.45 K/UL — SIGNIFICANT CHANGE UP (ref 3.8–10.5)
WBC # FLD AUTO: 9.45 K/UL — SIGNIFICANT CHANGE UP (ref 3.8–10.5)

## 2021-07-23 PROCEDURE — 99285 EMERGENCY DEPT VISIT HI MDM: CPT

## 2021-07-23 PROCEDURE — 99223 1ST HOSP IP/OBS HIGH 75: CPT | Mod: GC

## 2021-07-23 RX ORDER — LEVOTHYROXINE SODIUM 125 MCG
25 TABLET ORAL DAILY
Refills: 0 | Status: DISCONTINUED | OUTPATIENT
Start: 2021-07-23 | End: 2021-07-25

## 2021-07-23 RX ORDER — PANTOPRAZOLE SODIUM 20 MG/1
40 TABLET, DELAYED RELEASE ORAL
Refills: 0 | Status: DISCONTINUED | OUTPATIENT
Start: 2021-07-23 | End: 2021-07-25

## 2021-07-23 RX ORDER — ENOXAPARIN SODIUM 100 MG/ML
40 INJECTION SUBCUTANEOUS DAILY
Refills: 0 | Status: DISCONTINUED | OUTPATIENT
Start: 2021-07-23 | End: 2021-07-25

## 2021-07-23 RX ORDER — ACETAMINOPHEN 500 MG
650 TABLET ORAL ONCE
Refills: 0 | Status: COMPLETED | OUTPATIENT
Start: 2021-07-23 | End: 2021-07-23

## 2021-07-23 RX ORDER — CHOLECALCIFEROL (VITAMIN D3) 125 MCG
4000 CAPSULE ORAL DAILY
Refills: 0 | Status: DISCONTINUED | OUTPATIENT
Start: 2021-07-23 | End: 2021-07-25

## 2021-07-23 RX ADMIN — Medication 116 MILLIGRAM(S): at 04:22

## 2021-07-23 RX ADMIN — Medication 650 MILLIGRAM(S): at 05:06

## 2021-07-23 RX ADMIN — ENOXAPARIN SODIUM 40 MILLIGRAM(S): 100 INJECTION SUBCUTANEOUS at 17:51

## 2021-07-23 RX ADMIN — Medication 25 MICROGRAM(S): at 21:31

## 2021-07-23 RX ADMIN — PANTOPRAZOLE SODIUM 40 MILLIGRAM(S): 20 TABLET, DELAYED RELEASE ORAL at 17:51

## 2021-07-23 RX ADMIN — Medication 1000 MILLIGRAM(S): at 04:52

## 2021-07-23 RX ADMIN — Medication 4000 UNIT(S): at 22:32

## 2021-07-23 RX ADMIN — Medication 58 MILLIGRAM(S): at 17:49

## 2021-07-23 NOTE — DISCHARGE NOTE PROVIDER - CARE PROVIDER_API CALL
Jennifer Resendiz)  Neurology  [address] [place] [address]  Phone: [phone]  Fax: [phone]  Established Patient  Follow Up Time: 2 weeks

## 2021-07-23 NOTE — DISCHARGE NOTE PROVIDER - NSDCMRMEDTOKEN_GEN_ALL_CORE_FT
Synthroid 25 mcg (0.025 mg) oral tablet: 1 tab(s) orally once a day in the morning before breakfast   predniSONE 10 mg oral tablet: Take 10 tabs 7/25, 9 tabs 7/26, 8 tabs 7/27, 7 tabs 7/28, 6 tabs 7/29, 5 tabs 7/30, 4 tabs 7/31, 3 tabs 8/1, 2 tabs 8/2, 1 tab 8/3   Synthroid 25 mcg (0.025 mg) oral tablet: 1 tab(s) orally once a day in the morning before breakfast

## 2021-07-23 NOTE — ED ADULT NURSE REASSESSMENT NOTE - NS ED NURSE REASSESS COMMENT FT1
MD Soler at bedside to update pt on plan of care. Admitted to neuro. Bed locked and lowered. Comfort and safety measures maintained.

## 2021-07-23 NOTE — CONSULT NOTE ADULT - SUBJECTIVE AND OBJECTIVE BOX
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 Pt reports gradual onset and progression, now worsening. Pt also reports chronic b/l LE numbness but no difficulty ambulating, no vision changes. Of note, pt has had a recent admission in 4/2021 with similar symptoms of burning pain in mid thoracic back pain with no changes in sensation, resolved with 5 days of IV steroids. Pt denies any CP, dysuria, +nausea, no vomiting. Pt follows with Dr. Jennifer Resendiz, last seen in 5/2021.     REVIEW OF SYSTEMS    A 10-system ROS was performed and is negative except for those items noted above and/or in the HPI.    PAST MEDICAL & SURGICAL HISTORY:  Hypothyroid    Anxiety    Myasthenia gravis    Neuromyelitis optica    MG status post thymectomy (myasthenia gravis)  2005    History of appendectomy  2001      FAMILY HISTORY:  No pertinent family history in first degree relatives      SOCIAL HISTORY:   T/E/D:   Occupation:   Lives with:     MEDICATIONS (HOME):  Home Medications:  Thera-D Rapid Repletion oral tablet: 4000 unit(s) orally once a day (09 Apr 2021 08:59)    MEDICATIONS  (STANDING):  methylPREDNISolone sodium succinate IVPB 1000 milliGRAM(s) IV Intermittent Once    MEDICATIONS  (PRN):    ALLERGIES/INTOLERANCES:  Allergies  No Known Allergies    Intolerances    VITALS & EXAMINATION:  Vital Signs Last 24 Hrs  T(C): 36.7 (23 Jul 2021 01:33), Max: 36.7 (23 Jul 2021 01:33)  T(F): 98.1 (23 Jul 2021 01:33), Max: 98.1 (23 Jul 2021 01:33)  HR: 86 (23 Jul 2021 01:33) (86 - 86)  BP: 127/94 (23 Jul 2021 01:33) (127/94 - 127/94)  BP(mean): --  RR: 17 (23 Jul 2021 01:33) (17 - 17)  SpO2: 99% (23 Jul 2021 01:33) (99% - 99%)    General:  Constitutional: Obese Female, appears stated age, in significant distress and pain, crying  Head: Normocephalic & atraumatic.  Respiratory: No increased work of breathing  Extremities: No cyanosis, clubbing, or edema.  Skin: No rashes, bruising, or discoloration.    Neurological (>12):  MS: Awake, alert, oriented to person, place, situation, time. Follows all commands.    Language: Speech is clear, fluent with good repetition & comprehension (able to name objects thumb, eyebrow)    CNs: PERRL (R = 3mm, L = 3mm). VFF. EOMI no nystagmus, no diplopia. V1-3 intact to LT, well developed masseter muscles b/l. No facial asymmetry b/l, full eye closure strength b/l. Hearing grossly normal (rubbing fingers) b/l. Symmetric palate elevation in midline. Gag reflex deferred. Head turning & shoulder shrug intact b/l. Tongue midline, normal movements, no atrophy.    Motor: No tremors.               Deltoid	Biceps	Triceps	Wrist	   R	5	5	5	5	5		  L	5	5	5	5	5	    	H-Flex	K-Flex	K-Ext	D-Flex	P-Flex  R	5	5	5	5	5		   L	5	5	5	5	5		     Sensation: Pt reports decreased sensation over b/l LE (chronic)    Reflexes:              Biceps(C5)       BR(C6)     Triceps(C7)               Patellar(L4)    Achilles(S1)    Plantar Resp  R	2	          2	             2		        3		    2		Down   L	2	          2	             2		        3		    2		Down     Coordination: No dysmetria to FTN    Gait: deferred    LABORATORY:  CBC                       12.9   9.45  )-----------( 184      ( 23 Jul 2021 03:38 )             38.9       STUDIES & IMAGING:  Studies (EKG, EEG, EMG, etc):     Radiology (XR, CT, MR, U/S, TTE/KEIRA):    < from: MR Thoracic Spine w/wo IV Cont (04.07.21 @ 17:15) >  IMPRESSION: Abnormal cord signal T2 to T8-9 with abnormal enhancement along the dorsal left portion of the cord T4-5 to T5-6 which appears slightly different when compared to the prior exam of 9/25/2020 which may be due to a separate episode of demyelination versus residual demyelination/inflammatory change.    < end of copied text >

## 2021-07-23 NOTE — CONSULT NOTE ADULT - ATTENDING COMMENTS
NEUROLOGY ATTENDING:  JAMAAL [phone]  	  CC: NEUROMYELITIS OPTICA, RECURRENT LE MYELITIS.    PATIENT SEEN & EXAMINED WITH JANICETAWILLY ON 7/23/2021  [facility]   [facility] RECOR[facility] REVIEWED  LABS REVIEWED  IMAGING REVIEWED    41 yo RH woman with PMHx neuromyelitis optica, hypothyroidism, and a diagnosis of myasthenia gravis, who presents to ED with complaints of severe left flank burning pain, c/w prior NMO attacks.  Her NMO was diagnosed in SEPTEMBER 2020, with workup demonstrating longitudinally extensive T2 FLAIR hyperintensity in spinal cord as well as enhancement at the lower aspect of a large region. CSF 9/26/2020 showed no oligoclonal bands, WBC=26; RBC=0; protein=44; and glucose=64. HATTIE showed 1:640 speckled pattern and NMO antibodies were found in the CSF. No anti-dsDNA AB was seen.  She has been managed under the expert guidance of Dr Jennifer Resendiz  Her Myasthenia Gravis diagnosis was made by Dr Jones’s team in 2004 or 2005, when she developed “triple” vision. She was treated with prednisone, mestinon, and underwent a thymectomy. She reports improving with the prednisone and the thymectomy. Although she took the mestinon several times daily, she does not remember if that helped.   On 1/13/2021, she had COVID infection.  She has had difficulty urinating and several UTIs, including a recent UTI.  She denies headache, nausea/vomiting, visual changes or any other signs of increased ICP. No seizure-like symptomatology. There are no other cranial nerve complaints: no double-vision, no blurry vision, no facial asymmetry, no trouble swallowing, no hearing loss.       EXAMINATION  CONSTITUTIONAL	Vitals as documented in Nursing section of [place] chart		  	Healthy appearing, well-groomed, and without deformities.  MENTAL STATUS	Awake and oriented to person, place and date/time  	Normal attention span & concentration   	Speech – Intact naming, with fluent speech expression and intact comprehension.  	Recent and remote memory intact.  	Normal fund of knowledge, including current events; Provides full history.  CRANIAL NERVES	II: Visual fields are normal without quadrantanopsia.   	III, IV, and VI: PERRLA. EOMs are normal.  	V: Facial sensation is normal bilaterally  	VII: Facial strength is normal bilaterally.  	VIII: Hearing is intact.  	IX, X: Palate is midline and gag intact.  	XI: SCM and trapezius have normal strength bilaterally.  	XII: Tongue is midline and there is no atrophy or fasciculations.  MOTOR	Muscle tone: no evidence of rigidity or resistance. No drift. Muscle strength: arms and legs, proximal and distal, flexors and extensors are normal. No atrophy or fasciculations.  SENSATION	Normal in arms, legs, and trunk.  COORDINATION 	Rapid alternating movements are normal in the upper and lower extremities. Finger/nose & heel/knee/shin are normal bilaterally.  REFLEXES	All present and normal at biceps, triceps, and brachioradialis bilaterally. Knees and ankle jerks are normal bilaterally as well. Toes are UPgoing.  CARDIOVASCULAR	No peripheral edema.      IMAGING  I personally and independently reviewed available CT imaging, for the following interpretation: The NEUROimaging reviewed is dated  4/7/2021			    In reviewing these images, I find extensive LONGITUDINAL HYPERINTENSITY on the T2 weighted images, FROM T2 THROUGH T8, with signal change likely representing inflammation as the cord is expanded and there is enhancement at the T67 level.     IMPRESSION/PLAN  MYASTHENIA GRAVIS – Inactive at this time.    NEUROMYELITIS OPTICA – Will continue with solumedrol 1000mg daily for a total of three days, followed by an oral taper. Will check CD19 levels to determine if additional B-cell depleting therapies are warranted. She will follow-up with Dr Resendiz regarding next steps.    TRIPLE VISION – May represent cranial disease. Will pursue MRI orbits and brain with contrast as the only MR imaging of the brain available is dated 9/25/2020.    TOTAL TIME SPENT WAS 34 MINUTES, OF WHICH HALF IN COUNSELLING AND COORDINATION OF CARE.

## 2021-07-23 NOTE — ED ADULT NURSE REASSESSMENT NOTE - NS ED NURSE REASSESS COMMENT FT1
RN attempt to speak and assess. Rn attempt to place IV. IV placed, and blood work obtained. Upon flushing IV into place, pt states "you don't know how to do your job", "I have been here three times and it never hurt like this". IV removed. When IV removed, pt states "I didn't tell you to take it out". Pt became extremely agitated at RN and RN unable to assess pt.

## 2021-07-23 NOTE — DISCHARGE NOTE PROVIDER - NSDCCPCAREPLAN_GEN_ALL_CORE_FT
PRINCIPAL DISCHARGE DIAGNOSIS  Diagnosis: Neuromyelitis optica  Assessment and Plan of Treatment:        PRINCIPAL DISCHARGE DIAGNOSIS  Diagnosis: Neuromyelitis optica  Assessment and Plan of Treatment: Follow up with your Primary Care Physician within the next 1-2 weeks.   Follow up with your Neurologist Dr. Resendiz within the next 1-2 weeks.  Follow up with your previously scheduled appointments.   Bring a copy of your test results/discharge documents with you to your appointments.  Continue your medications as prescribed.  You were started on the following medication:   Steroid taper  Monitor your blood pressure. Reduce fat, cholesterol, and salt in your diet. Increase intake of fruits and vegetables. Limit alcohol to a minimum and do not smoke. You may be at risk for falling, make changes to your home to help you walk easier. Keep up to date on vaccinations.  Contact your Neurologist, Primary Care Provider, or report to the Emergency Room if you experience new or worsening symptoms including sudden severe headache, new numbness/tingling, new weakness, and difficulty speaking.

## 2021-07-23 NOTE — ED ADULT NURSE NOTE - NSIMPLEMENTINTERV_GEN_ALL_ED
Implemented All Fall Risk Interventions:  [place] to call system. Call bell, personal items and telephone within reach. Instruct patient to call for assistance. Room bathroom lighting operational. Non-slip footwear when patient is off stretcher. Physically safe environment: no spills, clutter or unnecessary equipment. Stretcher in lowest position, wheels locked, appropriate side rails in place. Provide visual cue, wrist band, yellow gown, etc. Monitor gait and stability. Monitor for mental status changes and reorient to person, place, and time. Review medications for side effects contributing to fall risk. Reinforce activity limits and safety measures with patient and family.

## 2021-07-23 NOTE — ED PROVIDER NOTE - ATTENDING CONTRIBUTION TO CARE
Pt with known NMO and MG presents with 2d of low back pain radiating to abdomen with feelings of weakness generalized lower legs.  +agitation and anxiety.  Pt with very labile mood, anxious, agitated at times, able to ambulate.

## 2021-07-23 NOTE — ED PROVIDER NOTE - PROGRESS NOTE DETAILS
Dr. Sloan Note: patient very frustrated, tried and failed initially to establish report with a joke, apologized and offered reassurance.  Neuro consulted, awaiting recc.  Multiple IV attempts not successful initially.  Pt verbally aggressive with nursing staff, pt likely frustrated given her discomfort plus her medical problems may contribute.  Will proceed with solumedrol 1g in attempt to relieve pt's symptoms (pt refusing any other medications po or IV) while awaiting neuro recc.

## 2021-07-23 NOTE — H&P ADULT - ASSESSMENT
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 Pt reports gradual onset and progression, now worsening. Pt also reports chronic b/l LE. Of note, pt has had a recent admission in 4/2021 with similar symptoms of burning pain in mid thoracic back pain with no changes in sensation, resolved with 5 days of IV steroids. Pt denies any CP, dysuria, +nausea, no vomiting. Pt follows with Dr. Jennifer Resendiz, last seen in 5/2021.     Impression: sharp burning pain likely 2/2 new demyelination vs. residual demyelination/inflammatory change at T4-T6 segments, possibly flare of known NMO    Plan:  [] admit to general neurology service under Dr. Young  [] can try IV tylenol, ketorlac, or tramadol PRN for pain control  [] start IV solumedrol 1g, likely will need 3-5 days  [] MRI C/T spine w/w/o contrast  [] neurology follow up outpatient with Dr. Resendiz upon discharge    Case to be discussed with attending.

## 2021-07-23 NOTE — ED PROVIDER NOTE - NS ED ROS FT
GENERAL: No fever or chills  EYES: No change in vision  HEENT: No trouble swallowing or speaking  CARDIAC: No chest pain  PULMONARY: No cough or SOB  GI: No abdominal pain, no nausea or no vomiting, no diarrhea or constipation  : No changes in urination  SKIN: No rashes  NEURO: No headache, + B/L lower extremity numbness consistent with baseline  MSK: No joint pain  Otherwise as HPI or negative.

## 2021-07-23 NOTE — ED ADULT NURSE NOTE - OBJECTIVE STATEMENT
40 year old female presents to ED via walk-in complaining of multiple medical complaints. Upon RN intervention pt become very agitated, and RN unable to complete assessment. Pt sitting at edge of bed. Sister at bedside. Bed locked and lowered. Comfort and safety measures maintained.

## 2021-07-23 NOTE — ED ADULT NURSE NOTE - CHIEF COMPLAINT QUOTE
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

## 2021-07-23 NOTE — DISCHARGE NOTE PROVIDER - HOSPITAL COURSE
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 Pt reports gradual onset and progression, now worsening. Pt also reports chronic b/l LE. Of note, pt has had a recent admission in 4/2021 with similar symptoms of burning pain in mid thoracic back pain with no changes in sensation, resolved with 5 days of IV steroids. Pt denies any CP, dysuria, +nausea, no vomiting. Pt follows with Dr. Jennifer Resendiz, last seen in 5/2021.     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 Pt reports gradual onset and progression, now worsening. Pt also reports chronic b/l LE. Of note, pt has had a recent admission in 4/2021 with similar symptoms of burning pain in mid thoracic back pain with no changes in sensation, resolved with 5 days of IV steroids. Pt denies any CP, dysuria, +nausea, no vomiting. Pt follows with Dr. Jennifer Resendiz, last seen in 5/2021.     Hospital Course:   Started Solumedrol 1000mg q12hrs on 7/23.     Medications:  Solumedrol 1000mg    Laboratory:   Serum CD19 level    Consults:    Imaging:  MR C and T spine    Impression:  Acute sharp, stabbing, burning pain in back and side of abdomen for 3-4 days, concerning for NMO flare.     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 Pt reports gradual onset and progression, now worsening. Pt also reports chronic b/l LE. Of note, pt has had a recent admission in 4/2021 with similar symptoms of burning pain in mid thoracic back pain with no changes in sensation, resolved with 5 days of IV steroids. Pt denies any CP, dysuria, +nausea, no vomiting. Pt follows with Dr. Jennifer Resendiz, last seen in 5/2021.     Hospital Course:   Admitted on 7/23 and started Solumedrol 1000mg q12hrs x3 doses. Patient requesting repeat MR spine imaging.     Medications:  Solumedrol 1000mg IV q12 x3 doses. Will be discharged on steroid taper.     Laboratory:   Serum CD19 level sent, can follow up outpatient.     Imaging:  MR C and T spine    Impression:  Acute sharp, stabbing, burning pain in back and side of abdomen for 3-4 days, concerning for NMO flare.     Disposition:   Patient is neurologically stable and cleared to be discharged home. 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 Pt reports gradual onset and progression, now worsening. Pt also reports chronic b/l LE. Of note, pt has had a recent admission in 4/2021 with similar symptoms of burning pain in mid thoracic back pain with no changes in sensation, resolved with 5 days of IV steroids. Pt denies any CP, dysuria, +nausea, no vomiting. Pt follows with Dr. Jennifer Resendiz, last seen in 5/2021.     Hospital Course:   Admitted on 7/23 and started Solumedrol 1000mg q12hrs x3 doses. MRI C and T spine were completed.     Medications:  Solumedrol 1000mg IV q12 x3 doses. Will be discharged on steroid taper.     Laboratory:   Serum CD19 level sent, can follow up outpatient.     Imaging:  MR C and T spine    Impression:  Acute sharp, stabbing, burning pain in back and side of abdomen for 3-4 days, concerning for NMO flare.     Disposition:   Patient is neurologically stable and cleared to be discharged home.

## 2021-07-23 NOTE — ED ADULT NURSE REASSESSMENT NOTE - NS ED NURSE REASSESS COMMENT FT1
Pt attempting to ambulate with assistance from sister. RN states she should not be walking without someone to help her due to fall risk. Pt refuses to stay in bed and states "I am just taking steps".

## 2021-07-23 NOTE — H&P ADULT - NSHPLABSRESULTS_GEN_ALL_CORE
< from: MR Cervical Spine w/wo IV Cont (04.07.21 @ 17:15) >    IMPRESSION: Abnormal cord signal T2 to T8-9 with abnormal enhancement along the dorsal left portion of the cord T4-5 to T5-6 which appears slightly different when compared to the prior exam of 9/25/2020 which may be due to a separate episode of demyelination versus residual demyelination/inflammatory change.    < end of copied text >

## 2021-07-23 NOTE — ED PROVIDER NOTE - OBJECTIVE STATEMENT
41yo woman with pmhx of myasthenia gravis (s/p thymectomy), NMO (on inebilizumab), and hypothyroidism who presented on 4/7 due to sharp, stabbing, and burning pain radiating from her back to her abdomen for the past several days, 9/20 for change in sensation on B/L LE with MRI demonstrating diffuse spinal abnormalities and _ NMO in serum and CSF. Presenting with similar complaints, diffuse pain across back in similar distribution no changes in sensation/weakness. Denies any CP, dysuria, +nausea, no vomiting

## 2021-07-23 NOTE — CONSULT NOTE ADULT - ASSESSMENT
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 Pt reports gradual onset and progression, now worsening. Pt also reports chronic b/l LE. Of note, pt has had a recent admission in 4/2021 with similar symptoms of burning pain in mid thoracic back pain with no changes in sensation, resolved with 5 days of IV steroids. Pt denies any CP, dysuria, +nausea, no vomiting. Pt follows with Dr. Jennifer Resendiz, last seen in 5/2021.     Impression: sharp burning pain likely 2/2 new demyelination vs. residual demyelination/inflammatory change at T4-T6 segments, possibly flare of known NMO    Plan:  [] admit to general neurology service under Dr. Young  [] can try IV tylenol, ketorlac, or tramadol PRN for pain control  [] start IV solumedrol 1g, likely will need 3-5 days  [] MRI C/T spine w/w/o contrast  [] neurology follow up outpatient with Dr. Resendiz upon discharge    Case to be discussed with attending.   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 Pt reports gradual onset and progression, now worsening. Pt also reports chronic b/l LE. Of note, pt has had a recent admission in 4/2021 with similar symptoms of burning pain in mid thoracic back pain with no changes in sensation, resolved with 5 days of IV steroids. Pt denies any CP, dysuria, +nausea, no vomiting. Pt follows with Dr. Jennifer Resendiz, last seen in 5/2021.     Impression: sharp burning pain likely 2/2 new demyelination vs. residual demyelination/inflammatory change at T4-T6 segments, possibly flare of known NMO    Plan:  [] admit to general neurology service under Dr. Young  [] can try IV tylenol, ketorlac, or tramadol PRN for pain control  [] start IV solumedrol 1g, likely will need 3-5 days  [] check CD19 levels and trend as NMO marker / improvement with steroids   [] check serum NMO ab levels  [] collateral / records on testing for MG diagnosis   [] neurology follow up outpatient with Dr. Resendiz upon discharge    Case to be discussed with attending.

## 2021-07-23 NOTE — ED PROVIDER NOTE - CLINICAL SUMMARY MEDICAL DECISION MAKING FREE TEXT BOX
Dr. Sloan Note: likely NMO flare, similar to previous one in April, 2d, no red flags otherwise, consider steroids, neuro consult, labs and metabolic check

## 2021-07-23 NOTE — ED ADULT NURSE REASSESSMENT NOTE - NS ED NURSE REASSESS COMMENT FT1
RN attempted to place an IV. Explained to patient the importance of PIV access in the Emergency department. Requested patient sit as still as possible but when catheter advanced, pt swung arm and turned. RN was unable to advance catheter further and explained said situation to patient. Pt stated she did not understand why it wasn't working and that this has "never happened to her before" and became increasingly agitated. Pt then again asked why she needed this and when RN explained, pt called RN a "b****." MD Sloan at bedside, patient asking "can the  put in the IV." Pt continuously making rude remarks as RN walks by, pointing fingers, and raising voice. MD and team aware.

## 2021-07-23 NOTE — H&P ADULT - HISTORY OF PRESENT ILLNESS
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 Pt reports gradual onset and progression, now worsening. Pt also reports chronic b/l LE. Of note, pt has had a recent admission in 4/2021 with similar symptoms of burning pain in mid thoracic back pain with no changes in sensation, resolved with 5 days of IV steroids. Pt denies any CP, dysuria, +nausea, no vomiting. Pt follows with Dr. Jennifer Resendiz, last seen in 5/2021.

## 2021-07-23 NOTE — H&P ADULT - NSHPPHYSICALEXAM_GEN_ALL_CORE
Motor: No tremors.               Deltoid	Biceps	Triceps	Wrist	   R	5	5	5	5	5		  L	5	5	5	5	5	    	H-Flex	K-Flex	K-Ext	D-Flex	P-Flex  R	5	5	5	5	5		   L	5	5	5	5	5		     Sensation: Pt reports decreased sensation over b/l LE (chronic) to light touch and pinprick    Reflexes:              Biceps(C5)       BR(C6)     Triceps(C7)               Patellar(L4)    Achilles(S1)    Plantar Resp  R	2	          2	             2		        3		    2		Down   L	2	          2	             2		        3		    2		Down     Coordination: No dysmetria to FTN  Gait deferred

## 2021-07-23 NOTE — ED PROVIDER NOTE - PHYSICAL EXAMINATION
Goal Outcome Evaluation:  Plan of Care Reviewed With: patient  Progress: no change  Outcome Summary: Pt pleasant and cooperative durng shift. Pt reports no relief of pain and malaise from ordered medication. Pt eductated on medications used and non pharmacological relief methods. No acute events noted during shift. VSS.   Physical Exam:  General: NAD, Conversive  Eyes: EOMI, Conjunctiva and sclera clear  Neck: No JVD  Lungs: Clear to auscultation bilaterally, no wheeze, no rhonchi  Heart: Normal S1, S2, no murmurs  Abdomen: Soft, nontender, nondistended, no CVA tenderness  Extremities: 2+ peripheral pulses, no edema, + hyperreflexive B/L knee extensors  Psych: AAO X3  Neurologic: Non-focal

## 2021-07-24 LAB
4/8 RATIO: 1.57 RATIO — SIGNIFICANT CHANGE UP (ref 0.9–3.6)
ABS CD8: 124 /UL — LOW (ref 142–740)
CD16+CD56+ CELLS NFR BLD: 33 % — HIGH (ref 5–23)
CD16+CD56+ CELLS NFR SPEC: 179 /UL — SIGNIFICANT CHANGE UP (ref 71–410)
CD19 BLASTS SPEC-ACNC: 1 /UL — LOW (ref 84–469)
CD19 BLASTS SPEC-ACNC: <1 % — LOW (ref 6–24)
CD3 BLASTS SPEC-ACNC: 350 /UL — LOW (ref 672–1870)
CD3 BLASTS SPEC-ACNC: 66 % — SIGNIFICANT CHANGE UP (ref 59–83)
CD4 %: 38 % — SIGNIFICANT CHANGE UP (ref 30–62)
CD8 %: 24 % — SIGNIFICANT CHANGE UP (ref 12–36)
COVID-19 SPIKE DOMAIN AB INTERP: POSITIVE
COVID-19 SPIKE DOMAIN ANTIBODY RESULT: 4.05 U/ML — HIGH
GLUCOSE BLDC GLUCOMTR-MCNC: 134 MG/DL — HIGH (ref 70–99)
GLUCOSE BLDC GLUCOMTR-MCNC: 144 MG/DL — HIGH (ref 70–99)
SARS-COV-2 IGG+IGM SERPL QL IA: 4.05 U/ML — HIGH
SARS-COV-2 IGG+IGM SERPL QL IA: POSITIVE
T-CELL CD4 SUBSET PNL BLD: 195 /UL — LOW (ref 489–1457)

## 2021-07-24 PROCEDURE — 99232 SBSQ HOSP IP/OBS MODERATE 35: CPT | Mod: GC

## 2021-07-24 PROCEDURE — 72156 MRI NECK SPINE W/O & W/DYE: CPT | Mod: 26

## 2021-07-24 PROCEDURE — 72157 MRI CHEST SPINE W/O & W/DYE: CPT | Mod: 26

## 2021-07-24 RX ORDER — LANOLIN ALCOHOL/MO/W.PET/CERES
3 CREAM (GRAM) TOPICAL AT BEDTIME
Refills: 0 | Status: DISCONTINUED | OUTPATIENT
Start: 2021-07-24 | End: 2021-07-25

## 2021-07-24 RX ORDER — ACETAMINOPHEN 500 MG
650 TABLET ORAL EVERY 6 HOURS
Refills: 0 | Status: DISCONTINUED | OUTPATIENT
Start: 2021-07-24 | End: 2021-07-25

## 2021-07-24 RX ADMIN — PANTOPRAZOLE SODIUM 40 MILLIGRAM(S): 20 TABLET, DELAYED RELEASE ORAL at 06:27

## 2021-07-24 RX ADMIN — Medication 3 MILLIGRAM(S): at 03:15

## 2021-07-24 RX ADMIN — Medication 25 MICROGRAM(S): at 06:26

## 2021-07-24 RX ADMIN — Medication 4000 UNIT(S): at 13:18

## 2021-07-24 RX ADMIN — Medication 58 MILLIGRAM(S): at 04:28

## 2021-07-24 RX ADMIN — ENOXAPARIN SODIUM 40 MILLIGRAM(S): 100 INJECTION SUBCUTANEOUS at 13:16

## 2021-07-24 NOTE — PROGRESS NOTE ADULT - ASSESSMENT
40F w/ MG s/p thymectomy, NMO on Inebilizumab, hypothyroidism, presented w/ sharp burning pain radiating down her back and abdomen.     Impression: Sharp burning pain possibly secondary to acute NMO exacerbation vs. pseudoexacerbation.     Plan:   [] IV Solumedrol 1g q12h; Last dose today  [] MR cervical, thoracic spine w/w/ contrast (should not hold discharge)   [] f/u with Dr. Resendiz     DVT ppx: Lovenox SQ  Diet: Reg  Disposition: Home

## 2021-07-24 NOTE — PROGRESS NOTE ADULT - SUBJECTIVE AND OBJECTIVE BOX
BALDO CAMILO  Female  MRN-[id number]    Interval:    -She took out her IV last night. Headaches and trouble sleeping, recieved tylenol and melatonin.   -s/p 2 doses of IV steroids.     VITAL SIGNS:  T(F): 97.8  HR: 88  BP: 113/74  RR: 18  SpO2: 97%    Exam:   MS: Oriented x3. Fluent. Follows crossed commands.   CN: VFF. EOMI. V1-V3 intact. Face symmetric. T/u midline.   Motor: Full strength throughout.   Sensory: Intact to LT throughout   Reflexes: 2+ throughout. Babinski absent bilaterally.   Coordination: No dysmetria on FNF or ataxia on HTS bilaterally   Gait: Deferred    LABS:                          12.9   9.45  )-----------( 184      ( 23 Jul 2021 03:38 )             38.9     07-23    141  |  104  |  10  ----------------------------<  114<H>  3.6   |  21<L>  |  0.46<L>    Ca    9.1      23 Jul 2021 03:38  Phos  4.7     07-23  Mg     1.9     07-23    TPro  7.0  /  Alb  4.4  /  TBili  0.3  /  DBili  x   /  AST  16  /  ALT  21  /  AlkPhos  39<L>  07-23        MEDICATIONS:   acetaminophen   Tablet .. 650 milliGRAM(s) Oral every 6 hours PRN  cholecalciferol 4000 Unit(s) Oral daily  enoxaparin Injectable 40 milliGRAM(s) SubCutaneous daily  levothyroxine 25 MICROGram(s) Oral daily  melatonin 3 milliGRAM(s) Oral at bedtime PRN  pantoprazole    Tablet 40 milliGRAM(s) Oral before breakfast          RADIOLOGY & ADDITIONAL STUDIES:

## 2021-07-25 ENCOUNTER — TRANSCRIPTION ENCOUNTER (OUTPATIENT)
Age: 41
End: 2021-07-25

## 2021-07-25 VITALS
TEMPERATURE: 98 F | SYSTOLIC BLOOD PRESSURE: 106 MMHG | OXYGEN SATURATION: 98 % | DIASTOLIC BLOOD PRESSURE: 68 MMHG | HEART RATE: 84 BPM | RESPIRATION RATE: 18 BRPM

## 2021-07-25 LAB
GLUCOSE BLDC GLUCOMTR-MCNC: 103 MG/DL — HIGH (ref 70–99)
GLUCOSE BLDC GLUCOMTR-MCNC: 89 MG/DL — SIGNIFICANT CHANGE UP (ref 70–99)

## 2021-07-25 PROCEDURE — 86053 AQAPRN-4 ANTB FLO CYTMTRY EA: CPT

## 2021-07-25 PROCEDURE — 86769 SARS-COV-2 COVID-19 ANTIBODY: CPT

## 2021-07-25 PROCEDURE — 72156 MRI NECK SPINE W/O & W/DYE: CPT

## 2021-07-25 PROCEDURE — 86359 T CELLS TOTAL COUNT: CPT

## 2021-07-25 PROCEDURE — 84100 ASSAY OF PHOSPHORUS: CPT

## 2021-07-25 PROCEDURE — 86042 ACETYLCHOLN RCPTR BLCKG ANTB: CPT

## 2021-07-25 PROCEDURE — 80053 COMPREHEN METABOLIC PANEL: CPT

## 2021-07-25 PROCEDURE — 99285 EMERGENCY DEPT VISIT HI MDM: CPT | Mod: 25

## 2021-07-25 PROCEDURE — 87635 SARS-COV-2 COVID-19 AMP PRB: CPT

## 2021-07-25 PROCEDURE — 86355 B CELLS TOTAL COUNT: CPT

## 2021-07-25 PROCEDURE — 96365 THER/PROPH/DIAG IV INF INIT: CPT

## 2021-07-25 PROCEDURE — 86360 T CELL ABSOLUTE COUNT/RATIO: CPT

## 2021-07-25 PROCEDURE — 72157 MRI CHEST SPINE W/O & W/DYE: CPT

## 2021-07-25 PROCEDURE — 99238 HOSP IP/OBS DSCHRG MGMT 30/<: CPT | Mod: GC

## 2021-07-25 PROCEDURE — 83735 ASSAY OF MAGNESIUM: CPT

## 2021-07-25 PROCEDURE — 86043 ACETYLCHOLN RCPTR MODLG ANTB: CPT

## 2021-07-25 PROCEDURE — 85025 COMPLETE CBC W/AUTO DIFF WBC: CPT

## 2021-07-25 PROCEDURE — 82962 GLUCOSE BLOOD TEST: CPT

## 2021-07-25 PROCEDURE — 86357 NK CELLS TOTAL COUNT: CPT

## 2021-07-25 PROCEDURE — 86041 ACETYLCHOLN RCPTR BNDNG ANTB: CPT

## 2021-07-25 RX ADMIN — PANTOPRAZOLE SODIUM 40 MILLIGRAM(S): 20 TABLET, DELAYED RELEASE ORAL at 06:05

## 2021-07-25 RX ADMIN — Medication 25 MICROGRAM(S): at 05:29

## 2021-07-25 NOTE — PROGRESS NOTE ADULT - SUBJECTIVE AND OBJECTIVE BOX
SUBJECTIVE:     INTERVAL HISTORY:    PAST MEDICAL & SURGICAL HISTORY:  Hypothyroid    Anxiety    Myasthenia gravis    Neuromyelitis optica    MG status post thymectomy (myasthenia gravis)  2005    History of appendectomy  2001      FAMILY HISTORY:  No pertinent family history in first degree relatives      SOCIAL HISTORY:   T/E/D:   Occupation:   Lives with:     MEDICATIONS (HOME):  Home Medications:    MEDICATIONS  (STANDING):  cholecalciferol 4000 Unit(s) Oral daily  enoxaparin Injectable 40 milliGRAM(s) SubCutaneous daily  levothyroxine 25 MICROGram(s) Oral daily  pantoprazole    Tablet 40 milliGRAM(s) Oral before breakfast    MEDICATIONS  (PRN):  acetaminophen   Tablet .. 650 milliGRAM(s) Oral every 6 hours PRN Mild Pain (1 - 3)  melatonin 3 milliGRAM(s) Oral at bedtime PRN Insomnia    ALLERGIES/INTOLERANCES:  Allergies  No Known Allergies    Intolerances    VITALS & EXAMINATION:  Vital Signs Last 24 Hrs  T(C): 36.7 (25 Jul 2021 07:00), Max: 36.8 (25 Jul 2021 00:14)  T(F): 98 (25 Jul 2021 07:00), Max: 98.2 (25 Jul 2021 00:14)  HR: 76 (25 Jul 2021 07:00) (75 - 82)  BP: 100/66 (25 Jul 2021 07:00) (90/58 - 104/68)  BP(mean): --  RR: 18 (25 Jul 2021 07:00) (18 - 18)  SpO2: 97% (25 Jul 2021 07:00) (95% - 98%)    General:  Constitutional: Obese Female, appears stated age, in no apparent distress including pain  Head: Normocephalic & atraumatic.  ENT: Patent ear canals, intact TM, mucus membranes moist & pink, neck supple, no lymphadenopathy.   Respiratory: Patent airway. All lung fields are clear to auscultation bilaterally.  Extremities: No cyanosis, clubbing, or edema.  Skin: No rashes, bruising, or discoloration.    Cardiovascular (>2): RRR no murmurs. Carotid pulsations symmetric, no bruits. Normal capillary beds refill, 1-2 seconds or less.     Neurological (>12):  MS: Awake, alert, oriented to person, place, situation, time. Normal affect. Follows all commands.    Language: Speech is clear, fluent with good repetition & comprehension (able to name objects___)    CNs: PERRLA (R = 3mm, L = 3mm). VFF. EOMI no nystagmus, no diplopia. V1-3 intact to LT/pinprick, well developed masseter muscles b/l. No facial asymmetry b/l, full eye closure strength b/l. Hearing grossly normal (rubbing fingers) b/l. Symmetric palate elevation in midline. Gag reflex deferred. Head turning & shoulder shrug intact b/l. Tongue midline, normal movements, no atrophy.    Fundoscopic: pale w/ sharp discs margins No vascular changes.      Motor: Normal muscle bulk & tone. No noticeable tremor or seizure. No pronator drift.              Deltoid	Biceps	Triceps	Wrist	Finger ABd	   R	5	5	5	5	5		5 	  L	5	5	5	5	5		5    	H-Flex	H-Ext	H-ABd	H-ADd	K-Flex	K-Ext	D-Flex	P-Flex  R	5	5	5	5	5	5	5	5 	   L	5	5	5	5	5	5	5	5	     Sensation: Intact to LT/PP/Temp/Vibration/Position b/l throughout.     Cortical: Extinction on DSS (neglect): none    Reflexes:              Biceps(C5)       BR(C6)     Triceps(C7)               Patellar(L4)    Achilles(S1)    Plantar Resp  R	2	          2	             2		        2		    2		Down   L	2	          2	             2		        2		    2		Down     Coordination: intact rapid-alt movements. No dysmetria to FTN/HTS    Gait: Normal Romberg. No postural instability. Normal stance and tandem gait.     LABORATORY:  CBC   Chem       LFTs   Coagulopathy   Lipid Panel   A1c   Cardiac enzymes     U/A   CSF  Immunological  Other    STUDIES & IMAGING:  Studies (EKG, EEG, EMG, etc):     Radiology (XR, CT, MR, U/S, TTE/KEIRA): SUBJECTIVE: Patient seen and examined at the bedside. No acute complaints.     INTERVAL HISTORY:    PAST MEDICAL & SURGICAL HISTORY:  Hypothyroid    Anxiety    Myasthenia gravis    Neuromyelitis optica    MG status post thymectomy (myasthenia gravis)  2005    History of appendectomy  2001      FAMILY HISTORY:  No pertinent family history in first degree relatives      SOCIAL HISTORY:   T/E/D:   Occupation:   Lives with:     MEDICATIONS (HOME):  Home Medications:    MEDICATIONS  (STANDING):  cholecalciferol 4000 Unit(s) Oral daily  enoxaparin Injectable 40 milliGRAM(s) SubCutaneous daily  levothyroxine 25 MICROGram(s) Oral daily  pantoprazole    Tablet 40 milliGRAM(s) Oral before breakfast    MEDICATIONS  (PRN):  acetaminophen   Tablet .. 650 milliGRAM(s) Oral every 6 hours PRN Mild Pain (1 - 3)  melatonin 3 milliGRAM(s) Oral at bedtime PRN Insomnia    ALLERGIES/INTOLERANCES:  Allergies  No Known Allergies    Intolerances    VITALS & EXAMINATION:  Vital Signs Last 24 Hrs  T(C): 36.7 (25 Jul 2021 07:00), Max: 36.8 (25 Jul 2021 00:14)  T(F): 98 (25 Jul 2021 07:00), Max: 98.2 (25 Jul 2021 00:14)  HR: 76 (25 Jul 2021 07:00) (75 - 82)  BP: 100/66 (25 Jul 2021 07:00) (90/58 - 104/68)  BP(mean): --  RR: 18 (25 Jul 2021 07:00) (18 - 18)  SpO2: 97% (25 Jul 2021 07:00) (95% - 98%)    General:  Constitutional: Female, appears stated age, in no apparent distress including pain    Neurological (>12):  MS: Awake, alert, oriented to situation. Normal affect. Follows all commands.    Language: Speech is clear, fluent with good comprehension.    CNs: EOMI no nystagmus. No facial asymmetry b/l. Gag reflex deferred. Head turning & shoulder shrug intact b/l. Tongue midline, normal movements, no atrophy.    Fundoscopic: deferred    Motor: Normal muscle bulk. Moving all extremities spontaneously and against gravity.     Sensation: Intact to LT b/l throughout.     Cortical: Extinction on DSS (neglect): deferred    Reflexes: deferred    Coordination: deferred    Gait: deferred    LABORATORY:  CBC   Chem       LFTs   Coagulopathy   Lipid Panel   A1c   Cardiac enzymes     U/A   CSF  Immunological  Other    STUDIES & IMAGING:  Studies (EKG, EEG, EMG, etc):     Radiology (XR, CT, MR, U/S, TTE/KEIRA):

## 2021-07-25 NOTE — DISCHARGE NOTE NURSING/CASE MANAGEMENT/SOCIAL WORK - PATIENT PORTAL LINK FT
You can access the FollowMyHealth Patient Portal offered by [facility] by registering at the following website: [email or web address]. By joining [facility]’s FollowMyHealth portal, you will also be able to view your health information using other applications (apps) compatible with our system.

## 2021-07-25 NOTE — PROGRESS NOTE ADULT - ASSESSMENT
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 Initial vital signs significant for BP of 127/94. Physical exam significant for ____________. MRI C/T spine showed ___________. Labs unrevealing.     Impression: Sharp and burning radicular pain possibly localizing to cervical or thoracic spine of unclear etiology, possibly NMO exacerbation or pseudoexacerbation.    Radicular pain possibly from NMO:  Medications:  Completed IV steroids  PO steroid taper    Vitamin D3 4000 units daily  Pantoprazole 40 mg daily    Other:  Outpatient f/u with Dr. Resendiz    MG  Monitor    Hypothyroidism  Monitor  Continue levothyroxine 25 mcg daily    Insomnia  Melatonin 3 mg QHS prn    DVT prophylaxis: lovenox subQ  Disposition: Home    Case to be discussed with neurology attending, Dr. Young 40 year old F with a PMH of myasthenia gravis (s/p thymectomy), NMO (on inebilizumab), and hypothyroidism who presented on 7/23 wth intermittent sharp, stabbing, and burning pain radiating from her back to her abdomen for the past 3-4 days. Initial vital signs significant for BP of 127/94. Physical exam significant for ____________. MRI C/T spine showed ___________. Labs unrevealing.     Impression: Sharp and burning radicular pain possibly localizing to cervical or thoracic spine of unclear etiology, possibly NMO exacerbation or pseudoexacerbation.    Radicular pain possibly from NMO:  Medications:  Completed IV steroids  PO steroid taper    Vitamin D3 4000 units daily  Pantoprazole 40 mg daily    Other:  Outpatient f/u with Dr. Resendiz    MG  Monitor    Hypothyroidism  Monitor  Continue levothyroxine 25 mcg daily    Insomnia  Melatonin 3 mg QHS prn    DVT prophylaxis: lovenox subQ  Disposition: Home    Obtain screening lower extremity venous ultrasound in patients who meet 1 or more of the following criteria as patient is high risk for DVT/PE on admission:   [] History of DVT/PE  []Hypercoagulable states (Factor V Leiden, Cancer, OCP, etc. )  []Prolonged immobility (hemiplegia/hemiparesis/post operative or any other extended immobilization)  [] Transferred from outside facility (Rehab or Long term care)    Case to be discussed with neurology attending, Dr. Young 40 year old F with a PMH of myasthenia gravis (s/p thymectomy), NMO (on inebilizumab), and hypothyroidism who presented on 7/23 wth intermittent sharp, stabbing, and burning pain radiating from her back to her abdomen for the past 3-4 days. Initial vital signs significant for BP of 127/94. Physical exam unrevealing. MRI C/T spine read pending. Labs unrevealing.     Impression: Sharp and burning radicular pain possibly localizing to cervical or thoracic spine of unclear etiology, possibly NMO exacerbation or pseudoexacerbation.    Radicular pain possibly from NMO:  Medications:  Completed IV steroids  PO steroid taper    Vitamin D3 4000 units daily  Pantoprazole 40 mg daily    Other:  Outpatient f/u with Dr. Astrid GONZALEZ  Monitor    Hypothyroidism  Monitor  Continue levothyroxine 25 mcg daily    Insomnia  Melatonin 3 mg QHS prn    DVT prophylaxis: lovenox subQ  Disposition: Home    Obtain screening lower extremity venous ultrasound in patients who meet 1 or more of the following criteria as patient is high risk for DVT/PE on admission:   [] History of DVT/PE  []Hypercoagulable states (Factor V Leiden, Cancer, OCP, etc. )  []Prolonged immobility (hemiplegia/hemiparesis/post operative or any other extended immobilization)  [] Transferred from outside facility (Rehab or Long term care)    Case discussed with neurology attending, Dr. Young

## 2021-07-25 NOTE — PROGRESS NOTE ADULT - ATTENDING COMMENTS
NEUROLOGY ATTENDING:  JAMAAL [phone]  	  CC: ABNORMAL MRI    PATIENT SEEN & EXAMINED WITH ESPINOZA ON 7/24/2021  SUNRISE RECORDS REVIEWED     41 yo RH woman with PMHx NMO, now with flank pain that improved with solumedrol and likely represents NMO FLARE.    Denies headache, nausea/vomiting, visual changes or any other signs of increased ICP. No other seizure-like symptomatology. There are no other cranial nerve complaints: no double-vision, no blurry vision, no facial asymmetry, no trouble swallowing, no hearing loss.    Awaiting MRI spine with contrast to assess for recurrent/acute disease.  Today is day #3 of solumedrol, will d/c home on prednisone taper [id number] and follow-up with neurologist, Dr Resendiz.
NEUROLOGY ATTENDING:  JAMAAL [phone]  	  CC: ABNORMAL MRI, NMO, FLARE OF DYSESTHETIC FLANK PAIN    PATIENT SEEN & EXAMINED WITH HOUSESTAFF ON 7/25/2021  SUNRISE RECORDS REVIEWED   LABS REVIEWED    41 yo RH woman with PMHx NMO, imaging demonstrates longitudinally extensive inflammation, with more active disease posteriorly, now s/p 3 days of IV solumderol.    Patient continues to have flank pain despite initial improvement.    D/c planning with follow-up with Dr Resendiz.

## 2021-07-26 LAB
ACRM MODULATING ANTIBODY: 0.48 NMOL/L — HIGH (ref 0–0.24)
MISCELLANEOUS TEST: NORMAL
PROC NAME: NORMAL

## 2021-07-27 LAB — ACHR BLOCK AB SER-ACNC: 28 % — HIGH (ref 0–25)

## 2021-07-30 LAB
ACHR MOD AB SER-ACNC: <12 % — SIGNIFICANT CHANGE UP (ref 0–20)
AQP4 H2O CHANNEL AB SERPL IA-ACNC: 565.8 U/ML — HIGH (ref 0–3)

## 2021-09-03 ENCOUNTER — FORM ENCOUNTER (OUTPATIENT)
Age: 41
End: 2021-09-03

## 2021-09-03 ENCOUNTER — APPOINTMENT (OUTPATIENT)
Dept: NEUROLOGY | Facility: CLINIC | Age: 41
End: 2021-09-03
Payer: COMMERCIAL

## 2021-09-03 PROCEDURE — 99214 OFFICE O/P EST MOD 30 MIN: CPT | Mod: 95

## 2021-09-03 NOTE — HISTORY OF PRESENT ILLNESS
[FreeTextEntry1] : Initial hx 10/2020\par hx of myasthenia gravis - dx'd in 2005 with drooping of the L eye, weakness in both arms and legs, trouble swallowing. started imuran, prednisone, and mestinon X8mon, then thymectomy and has no longer needed meds. occ SOB residual.\par 9/2020 - developed a HA. several days later, developed abnormal sensations on the torso, burning pain, then RLE and LLE became numb, then torso became numb. Went to ED, sent home with pain medication. Then saw Dr. Pompa, ordered spine and brain MRI but wasn't able to get it before she had worsened burning sensation. Went to ED, had MRIs which showed myelitis. Got IVMP, then short prednisone taper. Burning subsided. \par Since then, has had loss of sensation from the torso down. \par Some "disturbing" electrical sensation provoked by movement. \par Stress worsens sx.\par Started PT yesterday. \par Started uplizna 12/2020\par 1/13/21 - COVID sx started friday. dx'd yesterday after covid pcr positive. generalized weakness, pressure in nose. no fevers, +cough, no SOB. + disturbance of smell and taste but may be due to congestion. will likely get antibody cocktail tomorrow. \par tapered off prednisone in mid 3/2021.\par 3/2021 - For past 3wks, when looking right and left, sees "smoke" in her vision. Some eye pain. Involving both eyes. Some conjunctival injection. Appetite has been poor. Diarrhea last week.\par 4/2021 - Over past 2d, sharp pain in the mid back behind ribs, burning on L torso. Today, mid-back is gone but burning of L torso persists. this is intermittent lasting seconds at a time. Some increased numbness in the legs., no BB dysfunction. Went to ED, had MRI T spine showing new keo enhancement consistent with relapse. Got IVMP with substantial improvement in burning. CD19 (not CD20, but drug likely out of her system by that point) was 18 cells (1%).\par Got an earlier dose of uplizna in 4/2021 due to relapse and B cells at 1%.\par Had improved prior to 4/2021 but then had a new relapse. Reports static numbness since 4/2021 but burning pain is improved.\par 7/2021 - more burning sensation of left torso and pain. went to [facility], got IVMP and felt better but scans did not demonstrate new activity.\par 8/2021 - more sx, burning, bothersome. she does not think this is a relapse. advised gabapentin 300bid and medrol dose pack for sx relief.\par \par Subj interval:\par \par Intermittent focal paresthesia in the L torso, "more disturbing than pain". Medrol dose pack didn't help. Gabapentin 300bid x3 didn't help at all.\par \par Uplizna since 12/2020, got an earlier dose in 4/2021 due to relapse and B cells at 1%.\par \par ROS/Current Sx:\par - loss of sensation from the torso down. \par - stress worsens sx.\par no BB dysfunction\par + gait dysfunction\par + constipation 2/2 numbness\par \par PMHX:\par myasthenia gravis\par myelitis\par hypothyroid\par \par MEDS:\par synthroid\par uplizna\par \par \par SHx: 1x/wk tob, no etoh, no drugs. 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 \par \par O:\par \par AO3. Normally conversant. Follows commands, names, and repeats. Good attention.\par face symm\par moves all 4 well\par gait slightly unstable but walks well without assistance\par \par 10/2020\par ESR\par b12 wnl\par TSh high but T4 wnl\par vitD 16\par HATTIE 1:320\par AQP4+\par ssa/ssb neg\par HTLV neg\par JCV+\par \par 9/2020\par copper wnl\par ACE wnl\par dsdna neg\par RF wnl\par HATTIE 1:640\par \par CSF 9/2020\par glucose nl\par prot 44\par absent OCBs\par igg index/synth wnl\par WNV neg\par WBC 26\par VDRL neg\par lyme neg\par AQP4+\par \par MRI brain 9/2020 - unrevealing\par \par MRI C+T 9/2020 - longitudinally extensive myelitis\par \par \par AP: NMO (LETM in 9/2020, apq4 + in both serum and CSF), hx of MG as well, +HATTIE. Started uplizna in 12/2020. New relapse on uplizna in 4/2021 but with Bcells at 1% (18) suggesting either lack of depletion or early re-population.\par \par This is a complicated case. We had initiailly discussed options of switching to rituximab vs soliris vs treating earlier with uplizna. She opted for earlier treatment with uplizna, with very close monitoring of B cell count. I advised that I believe that this continues to be a reasonable course of action.\par \par Answered all questions, education provided re: dx, prognosis, management. management discussed at length.\par \par - cont uplizna, would typically plan for q6m but might need earlier dosing if early-repletion vs switch to soliris or rituximab\par - check CD20 count now\par - vitD 4ku daily for vitD def\par - pt agreed to be contacted re: NENMOC*\par - counseled in the past on planned pregnancy - advised contraception when within 3m of uplizna.\par - RTC 1wk to discuss next steps

## 2021-09-06 LAB
25(OH)D3 SERPL-MCNC: 30.9 NG/ML
ALBUMIN SERPL ELPH-MCNC: 4.2 G/DL
ALP BLD-CCNC: 40 U/L
ALT SERPL-CCNC: 16 U/L
ANION GAP SERPL CALC-SCNC: 15 MMOL/L
AST SERPL-CCNC: 10 U/L
BASOPHILS # BLD AUTO: 0.05 K/UL
BASOPHILS NFR BLD AUTO: 0.5 %
BILIRUB SERPL-MCNC: 0.4 MG/DL
BUN SERPL-MCNC: 12 MG/DL
CALCIUM SERPL-MCNC: 8.7 MG/DL
CHLORIDE SERPL-SCNC: 105 MMOL/L
CO2 SERPL-SCNC: 22 MMOL/L
CREAT SERPL-MCNC: 0.56 MG/DL
EOSINOPHIL # BLD AUTO: 0.08 K/UL
EOSINOPHIL NFR BLD AUTO: 0.8 %
GLUCOSE SERPL-MCNC: 67 MG/DL
HCT VFR BLD CALC: 40.8 %
HGB BLD-MCNC: 13.2 G/DL
IMM GRANULOCYTES NFR BLD AUTO: 0.3 %
LYMPHOCYTES # BLD AUTO: 1.56 K/UL
LYMPHOCYTES NFR BLD AUTO: 14.9 %
MAN DIFF?: NORMAL
MCHC RBC-ENTMCNC: 27.6 PG
MCHC RBC-ENTMCNC: 32.4 GM/DL
MCV RBC AUTO: 85.2 FL
MONOCYTES # BLD AUTO: 0.73 K/UL
MONOCYTES NFR BLD AUTO: 7 %
NEUTROPHILS # BLD AUTO: 8.03 K/UL
NEUTROPHILS NFR BLD AUTO: 76.5 %
PLATELET # BLD AUTO: 236 K/UL
POTASSIUM SERPL-SCNC: 4.3 MMOL/L
PROT SERPL-MCNC: 6.6 G/DL
RBC # BLD: 4.79 M/UL
RBC # FLD: 13.2 %
SODIUM SERPL-SCNC: 142 MMOL/L
TSH SERPL-ACNC: 3.72 UIU/ML
WBC # FLD AUTO: 10.48 K/UL

## 2021-09-08 ENCOUNTER — TRANSCRIPTION ENCOUNTER (OUTPATIENT)
Age: 41
End: 2021-09-08

## 2021-09-09 LAB
CD16+CD56+ CELLS # BLD: 231 /UL
CD16+CD56+ CELLS NFR BLD: 17 %
CD19 CELLS NFR BLD: 12 /UL
CD3 CELLS # BLD: 1122 /UL
CD3 CELLS NFR BLD: 81 %
CD3+CD4+ CELLS # BLD: 817 /UL
CD3+CD4+ CELLS NFR BLD: 57 %
CD3+CD4+ CELLS/CD3+CD8+ CLL SPEC: 3.04 RATIO
CD3+CD8+ CELLS # SPEC: 269 /UL
CD3+CD8+ CELLS NFR BLD: 19 %
CELLS.CD3-CD19+/CELLS IN BLOOD: 1 %
DEPRECATED KAPPA LC FREE/LAMBDA SER: 0.9 RATIO
IGA SER QL IEP: 128 MG/DL
IGG SER QL IEP: 1012 MG/DL
IGM SER QL IEP: 90 MG/DL
KAPPA LC CSF-MCNC: 1.32 MG/DL
KAPPA LC SERPL-MCNC: 1.19 MG/DL

## 2021-09-10 ENCOUNTER — APPOINTMENT (OUTPATIENT)
Dept: NEUROLOGY | Facility: CLINIC | Age: 41
End: 2021-09-10

## 2021-09-10 ENCOUNTER — NON-APPOINTMENT (OUTPATIENT)
Age: 41
End: 2021-09-10

## 2021-09-10 ENCOUNTER — INPATIENT (INPATIENT)
Facility: HOSPITAL | Age: 41
LOS: 4 days | Discharge: HOME | DRG: 60 | End: 2021-09-15
Attending: PSYCHIATRY & NEUROLOGY | Admitting: PSYCHIATRY & NEUROLOGY
Payer: COMMERCIAL

## 2021-09-10 VITALS
OXYGEN SATURATION: 97 % | WEIGHT: 164.91 LBS | HEIGHT: 62 IN | HEART RATE: 89 BPM | DIASTOLIC BLOOD PRESSURE: 86 MMHG | SYSTOLIC BLOOD PRESSURE: 122 MMHG | TEMPERATURE: 98 F | RESPIRATION RATE: 18 BRPM

## 2021-09-10 DIAGNOSIS — G70.00 MYASTHENIA GRAVIS WITHOUT (ACUTE) EXACERBATION: Chronic | ICD-10-CM

## 2021-09-10 DIAGNOSIS — Z90.49 ACQUIRED ABSENCE OF OTHER SPECIFIED PARTS OF DIGESTIVE TRACT: Chronic | ICD-10-CM

## 2021-09-10 LAB
ALBUMIN SERPL ELPH-MCNC: 4.3 G/DL — SIGNIFICANT CHANGE UP (ref 3.3–5)
ALP SERPL-CCNC: 42 U/L — SIGNIFICANT CHANGE UP (ref 40–120)
ALT FLD-CCNC: 14 U/L — SIGNIFICANT CHANGE UP (ref 10–45)
ANION GAP SERPL CALC-SCNC: 17 MMOL/L — SIGNIFICANT CHANGE UP (ref 5–17)
APPEARANCE UR: CLEAR — SIGNIFICANT CHANGE UP
AST SERPL-CCNC: 19 U/L — SIGNIFICANT CHANGE UP (ref 10–40)
BACTERIA # UR AUTO: NEGATIVE — SIGNIFICANT CHANGE UP
BASOPHILS # BLD AUTO: 0.04 K/UL — SIGNIFICANT CHANGE UP (ref 0–0.2)
BASOPHILS NFR BLD AUTO: 0.4 % — SIGNIFICANT CHANGE UP (ref 0–2)
BILIRUB SERPL-MCNC: 0.3 MG/DL — SIGNIFICANT CHANGE UP (ref 0.2–1.2)
BILIRUB UR-MCNC: NEGATIVE — SIGNIFICANT CHANGE UP
BUN SERPL-MCNC: 16 MG/DL — SIGNIFICANT CHANGE UP (ref 7–23)
CALCIUM SERPL-MCNC: 9.4 MG/DL — SIGNIFICANT CHANGE UP (ref 8.4–10.5)
CHLORIDE SERPL-SCNC: 105 MMOL/L — SIGNIFICANT CHANGE UP (ref 96–108)
CO2 SERPL-SCNC: 20 MMOL/L — LOW (ref 22–31)
COLOR SPEC: SIGNIFICANT CHANGE UP
CREAT SERPL-MCNC: 0.53 MG/DL — SIGNIFICANT CHANGE UP (ref 0.5–1.3)
DIFF PNL FLD: ABNORMAL
EOSINOPHIL # BLD AUTO: 0.07 K/UL — SIGNIFICANT CHANGE UP (ref 0–0.5)
EOSINOPHIL NFR BLD AUTO: 0.7 % — SIGNIFICANT CHANGE UP (ref 0–6)
EPI CELLS # UR: 6 /HPF — HIGH
ERYTHROCYTE [SEDIMENTATION RATE] IN BLOOD: 14 MM/HR — SIGNIFICANT CHANGE UP (ref 0–15)
GLUCOSE SERPL-MCNC: 108 MG/DL — HIGH (ref 70–99)
GLUCOSE UR QL: ABNORMAL
HCG SERPL-ACNC: <2 MIU/ML — SIGNIFICANT CHANGE UP
HCT VFR BLD CALC: 40.1 % — SIGNIFICANT CHANGE UP (ref 34.5–45)
HGB BLD-MCNC: 12.8 G/DL — SIGNIFICANT CHANGE UP (ref 11.5–15.5)
HYALINE CASTS # UR AUTO: 0 /LPF — SIGNIFICANT CHANGE UP (ref 0–2)
IMM GRANULOCYTES NFR BLD AUTO: 0.3 % — SIGNIFICANT CHANGE UP (ref 0–1.5)
KETONES UR-MCNC: NEGATIVE — SIGNIFICANT CHANGE UP
LEUKOCYTE ESTERASE UR-ACNC: NEGATIVE — SIGNIFICANT CHANGE UP
LYMPHOCYTES # BLD AUTO: 0.91 K/UL — LOW (ref 1–3.3)
LYMPHOCYTES # BLD AUTO: 9.3 % — LOW (ref 13–44)
MCHC RBC-ENTMCNC: 26.6 PG — LOW (ref 27–34)
MCHC RBC-ENTMCNC: 31.9 GM/DL — LOW (ref 32–36)
MCV RBC AUTO: 83.4 FL — SIGNIFICANT CHANGE UP (ref 80–100)
MONOCYTES # BLD AUTO: 0.37 K/UL — SIGNIFICANT CHANGE UP (ref 0–0.9)
MONOCYTES NFR BLD AUTO: 3.8 % — SIGNIFICANT CHANGE UP (ref 2–14)
NEUTROPHILS # BLD AUTO: 8.35 K/UL — HIGH (ref 1.8–7.4)
NEUTROPHILS NFR BLD AUTO: 85.5 % — HIGH (ref 43–77)
NITRITE UR-MCNC: NEGATIVE — SIGNIFICANT CHANGE UP
NRBC # BLD: 0 /100 WBCS — SIGNIFICANT CHANGE UP (ref 0–0)
PH UR: 6.5 — SIGNIFICANT CHANGE UP (ref 5–8)
PLATELET # BLD AUTO: 114 K/UL — LOW (ref 150–400)
POTASSIUM SERPL-MCNC: 3.9 MMOL/L — SIGNIFICANT CHANGE UP (ref 3.5–5.3)
POTASSIUM SERPL-SCNC: 3.9 MMOL/L — SIGNIFICANT CHANGE UP (ref 3.5–5.3)
PROT SERPL-MCNC: 7.2 G/DL — SIGNIFICANT CHANGE UP (ref 6–8.3)
PROT UR-MCNC: SIGNIFICANT CHANGE UP
RBC # BLD: 4.81 M/UL — SIGNIFICANT CHANGE UP (ref 3.8–5.2)
RBC # FLD: 13.5 % — SIGNIFICANT CHANGE UP (ref 10.3–14.5)
RBC CASTS # UR COMP ASSIST: 440 /HPF — HIGH (ref 0–4)
SARS-COV-2 RNA SPEC QL NAA+PROBE: SIGNIFICANT CHANGE UP
SODIUM SERPL-SCNC: 142 MMOL/L — SIGNIFICANT CHANGE UP (ref 135–145)
SP GR SPEC: 1.03 — HIGH (ref 1.01–1.02)
UROBILINOGEN FLD QL: NEGATIVE — SIGNIFICANT CHANGE UP
WBC # BLD: 9.77 K/UL — SIGNIFICANT CHANGE UP (ref 3.8–10.5)
WBC # FLD AUTO: 9.77 K/UL — SIGNIFICANT CHANGE UP (ref 3.8–10.5)
WBC UR QL: 15 /HPF — HIGH (ref 0–5)

## 2021-09-10 PROCEDURE — 72156 MRI NECK SPINE W/O & W/DYE: CPT | Mod: 26,MA

## 2021-09-10 PROCEDURE — 72157 MRI CHEST SPINE W/O & W/DYE: CPT | Mod: 26,MA

## 2021-09-10 RX ORDER — LIDOCAINE 4 G/100G
1 CREAM TOPICAL ONCE
Refills: 0 | Status: COMPLETED | OUTPATIENT
Start: 2021-09-10 | End: 2021-09-10

## 2021-09-10 RX ORDER — KETOROLAC TROMETHAMINE 30 MG/ML
15 SYRINGE (ML) INJECTION ONCE
Refills: 0 | Status: DISCONTINUED | OUTPATIENT
Start: 2021-09-10 | End: 2021-09-10

## 2021-09-10 RX ORDER — LEVOTHYROXINE SODIUM 125 MCG
25 TABLET ORAL DAILY
Refills: 0 | Status: DISCONTINUED | OUTPATIENT
Start: 2021-09-10 | End: 2021-09-15

## 2021-09-10 NOTE — ED CDU PROVIDER INITIAL DAY NOTE - CHIEF COMPLAINT
Detail Level: Zone Initiate Treatment: Plan to use the Lotrisone cream 2xs daily x 2-4 weeks. The patient is a 41y Female complaining of back pain general.

## 2021-09-10 NOTE — ED PROVIDER NOTE - OBJECTIVE STATEMENT
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 Reports that this feels very similar to her prior NMO flare up. Was seen by Dr. Resendiz telehealth about a week ago and was prescribed 20mg prednisone. Took it the last 3 days and also this morning at around 11-12, with no improvement of symptoms. Currently c/o severe pain in the mid-back, no radiation of pain, sharp, non-positional. + whole body numbness, L>R side. Reports baseline sensory deficits in the left side. Ambulatory at baseline. Denies fever, blurry vision, change in vision, ringing in ears, chest pain, shortness of breath, abdominal pain or change in urinary/bowel habits. Patient has an appointment with Dr. Resendiz today at 4 pm.

## 2021-09-10 NOTE — ED PROVIDER NOTE - CLINICAL SUMMARY MEDICAL DECISION MAKING FREE TEXT BOX
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 Vital signs normal. PE remarkable for subjective numbness and decreased sensation throughout, but with normal motor functions. Will consult neurology. CBC, CMP, CRP, ESR, CPK, HCG. COVID-19. 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 Vital signs normal. PE remarkable for subjective numbness and decreased sensation throughout, but with normal motor functions. Will consult neurology. CBC, CMP, CRP, ESR, CPK, HCG. COVID-19. 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 Vital signs normal. PE remarkable for subjective numbness and decreased sensation throughout, but with normal motor functions. Will consult neurology. CBC, CMP, CRP, ESR, CPK, HCG. COVID-19.    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  Consider disease relapse.  Motor upper and lower extremities intact; decreased sensation to LE bilaterally.   Need MRI of cervical and thoracic spine and neurology consult.  Consider restarting high dose steroids pending neurology recommendations.

## 2021-09-10 NOTE — ED CDU PROVIDER INITIAL DAY NOTE - NEUROLOGICAL, MLM
Alert and oriented, pt reports "numbness" b/l LE, +decreased sensation LUE compared to RUE, "numbness" of face. motor intact x 4

## 2021-09-10 NOTE — ED ADULT NURSE NOTE - NSIMPLEMENTINTERV_GEN_ALL_ED
Implemented All Universal Safety Interventions:  [place] to call system. Call bell, personal items and telephone within reach. Instruct patient to call for assistance. Room bathroom lighting operational. Non-slip footwear when patient is off stretcher. Physically safe environment: no spills, clutter or unnecessary equipment. Stretcher in lowest position, wheels locked, appropriate side rails in place.

## 2021-09-10 NOTE — ED CDU PROVIDER INITIAL DAY NOTE - MEDICAL DECISION MAKING DETAILS
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  Consider disease relapse.  Motor upper and lower extremities intact; decreased sensation to LE bilaterally.   Need MRI of cervical and thoracic spine and neurology consult.  Consider restarting high dose steroids pending neurology recommendations.

## 2021-09-10 NOTE — ED CDU PROVIDER INITIAL DAY NOTE - DETAILS
frequent reeval, vitals q 4hrs, tele, neurochecks q 4hrs, MRI c-spine and t-spine, neuro following  d/w attending

## 2021-09-10 NOTE — CONSULT NOTE ADULT - ASSESSMENT
Assessment:    Impression:    Plan:  Imaging:  MRI cervical and thoracic spine w/ and w/o contrast    Medications:  If MRI's show active areas of demyelination, then treat with IV solumedrol 1 g daily for 3 days    Other:  Outpatient neurology follow up with Dr. Resendiz.    Case discussed with patient's neurologist, Dr. Resendiz.  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 Initial vital signs unrevealing. Physical exam shows decreased sensation in the lower extremities (R>L) and sustained ankle clonus b/l. Labs significant for platelets of 114 and CRP of 6.     Impression: Sharp and burning radicular pain and worsening numbness possibly localizing to cervical or thoracic spine of unclear etiology, possibly NMO exacerbation or pseudoexacerbation.    Plan:  Imaging:  MRI cervical and thoracic spine w/ and w/o contrast    Medications:  If MRI's show active areas of demyelination, then treat with IV solumedrol 1 g daily for 3 days (with GI prophylaxis, [facility])    Labs:  F/u infectious workup, ESR    Other:  Outpatient neurology follow up with Dr. Resendiz.  If active areas of demyelination on MRI, then admit to neurology under Dr. Ames for further treatment.   Neurochecks    Case discussed with patient's neurologist, Dr. Resendiz.  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 Initial vital signs unrevealing. Physical exam shows decreased sensation in the lower extremities (R>L) and sustained ankle clonus b/l. Labs significant for platelets of 114 and CRP of 6.     Impression: Sharp/stabbing mid-thoracic back pain and worsening mid-thoracic back numbness possibly localizing to cervical or thoracic spine of unclear etiology, possibly NMO exacerbation or pseudoexacerbation.    Plan:  Imaging:  MRI cervical and thoracic spine w/ and w/o contrast    Medications:  If MRI's show active areas of demyelination, then treat with IV solumedrol 1 g daily for 3 days (with GI prophylaxis, [facility])    Labs:  F/u infectious workup, ESR    Other:  Outpatient neurology follow up with Dr. Resendiz.  If active areas of demyelination on MRI, then admit to neurology under Dr. Ames for further treatment.   Neurochecks    Case discussed with patient's neurologist, Dr. Resendiz, and neurology attending, Dr. Ames

## 2021-09-10 NOTE — ED CDU PROVIDER INITIAL DAY NOTE - ATTENDING CONTRIBUTION TO CARE
Attending MD Jimenez:   I personally have seen and examined this patient.  Physician assistant note reviewed and agree on plan of care and except where noted.

## 2021-09-10 NOTE — ED PROVIDER NOTE - OBSERVING MD:
Dr. Christi Jimenez Melolabial Transposition Flap Text: The defect edges were debeveled with a #15 scalpel blade.  Given the location of the defect and the proximity to free margins a melolabial flap was deemed most appropriate.  Using a sterile surgical marker, an appropriate melolabial transposition flap was drawn incorporating the defect.    The area thus outlined was incised deep to adipose tissue with a #15 scalpel blade.  The skin margins were undermined to an appropriate distance in all directions utilizing iris scissors.

## 2021-09-10 NOTE — ED CDU PROVIDER DISPOSITION NOTE - CLINICAL COURSE
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 Reports that this feels very similar to her prior NMO flare up. Was seen by Dr. Resendiz [facility] about a week ago and was prescribed 20mg prednisone. Took it the last 3 days and also this morning at around 11-12, with no improvement of symptoms. Currently c/o severe pain in the mid left back, no radiation of pain, sharp, non-positional. + whole body numbness, L>R side. Reports baseline sensory deficits in the left side. Ambulatory at baseline. Denies fever, blurry vision, change in vision, ringing in ears, chest pain, shortness of breath, abdominal pain or change in urinary/bowel habits.  In ED, pt had blood work and urinalysis done. no acute/emergent findings. pt seen by Neuro- recommend CDU for MRI c-spine and t-spine and neurochecks. 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 Reports that this feels very similar to her prior NMO flare up. Was seen by Dr. Resendiz [facility] about a week ago and was prescribed 20mg prednisone. Took it the last 3 days and also this morning at around 11-12, with no improvement of symptoms. Currently c/o severe pain in the mid left back, no radiation of pain, sharp, non-positional. + whole body numbness, L>R side. Reports baseline sensory deficits in the left side. Ambulatory at baseline. Denies fever, blurry vision, change in vision, ringing in ears, chest pain, shortness of breath, abdominal pain or change in urinary/bowel habits.  In ED, pt had blood work and urinalysis done. no acute/emergent findings. pt seen by Neuro- recommend CDU for MRI c-spine and t-spine and neuro checks.  Pt with abnormal MRI results, new lesion noted. Pt given steroids per neuro recs and admitted to neuro service.

## 2021-09-10 NOTE — ED PROVIDER NOTE - PHYSICAL EXAMINATION
General: non-toxic appearing, in no respiratory distress  HEENT: atraumatic, normocephalic; pupils are equal, round and react to light, extraocular movements intact bilaterally without deficits, no conjunctival pallor, mucous membranes moist  Neck: no jugular venous distension, full range of motion  Chest/Lung: clear to auscultation bilaterally, no wheezes/rhonchi/rales  Heart: regular rate and rhythm, no murmur/gallops/rubs  Abdomen: normal bowel sounds, soft, non-tender, non-distended  Extremities: no lower extremity edema, +2 radial pulses bilaterally, +2 dorsalis pedis pulses bilaterally  Musculoskeletal: full range of motion of all 4 extremities, tenderness to palpation in lower thoracic midline and left paraspinal area   Nervous System: alert and oriented, no motor deficits with 5/5 strength in all 4 extremities, decreased sensation in upper and lower extremities (L>R); CNII-XII intact  Skin: no rashes/lacerations noted

## 2021-09-10 NOTE — ED ADULT NURSE NOTE - OBJECTIVE STATEMENT
41 year old female presented to ED with c/o of constant mid back pain and whole body numbness. hx myasthenia gravis, neuromyelitis optica, hypothyroidism. Pt reports seeing Dr. Resendiz via telehealth 1 week ago and prescribed prednisone. pt reports no improvement. pt denies CP, SOB, nausea/vomiting, tingling, fever, cough, chills, dizziness, headache, blurred vision, neuro intact. pt a&ox3, lung sounds clear, heart rate regular, abdomen soft nontender nondistended to palp. skin intact. IV in left hand 22G and patent. Will continue to monitor and assess while offering support and reassurance.

## 2021-09-10 NOTE — ED PROVIDER NOTE - NS ED ROS FT
General: Reports whole body numbness and chills; denies fever, unexpected weight loss/gain  HEENT: Reports headache; denies vision changes, tinnitus, sore throat  Cardiovascular: Denies chest pain, palpitations  Skin: Denies rash, erythema, color changes  Respiratory: Denies shortness of breath, difficulty breathing, cough  Endocrine: Denies sensitivity to heat, cold, increased urination  Gastrointestinal: Denies abdominal pain, nausea, vomiting, diarrhea, constipation, changes in bowel habits  Musculoskeletal: Denies back pain, lower extremity swelling, extremity pain  Genitourinary: Denies hematuria, dysuria, increased frequency  Neurologic: Denies loss of consciousness, weakness, numbness, tingling  Psychiatric: Denies history of psych, hallucinations General: Reports whole body numbness and chills; denies fever, unexpected weight loss/gain  HEENT: Reports headache; denies vision changes, tinnitus, sore throat  Cardiovascular: Denies chest pain, palpitations  Skin: Denies rash, erythema, color changes  Respiratory: Denies shortness of breath, difficulty breathing, cough  Endocrine: Denies sensitivity to heat, cold, increased urination  Gastrointestinal: Denies abdominal pain, nausea, vomiting, diarrhea, constipation, changes in bowel habits  Musculoskeletal: Reports mid back pain; denies lower extremity swelling, extremity pain  Genitourinary: Denies hematuria, dysuria, increased frequency  Neurologic: Reports whole body numbness; denies loss of consciousness, weakness  Psychiatric: Denies history of psych, hallucinations

## 2021-09-10 NOTE — ED CDU PROVIDER DISPOSITION NOTE - ATTENDING CONTRIBUTION TO CARE
Pt observed for back/flank pains likely neuropathic pain, consulted neurologist and MRI spine obtained, pt with intractable pain, IV high dose steroids started and pt admitted to neurology service.

## 2021-09-10 NOTE — ED CDU PROVIDER DISPOSITION NOTE - NSFOLLOWUPINSTRUCTIONS_ED_ALL_ED_FT
1. stay hydrated.  2. continue current home medications  3. Follow up with your PCP in1 -2 days.  4. Follow up with your Neurologist Dr. Resendiz in 2-3 days.  5. Bring Printed Results to your Doctor Visits. Stroke Education given to you.  6. Return if symptoms worsen, fever, weakness, numbness, dizziness, vision change, slurred speech and all other concerns

## 2021-09-10 NOTE — ED CDU PROVIDER INITIAL DAY NOTE - OBJECTIVE STATEMENT
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 Reports that this feels very similar to her prior NMO flare up. Was seen by Dr. Astrid haywood about a week ago and was prescribed 20mg prednisone. Took it the last 3 days and also this morning at around 11-12, with no improvement of symptoms. Currently c/o severe pain in the mid left back, no radiation of pain, sharp, non-positional. + whole body numbness, L>R side. Reports baseline sensory deficits in the left side. Ambulatory at baseline. Denies fever, blurry vision, change in vision, ringing in ears, chest pain, shortness of breath, abdominal pain or change in urinary/bowel habits.

## 2021-09-10 NOTE — CONSULT NOTE ADULT - SUBJECTIVE AND OBJECTIVE BOX
HPI:    (Stroke only)  NIHSS:   MRS:   ICH:     REVIEW OF SYSTEMS  General:	  Skin/Breast:	  Ophthalmologic:  ENMT:	  Respiratory and Thorax:	  Cardiovascular:	  Gastrointestinal:	  Genitourinary:	  Musculoskeletal:	  Neurological:	  Psychiatric:	  Hematology/Lymphatics:	  Endocrine:	  Allergic/Immunologic:	    A 10-system ROS was performed and is negative except for those items noted above and/or in the HPI.    PAST MEDICAL & SURGICAL HISTORY:  Hypothyroid    Anxiety    Myasthenia gravis    Neuromyelitis optica    MG status post thymectomy (myasthenia gravis)      History of appendectomy        FAMILY HISTORY:  No pertinent family history in first degree relatives      SOCIAL HISTORY:   T/E/D:   Occupation:   Lives with:     MEDICATIONS (HOME):  Home Medications:    MEDICATIONS  (STANDING):    MEDICATIONS  (PRN):    ALLERGIES/INTOLERANCES:  Allergies  No Known Allergies    Intolerances    VITALS & EXAMINATION:  Vital Signs Last 24 Hrs  T(C): 36.9 (10 Sep 2021 12:24), Max: 36.9 (10 Sep 2021 12:24)  T(F): 98.5 (10 Sep 2021 12:24), Max: 98.5 (10 Sep 2021 12:24)  HR: 89 (10 Sep 2021 12:24) (89 - 89)  BP: 122/86 (10 Sep 2021 12:24) (122/86 - 122/86)  BP(mean): --  RR: 18 (10 Sep 2021 12:24) (18 - 18)  SpO2: 97% (10 Sep 2021 12:24) (97% - 97%)    General:  Constitutional: Obese Female, appears stated age, in no apparent distress including pain  Head: Normocephalic & atraumatic.  ENT: Patent ear canals, intact TM, mucus membranes moist & pink, neck supple, no lymphadenopathy.   Respiratory: Patent airway. All lung fields are clear to auscultation bilaterally.  Extremities: No cyanosis, clubbing, or edema.  Skin: No rashes, bruising, or discoloration.    Cardiovascular (>2): RRR no murmurs. Carotid pulsations symmetric, no bruits. Normal capillary beds refill, 1-2 seconds or less.     Neurological (>12):  MS: Awake, alert, oriented to person, place, situation, time. Normal affect. Follows all commands.    Language: Speech is clear, fluent with good repetition & comprehension (able to name objects___)    CNs: PERRLA (R = 3mm, L = 3mm). VFF. EOMI no nystagmus, no diplopia. V1-3 intact to LT/pinprick, well developed masseter muscles b/l. No facial asymmetry b/l, full eye closure strength b/l. Hearing grossly normal (rubbing fingers) b/l. Symmetric palate elevation in midline. Gag reflex deferred. Head turning & shoulder shrug intact b/l. Tongue midline, normal movements, no atrophy.    Fundoscopic: pale w/ sharp discs margins No vascular changes.      Motor: Normal muscle bulk & tone. No noticeable tremor or seizure. No pronator drift.              Deltoid	Biceps	Triceps	Wrist	Finger ABd	   R	5	5	5	5	5		5 	  L	5	5	5	5	5		5    	H-Flex	H-Ext	H-ABd	H-ADd	K-Flex	K-Ext	D-Flex	P-Flex  R	5	5	5	5	5	5	5	5 	   L	5	5	5	5	5	5	5	5	     Sensation: Intact to LT/PP/Temp/Vibration/Position b/l throughout.     Cortical: Extinction on DSS (neglect): none    Reflexes:              Biceps(C5)       BR(C6)     Triceps(C7)               Patellar(L4)    Achilles(S1)    Plantar Resp  R	2	          2	             2		        2		    2		Down   L	2	          2	             2		        2		    2		Down     Coordination: intact rapid-alt movements. No dysmetria to FTN/HTS    Gait: Normal Romberg. No postural instability. Normal stance and tandem gait.     LABORATORY:  CBC                       12.8   9.77  )-----------( 114      ( 10 Sep 2021 13:57 )             40.1     Chem 09-10    142  |  105  |  16  ----------------------------<  108<H>  3.9   |  20<L>  |  0.53    Ca    9.4      10 Sep 2021 13:57    TPro  7.2  /  Alb  4.3  /  TBili  0.3  /  DBili  x   /  AST  19  /  ALT  14  /  AlkPhos  42  09-10    LFTs LIVER FUNCTIONS - ( 10 Sep 2021 13:57 )  Alb: 4.3 g/dL / Pro: 7.2 g/dL / ALK PHOS: 42 U/L / ALT: 14 U/L / AST: 19 U/L / GGT: x           Coagulopathy   Lipid Panel   A1c   Cardiac enzymes CARDIAC MARKERS ( 10 Sep 2021 13:57 )  x     / x     / 77 U/L / x     / x          U/A Urinalysis Basic - ( 10 Sep 2021 14:02 )    Color: Light Yellow / Appearance: Clear / S.034 / pH: x  Gluc: x / Ketone: Negative  / Bili: Negative / Urobili: Negative   Blood: x / Protein: Trace / Nitrite: Negative   Leuk Esterase: Negative / RBC: 440 /hpf / WBC 15 /HPF   Sq Epi: x / Non Sq Epi: 6 /hpf / Bacteria: Negative      CSF  Immunological  Other    STUDIES & IMAGING:  Studies (EKG, EEG, EMG, etc):     Radiology (XR, CT, MR, U/S, TTE/KEIRA): HPI: 41 year old right-handed F with a PMH of antibody positive myasthenia gravis (binding/modulating Ab's, s/p thymectomy), NMO (on inebilizumab), and hypothyroidism who presented on 9/10 with two weeks of stabbing/sharp pain and worsening numbness in her left mid-back. Patient saw Dr. Resendiz on 9/3. Patient states that initially she felt electric shocks in the bottom of her feet as well as light shocks throughout her body. She then felt "light" numbness in a spot over the left mid-back that worsened over days and then moved to the center of her back. She then noticed a headache afterward, which eventually resolved.       REVIEW OF SYSTEMS  A 10-system ROS was performed and is negative except for those items noted above and/or in the HPI.    PAST MEDICAL & SURGICAL HISTORY:  Hypothyroid    Anxiety    Myasthenia gravis    Neuromyelitis optica    MG status post thymectomy (myasthenia gravis)      History of appendectomy        FAMILY HISTORY:  No pertinent family history in first degree relatives      SOCIAL HISTORY:   T/E/D:   Occupation:   Lives with:     MEDICATIONS (HOME):  Home Medications:    MEDICATIONS  (STANDING):    MEDICATIONS  (PRN):    ALLERGIES/INTOLERANCES:  Allergies  No Known Allergies    Intolerances    VITALS & EXAMINATION:  Vital Signs Last 24 Hrs  T(C): 36.9 (10 Sep 2021 12:24), Max: 36.9 (10 Sep 2021 12:24)  T(F): 98.5 (10 Sep 2021 12:24), Max: 98.5 (10 Sep 2021 12:24)  HR: 89 (10 Sep 2021 12:24) (89 - 89)  BP: 122/86 (10 Sep 2021 12:24) (122/86 - 122/86)  BP(mean): --  RR: 18 (10 Sep 2021 12:24) (18 - 18)  SpO2: 97% (10 Sep 2021 12:24) (97% - 97%)    General: Obese Female, appears stated age, in no apparent distress including pain  Eyes: clear conjunctiva  Respiratory: no respiratory distress, breathing comfortably on room air  Extremities: no calf tenderness to palpation    Neurological (>12):  MS: Awake, alert, oriented to person, place, situation, time. Normal affect. Follows all commands.    Language: Speech is clear, fluent with good comprehension    CNs: PERRL (R = 3mm, L = 3mm), no APD. CVF full. EOMI, left beating torsional nystagmus on left gaze. V1-3 intact to LT b/l. No facial asymmetry b/l. Hearing grossly normal (rubbing fingers) b/l. Symmetric palate elevation in midline. Gag reflex deferred. Head turning & shoulder shrug intact b/l. Tongue midline, normal movements, no atrophy.    Fundoscopic: deferred    Motor: Normal muscle bulk & tone. No noticeable tremor. No pronator drift. Normal FFM b/l.               Deltoid	Biceps	Triceps	Wrist	Finger ABd	   R	5	5	5	5	5		5 	  L	5	5	5	5	5		5    	H-Flex	H-Ext	H-ABd	H-ADd	K-Flex	K-Ext	D-Flex	P-Flex  R	4+	5	-	-	5	5	5	5 	   L	5	5	-	-	5	5	5	5	     Sensation: ?absent to LT in b/l lower extremities. Intact to LT in bilateral upper extremities. 95% PP in LUE compared to RUE. intact to PP in b/l LE. Absent to temperature in b/l LE. JPS absent at toes in RLE, intact at ankle. JPS intact at toes in LLE. Vibration absent in RLE, intact at ASIS. Vibration decreased at toes in LLE.      Decreased sensation to PP at lower back in left paraspinal area, increased sensation to PP at lower back in right paraspinal area.     Cortical: Extinction on DSS (neglect): none    Reflexes:              Biceps(C5)       BR(C6)     Triceps(C7)               Patellar(L4)    Achilles(S1)    Plantar Resp  R	2	          2	             2		        3		    2		Down   L	2	          2	             2		        3		    2		Down     >10 beats of ankle clonus b/l     Coordination: No dysmetria to FTN/HTS    Gait: Normal Romberg. No postural instability. Normal stance, arm swing, and gait.     LABORATORY:  CBC                       12.8   9.77  )-----------( 114      ( 10 Sep 2021 13:57 )             40.1     Chem 09-10    142  |  105  |  16  ----------------------------<  108<H>  3.9   |  20<L>  |  0.53    Ca    9.4      10 Sep 2021 13:57    TPro  7.2  /  Alb  4.3  /  TBili  0.3  /  DBili  x   /  AST  19  /  ALT  14  /  AlkPhos  42  09-10    LFTs LIVER FUNCTIONS - ( 10 Sep 2021 13:57 )  Alb: 4.3 g/dL / Pro: 7.2 g/dL / ALK PHOS: 42 U/L / ALT: 14 U/L / AST: 19 U/L / GGT: x           Coagulopathy   Lipid Panel   A1c   Cardiac enzymes CARDIAC MARKERS ( 10 Sep 2021 13:57 )  x     / x     / 77 U/L / x     / x          U/A Urinalysis Basic - ( 10 Sep 2021 14:02 )    Color: Light Yellow / Appearance: Clear / S.034 / pH: x  Gluc: x / Ketone: Negative  / Bili: Negative / Urobili: Negative   Blood: x / Protein: Trace / Nitrite: Negative   Leuk Esterase: Negative / RBC: 440 /hpf / WBC 15 /HPF   Sq Epi: x / Non Sq Epi: 6 /hpf / Bacteria: Negative      CSF  Immunological  Other    STUDIES & IMAGING:  Studies (EKG, EEG, EMG, etc):     Radiology (XR, CT, MR, U/S, TTE/KEIRA): HPI: 41 year old right-handed F with a PMH of antibody positive myasthenia gravis (binding/modulating Ab's, s/p thymectomy), NMO (on inebilizumab), and hypothyroidism who presented on 9/10 with two weeks of stabbing/sharp pain and worsening numbness in her left mid-back. Patient saw Dr. Resendiz on 9/3 and was given PO prednisone. Patient states that initially she felt electric shocks in the bottom of her feet as well as light shocks throughout her body. She then felt "light" numbness in a spot over the left mid-back that worsened over days and then moved to the center of her back. She then noticed a headache afterward, which eventually resolved. This morning, the patient woke up and felt that the numbness was much worse. Patient has residual band like feeling around her chest from initial NMO presentation. Patient has left torso discomfort that is intermittent over the past three days. Patient also endorses hot flashes, chills, and lip tingling. She had an episode of left arm numbness that lasted for one day as well. She denies any vision changes or weakness. She denies pain or difficulty with urination, cough, or sick contacts.       REVIEW OF SYSTEMS  A 10-system ROS was performed and is negative except for those items noted above and/or in the HPI.    PAST MEDICAL & SURGICAL HISTORY:  Hypothyroid    Anxiety    Myasthenia gravis    Neuromyelitis optica    MG status post thymectomy (myasthenia gravis)      History of appendectomy        FAMILY HISTORY:  No pertinent family history in first degree relatives      SOCIAL HISTORY:   T/E/D: denies    MEDICATIONS (HOME):  Home Medications:    MEDICATIONS  (STANDING):    MEDICATIONS  (PRN):    ALLERGIES/INTOLERANCES:  Allergies  No Known Allergies    Intolerances    VITALS & EXAMINATION:  Vital Signs Last 24 Hrs  T(C): 36.9 (10 Sep 2021 12:24), Max: 36.9 (10 Sep 2021 12:24)  T(F): 98.5 (10 Sep 2021 12:24), Max: 98.5 (10 Sep 2021 12:24)  HR: 89 (10 Sep 2021 12:24) (89 - 89)  BP: 122/86 (10 Sep 2021 12:24) (122/86 - 122/86)  BP(mean): --  RR: 18 (10 Sep 2021 12:24) (18 - 18)  SpO2: 97% (10 Sep 2021 12:24) (97% - 97%)    General: Obese Female, appears stated age, in no apparent distress including pain  Eyes: clear conjunctiva  Respiratory: no respiratory distress, breathing comfortably on room air  Extremities: no calf tenderness to palpation    Neurological (>12):  MS: Awake, alert, oriented to person, place, situation, time. Normal affect. Follows all commands.    Language: Speech is clear, fluent with good comprehension    CNs: PERRL (R = 3mm, L = 3mm), no APD. CVF full. EOMI, left beating torsional nystagmus on left gaze. V1-3 intact to LT b/l. No facial asymmetry b/l. Hearing grossly normal (rubbing fingers) b/l. Symmetric palate elevation in midline. Gag reflex deferred. Head turning & shoulder shrug intact b/l. Tongue midline, normal movements, no atrophy.    Fundoscopic: deferred    Motor: Normal muscle bulk & tone. No noticeable tremor. No pronator drift. Normal FFM b/l.               Deltoid	Biceps	Triceps	Wrist	Finger ABd	   R	5	5	5	5	5		5 	  L	5	5	5	5	5		5    	H-Flex	H-Ext	H-ABd	H-ADd	K-Flex	K-Ext	D-Flex	P-Flex  R	4+	5	-	-	5	5	5	5 	   L	5	5	-	-	5	5	5	5	     Sensation: ?absent to LT in b/l lower extremities. Intact to LT in bilateral upper extremities. 95% PP in LUE compared to RUE. intact to PP in b/l LE. Absent to temperature in b/l LE. JPS absent at toes in RLE, intact at ankle. JPS intact at toes in LLE. Vibration absent in RLE, intact at ASIS. Vibration decreased at toes in LLE.      Decreased sensation to PP at lower back in left paraspinal area, increased sensation to PP at lower back in right paraspinal area.     Cortical: Extinction on DSS (neglect): none    Reflexes:              Biceps(C5)       BR(C6)     Triceps(C7)               Patellar(L4)    Achilles(S1)    Plantar Resp  R	2	          2	             2		        3		    2		Down   L	2	          2	             2		        3		    2		Down     >10 beats of ankle clonus b/l     Coordination: No dysmetria to FTN/HTS    Gait: Normal Romberg. No postural instability. Normal stance, arm swing, and gait.     LABORATORY:  CBC                       12.8   9.77  )-----------( 114      ( 10 Sep 2021 13:57 )             40.1     Chem 09-10    142  |  105  |  16  ----------------------------<  108<H>  3.9   |  20<L>  |  0.53    Ca    9.4      10 Sep 2021 13:57    TPro  7.2  /  Alb  4.3  /  TBili  0.3  /  DBili  x   /  AST  19  /  ALT  14  /  AlkPhos  42  09-10    LFTs LIVER FUNCTIONS - ( 10 Sep 2021 13:57 )  Alb: 4.3 g/dL / Pro: 7.2 g/dL / ALK PHOS: 42 U/L / ALT: 14 U/L / AST: 19 U/L / GGT: x           Coagulopathy   Lipid Panel   A1c   Cardiac enzymes CARDIAC MARKERS ( 10 Sep 2021 13:57 )  x     / x     / 77 U/L / x     / x          U/A Urinalysis Basic - ( 10 Sep 2021 14:02 )    Color: Light Yellow / Appearance: Clear / S.034 / pH: x  Gluc: x / Ketone: Negative  / Bili: Negative / Urobili: Negative   Blood: x / Protein: Trace / Nitrite: Negative   Leuk Esterase: Negative / RBC: 440 /hpf / WBC 15 /HPF   Sq Epi: x / Non Sq Epi: 6 /hpf / Bacteria: Negative      CSF  Immunological  Other    STUDIES & IMAGING:  Studies (EKG, EEG, EMG, etc):     Radiology (XR, CT, MR, U/S, TTE/KEIRA): HPI: 41 year old right-handed F with a PMH of antibody positive myasthenia gravis (binding/modulating Ab's, s/p thymectomy), NMO (on inebilizumab), and hypothyroidism who presented on 9/10 with two weeks of stabbing/sharp pain and worsening numbness in her left mid-back. Patient saw Dr. Resendiz on 9/3 and was given PO prednisone. Patient states that initially she felt electric shocks in the bottom of her feet as well as light shocks throughout her body. She then felt "light" numbness in a spot over the left mid-back that worsened over days and then moved to the center of her back. She then noticed a headache afterward, which eventually resolved. This morning, the patient woke up and felt that the numbness was much worse. Patient has residual band like feeling around her chest from initial NMO presentation. Patient has left torso discomfort that is intermittent over the past three days. Patient also endorses hot flashes, chills, and lip tingling. She had an episode of left arm numbness that lasted for one day as well. She denies any vision changes or weakness. She denies pain or difficulty with urination, cough, or sick contacts.       REVIEW OF SYSTEMS  A 10-system ROS was performed and is negative except for those items noted above and/or in the HPI.    PAST MEDICAL & SURGICAL HISTORY:  Hypothyroid    Anxiety    Myasthenia gravis    Neuromyelitis optica    MG status post thymectomy (myasthenia gravis)      History of appendectomy        FAMILY HISTORY:  No pertinent neurologic family history in first degree relatives      SOCIAL HISTORY:   T/E/D: denies, lives with significant other    MEDICATIONS (HOME):  Home Medications:    MEDICATIONS  (STANDING):    MEDICATIONS  (PRN):    ALLERGIES/INTOLERANCES:  Allergies  No Known Allergies    Intolerances    VITALS & EXAMINATION:  Vital Signs Last 24 Hrs  T(C): 36.9 (10 Sep 2021 12:24), Max: 36.9 (10 Sep 2021 12:24)  T(F): 98.5 (10 Sep 2021 12:24), Max: 98.5 (10 Sep 2021 12:24)  HR: 89 (10 Sep 2021 12:24) (89 - 89)  BP: 122/86 (10 Sep 2021 12:24) (122/86 - 122/86)  BP(mean): --  RR: 18 (10 Sep 2021 12:24) (18 - 18)  SpO2: 97% (10 Sep 2021 12:24) (97% - 97%)    General: Obese Female, appears stated age, in no apparent distress including pain  Eyes: clear conjunctiva  Respiratory: no respiratory distress, breathing comfortably on room air  Extremities: no calf tenderness to palpation  skin: no rashes  ext: no joint deformity  Vasc: no edema    Neurological (>12):  MS: Awake, alert, oriented to person, place, situation, time. Normal affect. Follows all commands.    Language: Speech is clear, fluent with good comprehension    CNs: PERRL (R = 3mm, L = 3mm), no APD. CVF full. EOMI, left beating torsional nystagmus on left gaze. V1-3 intact to LT b/l. No facial asymmetry b/l. Hearing grossly normal (rubbing fingers) b/l. Symmetric palate elevation in midline. Gag reflex deferred. Head turning & shoulder shrug intact b/l. Tongue midline, normal movements, no atrophy.    Fundoscopic: deferred    Motor: Normal muscle bulk & tone. No noticeable tremor. No pronator drift. Normal FFM b/l.               Deltoid	Biceps	Triceps	Wrist	Finger ABd	   R	5	5	5	5	5		5 	  L	5	5	5	5	5		5    	H-Flex	H-Ext	H-ABd	H-ADd	K-Flex	K-Ext	D-Flex	P-Flex  R	4+	5	-	-	5	5	5	5 	   L	5	5	-	-	5	5	5	5	     Sensation: ?absent to LT in b/l lower extremities. Intact to LT in bilateral upper extremities. 95% PP in LUE compared to RUE. intact to PP in b/l LE. Absent to temperature in b/l LE. JPS absent at toes in RLE, intact at ankle. JPS intact at toes in LLE. Vibration absent in RLE, intact at ASIS. Vibration decreased at toes in LLE.      Decreased sensation to PP at lower back in left paraspinal area, increased sensation to PP at lower back in right paraspinal area.     Cortical: Extinction on DSS (neglect): none    Reflexes:              Biceps(C5)       BR(C6)     Triceps(C7)               Patellar(L4)    Achilles(S1)    Plantar Resp  R	2	          2	             2		        3		    2		Down   L	2	          2	             2		        3		    2		Down     >10 beats of ankle clonus b/l     Coordination: No dysmetria to FTN/HTS    Gait: Normal Romberg. No postural instability. Normal stance, arm swing, and gait.     LABORATORY:  CBC                       12.8   9.77  )-----------( 114      ( 10 Sep 2021 13:57 )             40.1     Chem 09-10    142  |  105  |  16  ----------------------------<  108<H>  3.9   |  20<L>  |  0.53    Ca    9.4      10 Sep 2021 13:57    TPro  7.2  /  Alb  4.3  /  TBili  0.3  /  DBili  x   /  AST  19  /  ALT  14  /  AlkPhos  42  10    LFTs LIVER FUNCTIONS - ( 10 Sep 2021 13:57 )  Alb: 4.3 g/dL / Pro: 7.2 g/dL / ALK PHOS: 42 U/L / ALT: 14 U/L / AST: 19 U/L / GGT: x           Coagulopathy   Lipid Panel   A1c   Cardiac enzymes CARDIAC MARKERS ( 10 Sep 2021 13:57 )  x     / x     / 77 U/L / x     / x          U/A Urinalysis Basic - ( 10 Sep 2021 14:02 )    Color: Light Yellow / Appearance: Clear / S.034 / pH: x  Gluc: x / Ketone: Negative  / Bili: Negative / Urobili: Negative   Blood: x / Protein: Trace / Nitrite: Negative   Leuk Esterase: Negative / RBC: 440 /hpf / WBC 15 /HPF   Sq Epi: x / Non Sq Epi: 6 /hpf / Bacteria: Negative      CSF  Immunological  Other    STUDIES & IMAGING:  Studies (EKG, EEG, EMG, etc):     Radiology (XR, CT, MR, U/S, TTE/KEIRA):

## 2021-09-10 NOTE — ED ADULT NURSE REASSESSMENT NOTE - NS ED NURSE REASSESS COMMENT FT1
received pt in room 41, pt very argumentable pt does not want to stay in room pt spoke with many times that she has a roomin red the rooms in cdu do not exist. I personally swept floor of  e 2 rubber bands and moved  e lore can

## 2021-09-10 NOTE — CONSULT NOTE ADULT - ATTENDING COMMENTS
42 y/o woman with hx of NMO and cervical transverse myelitis, p/w midback spasm and numbness. MRI thoracic spine shows mall linear focus of chronic signal abnormality within the posterior cord from C4 to T1, unchanged and consistent with a chronic lesion. No enhancement is seen.  Abnormal signal/edema is seen within the thoracic cord at T3-T7 with associated cord expansion and new enhancement consistent with an acute large demyelinating plaque, increased from prior examination with new enhancement.   Pt received first dose of 1g solumedrol this morning while in CDU. Will plan to admit pt to neuro service. Pt has been following with Dr. Noemi Gale, and was going to be started on Soliris. She has not had her meningitis vaccine yet. Will consult ID for timing of vaccine and then plan for Soliris as outpatient. For now will do course of Solumedrol for 5 days.

## 2021-09-10 NOTE — ED CDU PROVIDER INITIAL DAY NOTE - PROGRESS NOTE DETAILS
CDU NOTE ROSANA Kingsley: spoke to MRI department, MRI scheduled for tomorrow morning. spoke with Neurology- ok with MRI scheduled for AM. Evaluated patient at bedside, states that she has numbness to her whole body, worsening on her face since coming to the ED. Also complains of worsening mid back pain. Neuro check: Alert and oriented. Endorses numbness to face/lower extremities. Endorses sensation to UE, although decreased LUE compared to RUE. Strength equal b/l UE/LE 5/5. Steady gait. EOMI. PERRL. Able to PO tolerate. Spoke with Neurology, discussed case and still recommending MRI prior to starting to steroids. Discussed with Dr. Desai. - Mirian Boyle PA-C

## 2021-09-11 ENCOUNTER — NON-APPOINTMENT (OUTPATIENT)
Age: 41
End: 2021-09-11

## 2021-09-11 DIAGNOSIS — R20.2 PARESTHESIA OF SKIN: ICD-10-CM

## 2021-09-11 LAB
GLUCOSE BLDC GLUCOMTR-MCNC: 136 MG/DL — HIGH (ref 70–99)
GLUCOSE BLDC GLUCOMTR-MCNC: 171 MG/DL — HIGH (ref 70–99)
GLUCOSE BLDC GLUCOMTR-MCNC: 187 MG/DL — HIGH (ref 70–99)

## 2021-09-11 PROCEDURE — 99223 1ST HOSP IP/OBS HIGH 75: CPT

## 2021-09-11 RX ORDER — SODIUM CHLORIDE 9 MG/ML
1000 INJECTION, SOLUTION INTRAVENOUS
Refills: 0 | Status: DISCONTINUED | OUTPATIENT
Start: 2021-09-11 | End: 2021-09-15

## 2021-09-11 RX ORDER — ACETAMINOPHEN 500 MG
650 TABLET ORAL ONCE
Refills: 0 | Status: COMPLETED | OUTPATIENT
Start: 2021-09-11 | End: 2021-09-12

## 2021-09-11 RX ORDER — PANTOPRAZOLE SODIUM 20 MG/1
40 TABLET, DELAYED RELEASE ORAL
Refills: 0 | Status: DISCONTINUED | OUTPATIENT
Start: 2021-09-11 | End: 2021-09-15

## 2021-09-11 RX ORDER — INSULIN LISPRO 100/ML
VIAL (ML) SUBCUTANEOUS AT BEDTIME
Refills: 0 | Status: DISCONTINUED | OUTPATIENT
Start: 2021-09-11 | End: 2021-09-15

## 2021-09-11 RX ORDER — DEXTROSE 50 % IN WATER 50 %
25 SYRINGE (ML) INTRAVENOUS ONCE
Refills: 0 | Status: DISCONTINUED | OUTPATIENT
Start: 2021-09-11 | End: 2021-09-15

## 2021-09-11 RX ORDER — DEXTROSE 50 % IN WATER 50 %
15 SYRINGE (ML) INTRAVENOUS ONCE
Refills: 0 | Status: DISCONTINUED | OUTPATIENT
Start: 2021-09-11 | End: 2021-09-15

## 2021-09-11 RX ORDER — DEXTROSE 50 % IN WATER 50 %
12.5 SYRINGE (ML) INTRAVENOUS ONCE
Refills: 0 | Status: DISCONTINUED | OUTPATIENT
Start: 2021-09-11 | End: 2021-09-15

## 2021-09-11 RX ORDER — ACETAMINOPHEN 500 MG
1000 TABLET ORAL ONCE
Refills: 0 | Status: COMPLETED | OUTPATIENT
Start: 2021-09-11 | End: 2021-09-11

## 2021-09-11 RX ORDER — GLUCAGON INJECTION, SOLUTION 0.5 MG/.1ML
1 INJECTION, SOLUTION SUBCUTANEOUS ONCE
Refills: 0 | Status: DISCONTINUED | OUTPATIENT
Start: 2021-09-11 | End: 2021-09-15

## 2021-09-11 RX ORDER — ENOXAPARIN SODIUM 100 MG/ML
40 INJECTION SUBCUTANEOUS DAILY
Refills: 0 | Status: DISCONTINUED | OUTPATIENT
Start: 2021-09-11 | End: 2021-09-15

## 2021-09-11 RX ORDER — KETOROLAC TROMETHAMINE 30 MG/ML
15 SYRINGE (ML) INJECTION ONCE
Refills: 0 | Status: DISCONTINUED | OUTPATIENT
Start: 2021-09-11 | End: 2021-09-11

## 2021-09-11 RX ORDER — INSULIN LISPRO 100/ML
VIAL (ML) SUBCUTANEOUS
Refills: 0 | Status: DISCONTINUED | OUTPATIENT
Start: 2021-09-11 | End: 2021-09-15

## 2021-09-11 RX ORDER — LIDOCAINE 4 G/100G
1 CREAM TOPICAL ONCE
Refills: 0 | Status: COMPLETED | OUTPATIENT
Start: 2021-09-11 | End: 2021-09-11

## 2021-09-11 RX ADMIN — Medication 58 MILLIGRAM(S): at 08:27

## 2021-09-11 RX ADMIN — Medication 25 MICROGRAM(S): at 06:28

## 2021-09-11 RX ADMIN — LIDOCAINE 1 PATCH: 4 CREAM TOPICAL at 00:47

## 2021-09-11 RX ADMIN — LIDOCAINE 1 PATCH: 4 CREAM TOPICAL at 17:01

## 2021-09-11 RX ADMIN — LIDOCAINE 1 PATCH: 4 CREAM TOPICAL at 00:27

## 2021-09-11 RX ADMIN — ENOXAPARIN SODIUM 40 MILLIGRAM(S): 100 INJECTION SUBCUTANEOUS at 11:49

## 2021-09-11 RX ADMIN — Medication 15 MILLIGRAM(S): at 00:43

## 2021-09-11 RX ADMIN — Medication 150 MILLIGRAM(S): at 10:58

## 2021-09-11 RX ADMIN — Medication 400 MILLIGRAM(S): at 07:37

## 2021-09-11 NOTE — ED CDU PROVIDER SUBSEQUENT DAY NOTE - HISTORY
No interval changes since initial CDU provider note. Pt went to MRI and returned complaining of back pain. Toradol and Lidoderm patch given. Neuro exam unchanged: Alert and oriented. Endorses numbness to face/lower extremities. Endorses sensation to UE, although decreased LUE compared to RUE. Strength grossly equal b/l UE/LE 5/5. Steady gait. EOMI. PERRL.   Pending MRI result in the setting of numbness and back pain. Neuro following. - ROSANA Boyle

## 2021-09-11 NOTE — H&P ADULT - HISTORY OF PRESENT ILLNESS
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 Patient saw Dr. Resendiz on 9/3 and was given PO prednisone. Patient states that initially she felt electric shocks in the bottom of her feet as well as light shocks throughout her body. She then felt "light" numbness in a spot over the left mid-back that worsened over days and then moved to the center of her back. She then noticed a headache afterward, which eventually resolved. This morning, the patient woke up and felt that the numbness was much worse. Patient has residual band like feeling around her chest from initial NMO presentation. Patient has left torso discomfort that is intermittent over the past three days. Patient also endorses hot flashes, chills, and lip tingling. She had an episode of left arm numbness that lasted for one day as well. She denies any vision changes or weakness. She denies pain or difficulty with urination, cough, or sick contacts.

## 2021-09-11 NOTE — ED CDU PROVIDER SUBSEQUENT DAY NOTE - ATTENDING CONTRIBUTION TO CARE
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 Denies any headache currently or optho symptoms. Pt appears annoyed, uncomfortable, sitting in chair in mild distress from pain, nontoxic appearing.     Pt may benefit from neuropathic agents, did try Neurontin in past with minimal relief, will trial Lyrica.  Also receiving high-dose steroids.  Awaiting MRI report and Neuro recc.  Pt feels this episode is worse than July and feels she would benefit from steroids, which helped her last time.  Offered but pt declined opiates.

## 2021-09-11 NOTE — ED CDU PROVIDER SUBSEQUENT DAY NOTE - PHYSICAL EXAMINATION
GEN: Well Appearing, Nontoxic, NAD  HEENT: NC/AT, Symm Facies. PERRL, EOMI  CV: No JVD/Bruits or stridor;  +S1S2, RRR w/o m/g/r  RESP: CTAB w/o w/r/r  ABD: Soft, nt/nd  EXT/MSK: No lower extremity edema or calf tenderness.FROMx4  SKIN: No visible erythema, lesions or rash  Neuro: Alert and oriented. Endorses numbness to face/lower extremities. Endorses sensation to UE, although decreased LUE compared to RUE. Strength grossly equal b/l UE/LE 5/5. Steady gait.

## 2021-09-11 NOTE — H&P ADULT - ASSESSMENT
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 Initial vital signs unrevealing. Physical exam shows decreased sensation in the lower extremities (R>L) and sustained ankle clonus b/l. Labs significant for platelets of 114 and CRP of 6. MRI C and T spine showing acute large T3-T7 demyelinating plaque.    Impression: Sharp/stabbing mid-thoracic back pain and worsening mid-thoracic back numbness possibly localizing to cervical or thoracic spine of unclear etiology, possibly NMO exacerbation    Plan:  - Admit to [facility] under Dr. Jessica Ames    Medications:  - IV solumedrol 1 g daily for 3 days (with GI prophylaxis, [facility])    Labs:  -F/u infectious workup, ESR    Other:  Outpatient neurology follow up with Dr. Resendiz.  Neurochecks    Case discussed with patient's neurologist, Dr. Resendiz, and neurology attending, Dr. Ames 41 year old right-handed F with a PMH of antibody positive myasthenia gravis (binding/modulating Ab's, s/p thymectomy), NMO (on inebilizumab), and hypothyroidism who presented on 9/10 with two weeks of stabbing/sharp pain and worsening numbness in her left mid-back. Initial vital signs unrevealing. Physical exam shows decreased sensation in the lower extremities (R>L) and sustained ankle clonus b/l. Labs significant for platelets of 114 and CRP of 6. MRI C and T spine showing acute large T3-T7 demyelinating plaque.    Impression: Sharp/stabbing mid-thoracic back pain and worsening mid-thoracic back numbness possibly localizing to cervical or thoracic spine of unclear etiology, found to have T3-T7 acute demyelinating plaque on MRI, likely NMO exacerbation    Plan:  - Admit to [facility] under Dr. Jessica Ames    Medications:  - IV solumedrol 1 g daily for 3 days (with GI prophylaxis, [facility])  - start eculizumab, discussed with Dr. Resendiz    Labs:  -F/u infectious workup: no leukocytosis, no fever, UA neg, ESR 14  -f/u CXR    Other:  Outpatient neurology follow up with Dr. Resendiz.  Neurochecks    Case discussed with patient's neurologist, Dr. Resendiz, and neurology attending, Dr. Ames 41 year old right-handed F with a PMH of antibody positive myasthenia gravis (binding/modulating Ab's, s/p thymectomy), NMO (on inebilizumab), and hypothyroidism who presented on 9/10 with two weeks of stabbing/sharp pain and worsening numbness in her left mid-back. Initial vital signs unrevealing. Physical exam shows decreased sensation in the lower extremities (R>L) and sustained ankle clonus b/l. Labs significant for platelets of 114 and CRP of 6. MRI C and T spine showing acute large T3-T7 demyelinating plaque.    Impression: Sharp/stabbing mid-thoracic back pain and worsening mid-thoracic back numbness possibly localizing to cervical or thoracic spine of unclear etiology, found to have T3-T7 acute demyelinating plaque on MRI, likely NMO exacerbation    Plan:  - Admit to [facility] under Dr. Jessica Ames    Medications:  - IV solumedrol 1 g daily for 3 days (with GI prophylaxis, [facility])  - start eculizumab, discussed with Dr. Resendiz    Labs:  -F/u infectious workup: no leukocytosis, no fever, UA neg, MR spine with no evidence of PNA, ESR 14    Other:  Outpatient neurology follow up with Dr. Resendiz.  Neurochecks    Case discussed with patient's neurologist, Dr. Resendiz, and neurology attending, Dr. Ames

## 2021-09-11 NOTE — H&P ADULT - NSHPLABSRESULTS_GEN_ALL_CORE
CBC                       12.8   9.77  )-----------( 114      ( 10 Sep 2021 13:57 )             40.1     Chem 09-10    142  |  105  |  16  ----------------------------<  108<H>  3.9   |  20<L>  |  0.53    Ca    9.4      10 Sep 2021 13:57    TPro  7.2  /  Alb  4.3  /  TBili  0.3  /  DBili  x   /  AST  19  /  ALT  14  /  AlkPhos  42  09-10    LFTs LIVER FUNCTIONS - ( 10 Sep 2021 13:57 )  Alb: 4.3 g/dL / Pro: 7.2 g/dL / ALK PHOS: 42 U/L / ALT: 14 U/L / AST: 19 U/L / GGT: x           Coagulopathy   Lipid Panel   A1c   Cardiac enzymes CARDIAC MARKERS ( 10 Sep 2021 13:57 )  x     / x     / 77 U/L / x     / x          U/A Urinalysis Basic - ( 10 Sep 2021 14:02 )    Color: Light Yellow / Appearance: Clear / S.034 / pH: x  Gluc: x / Ketone: Negative  / Bili: Negative / Urobili: Negative   Blood: x / Protein: Trace / Nitrite: Negative   Leuk Esterase: Negative / RBC: 440 /hpf / WBC 15 /HPF   Sq Epi: x / Non Sq Epi: 6 /hpf / Bacteria: Negative    STUDIES & IMAGING:  Studies (EKG, EEG, EMG, etc):     Radiology (XR, CT, MR, U/S, TTE/KEIRA):  < from: MR Cervical Spine w/wo IV Cont (09.10.21 @ 22:29) >    IMPRESSION:   small linear focus of chronic signal abnormality within the posterior cord from C4 to T1, unchanged and consistent with a chronic lesion. No enhancement is seen.  Abnormal signal/edema is seen within the thoracic cord at T3-T7 with associated cord expansion and new enhancement consistent with an acute large demyelinating plaque, increased from prior examination with new enhancement.    Degenerative spondylosis as described.    < end of copied text >

## 2021-09-11 NOTE — H&P ADULT - NSHPPHYSICALEXAM_GEN_ALL_CORE
T(C): 36.9 (10 Sep 2021 12:24), Max: 36.9 (10 Sep 2021 12:24)  T(F): 98.5 (10 Sep 2021 12:24), Max: 98.5 (10 Sep 2021 12:24)  HR: 89 (10 Sep 2021 12:24) (89 - 89)  BP: 122/86 (10 Sep 2021 12:24) (122/86 - 122/86)  RR: 18 (10 Sep 2021 12:24) (18 - 18)  SpO2: 97% (10 Sep 2021 12:24) (97% - 97%)    General: Obese Female, appears stated age, in no apparent distress including pain  Eyes: clear conjunctiva  Respiratory: no respiratory distress, breathing comfortably on room air  Extremities: no calf tenderness to palpation    Neurological (>12):  MS: Awake, alert, oriented to person, place, situation, time. Normal affect. Follows all commands.  Language: Speech is clear, fluent with good comprehension    CNs: PERRL (R = 3mm, L = 3mm), no APD. CVF full. EOMI, left beating torsional nystagmus on left gaze. V1-3 intact to LT b/l. No facial asymmetry b/l. Hearing grossly normal (rubbing fingers) b/l. Symmetric palate elevation in midline. Gag reflex deferred. Head turning & shoulder shrug intact b/l. Tongue midline, normal movements, no atrophy.    Motor: Normal muscle bulk & tone. No noticeable tremor. No pronator drift. Normal FFM b/l.               Deltoid	Biceps	Triceps	Wrist	Finger ABd	   R	5	5	5	5	5		5 	  L	5	5	5	5	5		5    	H-Flex	H-Ext	H-ABd	H-ADd	K-Flex	K-Ext	D-Flex	P-Flex  R	4+	5	-	-	5	5	5	5 	   L	5	5	-	-	5	5	5	5	     Sensation: ?absent to LT in b/l lower extremities. Intact to LT in bilateral upper extremities. 95% PP in LUE compared to RUE. intact to PP in b/l LE. Absent to temperature in b/l LE. JPS absent at toes in RLE, intact at ankle. JPS intact at toes in LLE. Vibration absent in RLE, intact at ASIS. Vibration decreased at toes in LLE.      Decreased sensation to PP at lower back in left paraspinal area, increased sensation to PP at lower back in right paraspinal area.   Cortical: Extinction on DSS (neglect): none    Reflexes: >10 beats of ankle clonus b/l               Biceps(C5)       BR(C6)     Triceps(C7)               Patellar(L4)    Achilles(S1)    Plantar Resp  R	2	          2	             2		        3		    2		Down   L	2	          2	             2		        3		    2		Down     Coordination: No dysmetria to FTN/HTS  Gait: Normal Romberg. No postural instability. Normal stance, arm swing, and gait.

## 2021-09-11 NOTE — ED CDU PROVIDER SUBSEQUENT DAY NOTE - NS ED ROS FT
Constitutional: No fever or chills  Eyes: No visual changes, eye pain  CV: No chest pain or lower extremity edema  Resp: No SOB no cough  GI: No abd pain. No nausea or vomiting. No diarrhea.  : No dysuria, hematuria.   MSK: +back pain  Skin: No rash  Psych: No complaints   Neuro: No headache. +numbness No change in gait

## 2021-09-11 NOTE — ED ADULT NURSE REASSESSMENT NOTE - NS ED NURSE REASSESS COMMENT FT1
Pt has bed in 4 [facility] [address] Door Report given to Ishaan Crowell  . Pt is Resting on the stretcher   at this time looks comfortable Stable to got to floor

## 2021-09-11 NOTE — ED ADULT NURSE REASSESSMENT NOTE - NS ED NURSE REASSESS COMMENT FT1
07.30 Received the Pt from  [facility]  Pt is Observed for Back pain  awaiting MRI results . Received the Pt A&OX 4 obeys commands Allyn N/V/D fever chills cp SOB   Comfort care & safety measures continued  IV site looks clean & dry no signs of infiltration noted pt denies  pain IV site .  Pt is advised to call for help  call bell with in the reach pt verbalized the understanding . Pt states  she has chronic generalized  back pain in 8/10 medicated as per order pending CDU  MD villavicencio . GCS 15/15 A&OX 4 PERRLA  size 3 Strong upper & lower extremities steady gait  pt is ambulatory  without support  No facial droop  No Hand Leg drop denies numbness tingling Continue to monitor

## 2021-09-11 NOTE — ED CDU PROVIDER SUBSEQUENT DAY NOTE - PROGRESS NOTE DETAILS
Patient went to the bathroom and states that now every part of her body is numb making it more difficulty to use the bathroom. She asked for IV steroids. I explained that we were waiting for MRI results and neuro reccs pending result. I spoke with neurology who states that he will come and reevaluate patient. - Mirian Boyle PA-C Neuro at bedside. - Mirian Boyle PA-C Neuro at bedside. States that he spoke with patient. No acute recommendations at this time.  - Mirian Byole PA-C Neuro called recommending 1gram of Solumedrol at this time. States that patient will be likely admitted to their service but will call back after speaking with team. - Mirian Boyle PA-C Patient seen and evaluated at bedside with Dr. Sloan. Reports nothing is helping her pain/discomfort.  Declining any stronger pain meds/opiates. Amenable to try lyrica. VSS. Pt ambulatory in CDU hallway. MRI results still pending. D/w neuro team, no plan for admission at this time, will be rounding on patient later. Patient seen by neuro attending, MRI results reviewed, and plan for admission. D/w Dr. Sloan.

## 2021-09-11 NOTE — ED CDU PROVIDER SUBSEQUENT DAY NOTE - MEDICAL DECISION MAKING DETAILS
Dr. Sloan Note: neuromyelitis optima flare-up with neuropathic pain of back and reported weakness subjective, for MRI and pain/neuro sxs control.  Steroids, neuropathic pain meds, neuro consult.

## 2021-09-12 LAB
COVID-19 SPIKE DOMAIN AB INTERP: NEGATIVE — SIGNIFICANT CHANGE UP
COVID-19 SPIKE DOMAIN ANTIBODY RESULT: 0.74 U/ML — SIGNIFICANT CHANGE UP
GLUCOSE BLDC GLUCOMTR-MCNC: 111 MG/DL — HIGH (ref 70–99)
GLUCOSE BLDC GLUCOMTR-MCNC: 123 MG/DL — HIGH (ref 70–99)
SARS-COV-2 IGG+IGM SERPL QL IA: 0.74 U/ML — SIGNIFICANT CHANGE UP
SARS-COV-2 IGG+IGM SERPL QL IA: NEGATIVE — SIGNIFICANT CHANGE UP

## 2021-09-12 PROCEDURE — 99232 SBSQ HOSP IP/OBS MODERATE 35: CPT

## 2021-09-12 RX ORDER — CHOLECALCIFEROL (VITAMIN D3) 125 MCG
2000 CAPSULE ORAL
Refills: 0 | Status: DISCONTINUED | OUTPATIENT
Start: 2021-09-12 | End: 2021-09-15

## 2021-09-12 RX ORDER — SENNA PLUS 8.6 MG/1
2 TABLET ORAL DAILY
Refills: 0 | Status: DISCONTINUED | OUTPATIENT
Start: 2021-09-12 | End: 2021-09-15

## 2021-09-12 RX ORDER — POLYETHYLENE GLYCOL 3350 17 G/17G
17 POWDER, FOR SOLUTION ORAL DAILY
Refills: 0 | Status: DISCONTINUED | OUTPATIENT
Start: 2021-09-12 | End: 2021-09-15

## 2021-09-12 RX ADMIN — Medication 25 MICROGRAM(S): at 08:13

## 2021-09-12 RX ADMIN — ENOXAPARIN SODIUM 40 MILLIGRAM(S): 100 INJECTION SUBCUTANEOUS at 14:31

## 2021-09-12 RX ADMIN — PANTOPRAZOLE SODIUM 40 MILLIGRAM(S): 20 TABLET, DELAYED RELEASE ORAL at 08:13

## 2021-09-12 RX ADMIN — SENNA PLUS 2 TABLET(S): 8.6 TABLET ORAL at 10:32

## 2021-09-12 RX ADMIN — Medication 58 MILLIGRAM(S): at 18:32

## 2021-09-12 RX ADMIN — Medication 650 MILLIGRAM(S): at 01:41

## 2021-09-12 RX ADMIN — Medication 2000 UNIT(S): at 17:39

## 2021-09-12 RX ADMIN — Medication 50 MILLIGRAM(S): at 17:39

## 2021-09-12 RX ADMIN — Medication 1 TABLET(S): at 14:32

## 2021-09-12 NOTE — CHART NOTE - NSCHARTNOTEFT_GEN_A_CORE
ID brief note    this is not a consult note, I did not see the pt.  neurolog called me, that the pt is getting high dose steroid for 5 days now for NMO flare  they are planning to give eculizumab after a few weeks later in the clinic  I recommend the pt to get an early appointment with ID to evaluate for meningitis shot there before pt gets eculizumab    Elroy Ayala MD, PGY5  ID fellow  Pager: [phone]  [facility] pager ID: [phone] (would prefer to text page for any new consult or question, please include name/location and best call back number)  After 5pm/weekends call [phone]

## 2021-09-12 NOTE — PROGRESS NOTE ADULT - SUBJECTIVE AND OBJECTIVE BOX
INTERVAL HISTORY:    Patient interviewed and examined at the bedside on the morning of 21. No acute overnight events    PAST MEDICAL & SURGICAL HISTORY:  Hypothyroid    Anxiety    Myasthenia gravis    Neuromyelitis optica    MG status post thymectomy (myasthenia gravis)      History of appendectomy        FAMILY HISTORY:  No pertinent family history in first degree relatives      SOCIAL HISTORY:   T/E/D:   Occupation:   Lives with:     MEDICATIONS (HOME):  Home Medications:  predniSONE 20 mg oral tablet: 1 tab(s) orally once a day (started 3 days ago) (11 Sep 2021 12:50)  Vitamin D2 50 mcg (2000 intl units) oral capsule: 2 cap(s) orally once a day (11 Sep 2021 12:50)    MEDICATIONS  (STANDING):  bisacodyl Suppository 10 milliGRAM(s) Rectal once  dextrose 40% Gel 15 Gram(s) Oral once  dextrose 5%. 1000 milliLiter(s) (50 mL/Hr) IV Continuous <Continuous>  dextrose 5%. 1000 milliLiter(s) (100 mL/Hr) IV Continuous <Continuous>  dextrose 50% Injectable 25 Gram(s) IV Push once  dextrose 50% Injectable 12.5 Gram(s) IV Push once  dextrose 50% Injectable 25 Gram(s) IV Push once  enoxaparin Injectable 40 milliGRAM(s) SubCutaneous daily  glucagon  Injectable 1 milliGRAM(s) IntraMuscular once  insulin lispro (ADMELOG) corrective regimen sliding scale   SubCutaneous three times a day before meals  insulin lispro (ADMELOG) corrective regimen sliding scale   SubCutaneous at bedtime  levothyroxine 25 MICROGram(s) Oral daily  pantoprazole    Tablet 40 milliGRAM(s) Oral before breakfast    MEDICATIONS  (PRN):    ALLERGIES/INTOLERANCES:  Allergies  No Known Allergies    Intolerances    VITALS & EXAMINATION:  Vital Signs Last 24 Hrs  T(C): 36.8 (12 Sep 2021 07:52), Max: 36.8 (11 Sep 2021 11:24)  T(F): 98.3 (12 Sep 2021 07:52), Max: 98.3 (11 Sep 2021 14:28)  HR: 81 (12 Sep 2021 07:52) (77 - 106)  BP: 114/75 (12 Sep 2021 07:52) (99/66 - 123/82)  BP(mean): --  RR: 18 (12 Sep 2021 07:52) (16 - 20)  SpO2: 98% (12 Sep 2021 07:52) (95% - 100%)    General: Obese Female, appears stated age, in no apparent distress including pain  Eyes: clear conjunctiva  Respiratory: no respiratory distress, breathing comfortably on room air  Extremities: no calf tenderness to palpation    Neurological (>12):  MS: Awake, alert, oriented to person, place, situation, time. Normal affect. Follows all commands.  Language: Speech is clear, fluent with good comprehension    CNs: PERRL (R = 3mm, L = 3mm), no APD. CVF full. EOMI, left beating torsional nystagmus on left gaze. V1-3 intact to LT b/l. No facial asymmetry b/l. Hearing grossly normal (rubbing fingers) b/l. Symmetric palate elevation in midline. Gag reflex deferred. Head turning & shoulder shrug intact b/l. Tongue midline, normal movements, no atrophy.    Motor: Normal muscle bulk & tone. No noticeable tremor. No pronator drift. Normal FFM b/l.               Deltoid	Biceps	Triceps	Wrist	Finger ABd	   R	5	5	5	5	5		5 	  L	5	5	5	5	5		5    	H-Flex	H-Ext	H-ABd	H-ADd	K-Flex	K-Ext	D-Flex	P-Flex  R	4+	5	-	-	5	5	5	5 	   L	5	5	-	-	5	5	5	5	     Sensation: ?absent to LT in b/l lower extremities. Intact to LT in bilateral upper extremities. 95% PP in LUE compared to RUE. intact to PP in b/l LE. Absent to temperature in b/l LE. JPS absent at toes in RLE, intact at ankle. JPS intact at toes in LLE. Vibration absent in RLE, intact at ASIS. Vibration decreased at toes in LLE.      Decreased sensation to PP at lower back in left paraspinal area, increased sensation to PP at lower back in right paraspinal area.   Cortical: Extinction on DSS (neglect): none    Reflexes: >10 beats of ankle clonus b/l               Biceps(C5)       BR(C6)     Triceps(C7)               Patellar(L4)    Achilles(S1)    Plantar Resp  R	2	          2	             2		        3		    2		Down   L	2	          2	             2		        3		    2		Down     Coordination: No dysmetria to FTN/HTS  Gait: Normal Romberg. No postural instability. Normal stance, arm swing, and gait.    LABORATORY:  CBC                       12.8   9.77  )-----------( 114      ( 10 Sep 2021 13:57 )             40.1     Chem 09-10    142  |  105  |  16  ----------------------------<  108<H>  3.9   |  20<L>  |  0.53    Ca    9.4      10 Sep 2021 13:57    TPro  7.2  /  Alb  4.3  /  TBili  0.3  /  DBili  x   /  AST  19  /  ALT  14  /  AlkPhos  42  09-10    LFTs LIVER FUNCTIONS - ( 10 Sep 2021 13:57 )  Alb: 4.3 g/dL / Pro: 7.2 g/dL / ALK PHOS: 42 U/L / ALT: 14 U/L / AST: 19 U/L / GGT: x           Coagulopathy   Lipid Panel   A1c   Cardiac enzymes CARDIAC MARKERS ( 10 Sep 2021 13:57 )  x     / x     / 77 U/L / x     / x          U/A Urinalysis Basic - ( 10 Sep 2021 14:02 )    Color: Light Yellow / Appearance: Clear / S.034 / pH: x  Gluc: x / Ketone: Negative  / Bili: Negative / Urobili: Negative   Blood: x / Protein: Trace / Nitrite: Negative   Leuk Esterase: Negative / RBC: 440 /hpf / WBC 15 /HPF   Sq Epi: x / Non Sq Epi: 6 /hpf / Bacteria: Negative      Radiology (XR, CT, MR, U/S, TTE/KEIRA):      MR Cervical Spine w/wo IV Cont (09.10.21 @ 22:29)     IMPRESSION:   small linear focus of chronic signal abnormality within the posterior cord from C4 to T1, unchanged and consistent with a chronic lesion. No enhancement is seen.  Abnormal signal/edema is seen within the thoracic cord at T3-T7 with associated cord expansion and new enhancement consistent with an acute large demyelinating plaque, increased from prior examination with new enhancement.    Degenerative spondylosis as described.

## 2021-09-12 NOTE — PROGRESS NOTE ADULT - ASSESSMENT
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 Initial vital signs unrevealing. Physical exam shows decreased sensation in the lower extremities (R>L) and sustained ankle clonus b/l. Labs significant for platelets of 114 and CRP of 6. MRI C and T spine showing acute large T3-T7 demyelinating plaque.    Impression: Sharp/stabbing mid-thoracic back pain and worsening mid-thoracic back numbness possibly localizing to cervical or thoracic spine of unclear etiology, found to have T3-T7 acute demyelinating plaque on MRI, likely NMO exacerbation    Plan:  - Admitted to [facility] under Dr. Jessica Ames on 9/11/21    Medications:  - IV solumedrol 1 g daily for 3 days (with GI prophylaxis, [facility]) (Day 2/3)  - start eculizumab, discussed with Dr. Resendiz    Labs:  -F/u infectious workup: no leukocytosis, no fever, UA neg, MR spine with no evidence of PNA, ESR 14    Other:  Outpatient neurology follow up with Dr[facility] Resendiz.  [facility]   41 year old right-handed F with a PMH of antibody positive myasthenia gravis (binding/modulating Ab's, s/p thymectomy), NMO (on inebilizumab), and hypothyroidism who presented on 9/10 with two weeks of stabbing/sharp pain and worsening numbness in her left mid-back. Initial vital signs unrevealing. Physical exam shows decreased sensation in the lower extremities (R>L) and sustained ankle clonus b/l. Labs significant for platelets of 114 and CRP of 6. MRI C and T spine showing acute large T3-T7 demyelinating plaque.    Impression: Sharp/stabbing mid-thoracic back pain and worsening mid-thoracic back numbness possibly localizing to cervical or thoracic spine of unclear etiology, found to have T3-T7 acute demyelinating plaque on MRI, likely NMO exacerbation    Plan:  - Admitted to [facility] under Dr. Jessica Ames on 9/11/21    Medications:  - IV solumedrol 1 g daily for 3 days (with GI prophylaxis, [facility]) (Day 2/3)  - Began Lyrica 50mg bid po on 9/12/21  - start eculizumab, discussed with Dr. Resendiz in outpatient setting  - Attend ID clinic appointment for Meningitis vaccine prior to starting Eculizumab  - Restarted Vitamin D and B complex  - Increased Bowel regiment (Senna, Miralax, Dulcolax)    Labs:  -F/u infectious workup: no leukocytosis, no fever, UA neg, MR spine with no evidence of PNA, ESR 14    Other:  Outpatient neurology follow up with Dr. Resendiz.  [facility]

## 2021-09-13 LAB
GLUCOSE BLDC GLUCOMTR-MCNC: 136 MG/DL — HIGH (ref 70–99)
GLUCOSE BLDC GLUCOMTR-MCNC: 141 MG/DL — HIGH (ref 70–99)
GLUCOSE BLDC GLUCOMTR-MCNC: 153 MG/DL — HIGH (ref 70–99)
GLUCOSE BLDC GLUCOMTR-MCNC: 192 MG/DL — HIGH (ref 70–99)

## 2021-09-13 PROCEDURE — 99233 SBSQ HOSP IP/OBS HIGH 50: CPT | Mod: GC

## 2021-09-13 RX ADMIN — Medication 50 MILLIGRAM(S): at 05:26

## 2021-09-13 RX ADMIN — PANTOPRAZOLE SODIUM 40 MILLIGRAM(S): 20 TABLET, DELAYED RELEASE ORAL at 05:27

## 2021-09-13 RX ADMIN — Medication 1 TABLET(S): at 12:56

## 2021-09-13 RX ADMIN — SENNA PLUS 2 TABLET(S): 8.6 TABLET ORAL at 12:56

## 2021-09-13 RX ADMIN — Medication 25 MICROGRAM(S): at 05:27

## 2021-09-13 RX ADMIN — Medication 2000 UNIT(S): at 17:15

## 2021-09-13 RX ADMIN — Medication 50 MILLIGRAM(S): at 17:16

## 2021-09-13 RX ADMIN — ENOXAPARIN SODIUM 40 MILLIGRAM(S): 100 INJECTION SUBCUTANEOUS at 12:57

## 2021-09-13 RX ADMIN — Medication 1: at 17:00

## 2021-09-13 RX ADMIN — Medication 58 MILLIGRAM(S): at 12:57

## 2021-09-13 RX ADMIN — Medication 2000 UNIT(S): at 07:41

## 2021-09-13 NOTE — PROGRESS NOTE ADULT - SUBJECTIVE AND OBJECTIVE BOX
INTERVAL HISTORY:    Patient interviewed and examined at the bedside on the morning of 21. No acute overnight events    PAST MEDICAL & SURGICAL HISTORY:  Hypothyroid    Anxiety    Myasthenia gravis    Neuromyelitis optica    MG status post thymectomy (myasthenia gravis)      History of appendectomy        FAMILY HISTORY:  No pertinent family history in first degree relatives      SOCIAL HISTORY:   T/E/D:   Occupation:   Lives with:     MEDICATIONS (HOME):  Home Medications:  predniSONE 20 mg oral tablet: 1 tab(s) orally once a day (started 3 days ago) (11 Sep 2021 12:50)  Vitamin D2 50 mcg (2000 intl units) oral capsule: 2 cap(s) orally once a day (11 Sep 2021 12:50)    MEDICATIONS  (STANDING):  bisacodyl Suppository 10 milliGRAM(s) Rectal once  dextrose 40% Gel 15 Gram(s) Oral once  dextrose 5%. 1000 milliLiter(s) (50 mL/Hr) IV Continuous <Continuous>  dextrose 5%. 1000 milliLiter(s) (100 mL/Hr) IV Continuous <Continuous>  dextrose 50% Injectable 25 Gram(s) IV Push once  dextrose 50% Injectable 12.5 Gram(s) IV Push once  dextrose 50% Injectable 25 Gram(s) IV Push once  enoxaparin Injectable 40 milliGRAM(s) SubCutaneous daily  glucagon  Injectable 1 milliGRAM(s) IntraMuscular once  insulin lispro (ADMELOG) corrective regimen sliding scale   SubCutaneous three times a day before meals  insulin lispro (ADMELOG) corrective regimen sliding scale   SubCutaneous at bedtime  levothyroxine 25 MICROGram(s) Oral daily  pantoprazole    Tablet 40 milliGRAM(s) Oral before breakfast    MEDICATIONS  (PRN):    ALLERGIES/INTOLERANCES:  Allergies  No Known Allergies    Intolerances    VITALS & EXAMINATION:  Vital Signs Last 24 Hrs  T(C): 36.8 (13 Sep 2021 11:58), Max: 37 (12 Sep 2021 23:30)  T(F): 98.2 (13 Sep 2021 11:58), Max: 98.6 (12 Sep 2021 23:30)  HR: 88 (13 Sep 2021 11:58) (78 - 88)  BP: 120/78 (13 Sep 2021 11:58) (102/65 - 120/78)  BP(mean): --  RR: 16 (13 Sep 2021 11:58) (16 - 18)  SpO2: 97% (13 Sep 2021 11:58) (96% - 98%)    General: Obese Female, appears stated age, in no apparent distress including pain  Eyes: clear conjunctiva  Respiratory: no respiratory distress, breathing comfortably on room air  Extremities: no calf tenderness to palpation    Neurological (>12):  MS: Awake, alert, oriented to person, place, situation, time. Normal affect. Follows all commands.  Language: Speech is clear, fluent with good comprehension    CNs: PERRL (R = 3mm, L = 3mm), no APD. CVF full. EOMI. V1-3 intact to LT b/l. No facial asymmetry b/l. Hearing grossly normal to conversation. Gag reflex deferred. Head turning & shoulder shrug intact b/l. Tongue midline, normal movements, no atrophy.    Motor: Normal muscle bulk & tone. No noticeable tremor. No pronator drift.               Deltoid	Biceps	Triceps	Wrist	   R	5	5	5	5	5		 	  L	5	5	5	5	5		    	H-Flex	H-Ext	H-ABd	H-ADd	K-Flex	K-Ext	D-Flex	P-Flex  R	5	5	-	-	5	5	5	5 	   L	5	5	-	-	5	5	5	5	     Sensation: PP intact equally b/l in LE's. Decreased to PP in the T5-6 dermatome.     Decreased sensation to PP and LT at lower back in left paraspinal area, intact sensation to PP and LT at lower back in right paraspinal area.   Cortical: Extinction on DSS (neglect): none    Reflexes: >10 beats of ankle clonus b/l               Biceps(C5)       BR(C6)     Triceps(C7)               Patellar(L4)    Achilles(S1)    Plantar Resp  R	2	          3	             3		        3		    2		Down   L	2	          3	             3		        3		    2		Down     Coordination: No dysmetria to FTN  Gait: No postural instability. Normal stance, arm swing, and gait.    LABORATORY:        U/A Urinalysis Basic - ( 10 Sep 2021 14:02 )    Color: Light Yellow / Appearance: Clear / S.034 / pH: x  Gluc: x / Ketone: Negative  / Bili: Negative / Urobili: Negative   Blood: x / Protein: Trace / Nitrite: Negative   Leuk Esterase: Negative / RBC: 440 /hpf / WBC 15 /HPF   Sq Epi: x / Non Sq Epi: 6 /hpf / Bacteria: Negative      Radiology (XR, CT, MR, U/S, TTE/KEIRA):      MR Cervical Spine w/wo IV Cont (09.10.21 @ 22:29)     IMPRESSION:   small linear focus of chronic signal abnormality within the posterior cord from C4 to T1, unchanged and consistent with a chronic lesion. No enhancement is seen.  Abnormal signal/edema is seen within the thoracic cord at T3-T7 with associated cord expansion and new enhancement consistent with an acute large demyelinating plaque, increased from prior examination with new enhancement.    Degenerative spondylosis as described.     INTERVAL HISTORY:    Patient interviewed and examined at the bedside on the morning of 21. No acute overnight events however still reporting numbness, abnormal sensations on her bilateral sides of trunk and mid back down to her feet bilaterally. No muscle weakness reported     PAST MEDICAL & SURGICAL HISTORY:  Hypothyroid    Anxiety    Myasthenia gravis    Neuromyelitis optica    MG status post thymectomy (myasthenia gravis)      History of appendectomy        FAMILY HISTORY:  No pertinent family history in first degree relatives      SOCIAL HISTORY:   T/E/D:   Occupation:   Lives with:     MEDICATIONS (HOME):  Home Medications:  predniSONE 20 mg oral tablet: 1 tab(s) orally once a day (started 3 days ago) (11 Sep 2021 12:50)  Vitamin D2 50 mcg (2000 intl units) oral capsule: 2 cap(s) orally once a day (11 Sep 2021 12:50)    MEDICATIONS  (STANDING):  bisacodyl Suppository 10 milliGRAM(s) Rectal once  dextrose 40% Gel 15 Gram(s) Oral once  dextrose 5%. 1000 milliLiter(s) (50 mL/Hr) IV Continuous <Continuous>  dextrose 5%. 1000 milliLiter(s) (100 mL/Hr) IV Continuous <Continuous>  dextrose 50% Injectable 25 Gram(s) IV Push once  dextrose 50% Injectable 12.5 Gram(s) IV Push once  dextrose 50% Injectable 25 Gram(s) IV Push once  enoxaparin Injectable 40 milliGRAM(s) SubCutaneous daily  glucagon  Injectable 1 milliGRAM(s) IntraMuscular once  insulin lispro (ADMELOG) corrective regimen sliding scale   SubCutaneous three times a day before meals  insulin lispro (ADMELOG) corrective regimen sliding scale   SubCutaneous at bedtime  levothyroxine 25 MICROGram(s) Oral daily  pantoprazole    Tablet 40 milliGRAM(s) Oral before breakfast    MEDICATIONS  (PRN):    ALLERGIES/INTOLERANCES:  Allergies  No Known Allergies    Intolerances    VITALS & EXAMINATION:  Vital Signs Last 24 Hrs  T(C): 36.8 (13 Sep 2021 11:58), Max: 37 (12 Sep 2021 23:30)  T(F): 98.2 (13 Sep 2021 11:58), Max: 98.6 (12 Sep 2021 23:30)  HR: 88 (13 Sep 2021 11:58) (78 - 88)  BP: 120/78 (13 Sep 2021 11:58) (102/65 - 120/78)  BP(mean): --  RR: 16 (13 Sep 2021 11:58) (16 - 18)  SpO2: 97% (13 Sep 2021 11:58) (96% - 98%)    General: Obese Female, appears stated age, in no apparent distress including pain  Eyes: clear conjunctiva  Respiratory: no respiratory distress, breathing comfortably on room air  Extremities: no calf tenderness to palpation    Neurological (>12):  MS: Awake, alert, oriented to person, place, situation, time. Normal affect. Follows all commands.  Language: Speech is clear, fluent with good comprehension    CNs: PERRL (R = 3mm, L = 3mm), no APD. CVF full. EOMI. V1-3 intact to LT b/l. No facial asymmetry b/l. Hearing grossly normal to conversation. Gag reflex deferred. Head turning & shoulder shrug intact b/l. Tongue midline, normal movements, no atrophy.    Motor: Normal muscle bulk & tone. No noticeable tremor. No pronator drift.               Deltoid	Biceps	Triceps	Wrist	   R	5	5	5	5	5		 	  L	5	5	5	5	5		    	H-Flex	H-Ext	H-ABd	H-ADd	K-Flex	K-Ext	D-Flex	P-Flex  R	5	5	-	-	5	5	5	5 	   L	5	5	-	-	5	5	5	5	     Sensation: PP intact equally b/l in LE's. Decreased to PP in the T5-6 dermatome.     Decreased sensation to PP and LT at lower back in left paraspinal area, intact sensation to PP and LT at lower back in right paraspinal area.   Cortical: Extinction on DSS (neglect): none    Reflexes: >10 beats of ankle clonus b/l               Biceps(C5)       BR(C6)     Triceps(C7)               Patellar(L4)    Achilles(S1)    Plantar Resp  R	2	          3	             3		        3		    2		Down   L	2	          3	             3		        3		    2		Down     Coordination: No dysmetria to FTN  Gait: No postural instability. Normal stance, arm swing, and gait.    LABORATORY:        U/A Urinalysis Basic - ( 10 Sep 2021 14:02 )    Color: Light Yellow / Appearance: Clear / S.034 / pH: x  Gluc: x / Ketone: Negative  / Bili: Negative / Urobili: Negative   Blood: x / Protein: Trace / Nitrite: Negative   Leuk Esterase: Negative / RBC: 440 /hpf / WBC 15 /HPF   Sq Epi: x / Non Sq Epi: 6 /hpf / Bacteria: Negative      Radiology (XR, CT, MR, U/S, TTE/KEIRA):      MR Cervical Spine w/wo IV Cont (09.10.21 @ 22:29)     IMPRESSION:   small linear focus of chronic signal abnormality within the posterior cord from C4 to T1, unchanged and consistent with a chronic lesion. No enhancement is seen.  Abnormal signal/edema is seen within the thoracic cord at T3-T7 with associated cord expansion and new enhancement consistent with an acute large demyelinating plaque, increased from prior examination with new enhancement. Degenerative spondylosis as described.

## 2021-09-13 NOTE — PROGRESS NOTE ADULT - ASSESSMENT
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 Initial vital signs unrevealing. Physical exam shows decreased sensation in the lower extremities (R>L) and sustained ankle clonus b/l. Labs significant for platelets of 114 and CRP of 6. MRI C and T spine showing acute large T3-T7 demyelinating plaque.    Impression: Sharp/stabbing mid-thoracic back pain and worsening mid-thoracic back numbness possibly localizing to cervical or thoracic spine of unclear etiology, found to have T3-T7 acute demyelinating plaque on MRI, likely NMO exacerbation    Plan:  - IV solumedrol 1 g daily for 5 days (with GI prophylaxis, [facility]) (Day 3/5)  - Began Lyrica 50mg bid po on 9/12/21  - start eculizumab, discussed with Dr. Resendiz in outpatient setting  - Attend ID clinic appointment for Meningitis vaccine prior to starting Eculizumab  - Restarted Vitamin D and B complex  - Increased Bowel regiment (Senna, Miralax, Dulcolax)    Labs:  -F/u infectious workup: no leukocytosis, no fever, UA neg, MR spine with no evidence of PNA, ESR 14    Other:  Outpatient neurology follow up with Dr. Resendiz.  Neurochecks    Case discussed w/ neuro attending Dr. 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 Initial vital signs unrevealing. Physical exam shows decreased sensation in the lower extremities (R>L) and sustained ankle clonus b/l. Labs significant for platelets of 114 and CRP of 6. MRI C and T spine showing acute large T3-T7 demyelinating plaque.    Impression: Sharp/stabbing mid-thoracic back pain and worsening mid-thoracic back numbness possibly localizing to cervical or thoracic spine of unclear etiology, found to have T3-T7 acute demyelinating plaque on MRI, likely NMO exacerbation    Plan:  - IV solumedrol 1 g daily for 5 days (with GI prophylaxis, [facility]) (Day 3/5)  - Began Lyrica 50mg bid po on 9/12/21 and may increase to three times daily in 5-7 days   - start eculizumab, discussed with Dr. Resendiz in outpatient setting  - Attend ID clinic appointment for Meningitis vaccine prior to starting Eculizumab   - Restarted Vitamin D and B complex  - Increased Bowel regiment (Senna, Miralax, Dulcolax)    Labs:  -F/u infectious workup: no leukocytosis, no fever, UA neg, MR spine with no evidence of PNA, ESR 14    Other:  Outpatient neurology follow up with Dr. Resendiz and or Dr No[facility]    Case discussed and seen  w/ neuro attending Dr. Pemberton

## 2021-09-14 ENCOUNTER — NON-APPOINTMENT (OUTPATIENT)
Age: 41
End: 2021-09-14

## 2021-09-14 LAB
GLUCOSE BLDC GLUCOMTR-MCNC: 120 MG/DL — HIGH (ref 70–99)
GLUCOSE BLDC GLUCOMTR-MCNC: 125 MG/DL — HIGH (ref 70–99)
GLUCOSE BLDC GLUCOMTR-MCNC: 159 MG/DL — HIGH (ref 70–99)
GLUCOSE BLDC GLUCOMTR-MCNC: 160 MG/DL — HIGH (ref 70–99)

## 2021-09-14 PROCEDURE — 99233 SBSQ HOSP IP/OBS HIGH 50: CPT | Mod: GC

## 2021-09-14 RX ORDER — PANTOPRAZOLE SODIUM 20 MG/1
1 TABLET, DELAYED RELEASE ORAL
Qty: 30 | Refills: 0
Start: 2021-09-14 | End: 2021-10-13

## 2021-09-14 RX ORDER — BENZOCAINE AND MENTHOL 5; 1 G/100ML; G/100ML
1 LIQUID ORAL
Refills: 0 | Status: DISCONTINUED | OUTPATIENT
Start: 2021-09-14 | End: 2021-09-15

## 2021-09-14 RX ADMIN — ENOXAPARIN SODIUM 40 MILLIGRAM(S): 100 INJECTION SUBCUTANEOUS at 11:38

## 2021-09-14 RX ADMIN — Medication 1: at 17:29

## 2021-09-14 RX ADMIN — Medication 50 MILLIGRAM(S): at 07:19

## 2021-09-14 RX ADMIN — Medication 50 MILLIGRAM(S): at 17:09

## 2021-09-14 RX ADMIN — Medication 58 MILLIGRAM(S): at 07:19

## 2021-09-14 RX ADMIN — BENZOCAINE AND MENTHOL 1 LOZENGE: 5; 1 LIQUID ORAL at 04:08

## 2021-09-14 RX ADMIN — Medication 2000 UNIT(S): at 07:20

## 2021-09-14 RX ADMIN — SENNA PLUS 2 TABLET(S): 8.6 TABLET ORAL at 11:38

## 2021-09-14 RX ADMIN — PANTOPRAZOLE SODIUM 40 MILLIGRAM(S): 20 TABLET, DELAYED RELEASE ORAL at 07:20

## 2021-09-14 RX ADMIN — Medication 25 MICROGRAM(S): at 07:20

## 2021-09-14 RX ADMIN — Medication 1 TABLET(S): at 11:38

## 2021-09-14 RX ADMIN — Medication 2000 UNIT(S): at 17:09

## 2021-09-14 NOTE — PROGRESS NOTE ADULT - SUBJECTIVE AND OBJECTIVE BOX
INTERVAL HISTORY:    Patient interviewed and examined at the bedside on the morning of 21. No acute overnight events however still reporting numbness, abnormal sensations on her bilateral sides of trunk and mid back down to her feet bilaterally. Still  no muscle weakness reported     PAST MEDICAL & SURGICAL HISTORY:  Hypothyroid    Anxiety    Myasthenia gravis    Neuromyelitis optica    MG status post thymectomy (myasthenia gravis)      History of appendectomy        FAMILY HISTORY:  No pertinent family history in first degree relatives      SOCIAL HISTORY:   T/E/D:   Occupation:   Lives with:     MEDICATIONS (HOME):  Home Medications:  predniSONE 20 mg oral tablet: 1 tab(s) orally once a day (started 3 days ago) (11 Sep 2021 12:50)  Vitamin D2 50 mcg (2000 intl units) oral capsule: 2 cap(s) orally once a day (11 Sep 2021 12:50)    MEDICATIONS  (STANDING):  bisacodyl Suppository 10 milliGRAM(s) Rectal once  dextrose 40% Gel 15 Gram(s) Oral once  dextrose 5%. 1000 milliLiter(s) (50 mL/Hr) IV Continuous <Continuous>  dextrose 5%. 1000 milliLiter(s) (100 mL/Hr) IV Continuous <Continuous>  dextrose 50% Injectable 25 Gram(s) IV Push once  dextrose 50% Injectable 12.5 Gram(s) IV Push once  dextrose 50% Injectable 25 Gram(s) IV Push once  enoxaparin Injectable 40 milliGRAM(s) SubCutaneous daily  glucagon  Injectable 1 milliGRAM(s) IntraMuscular once  insulin lispro (ADMELOG) corrective regimen sliding scale   SubCutaneous three times a day before meals  insulin lispro (ADMELOG) corrective regimen sliding scale   SubCutaneous at bedtime  levothyroxine 25 MICROGram(s) Oral daily  pantoprazole    Tablet 40 milliGRAM(s) Oral before breakfast    MEDICATIONS  (PRN):    ALLERGIES/INTOLERANCES:  Allergies  No Known Allergies    Intolerances    VITALS & EXAMINATION:  Vital Signs Last 24 Hrs  T(C): 36.7 (14 Sep 2021 11:59), Max: 36.9 (13 Sep 2021 19:25)  T(F): 98 (14 Sep 2021 11:59), Max: 98.5 (13 Sep 2021 19:25)  HR: 79 (14 Sep 2021 11:59) (68 - 98)  BP: 107/72 (14 Sep 2021 11:59) (104/65 - 115/75)  BP(mean): --  RR: 18 (14 Sep 2021 11:59) (18 - 18)  SpO2: 96% (14 Sep 2021 11:59) (96% - 98%)    General: Female, appears stated age, in no apparent distress including pain    Neurological (>12):  MS: Awake, alert, oriented to person, place, situation, time. Normal affect. Follows all commands.  Language: Speech is clear, fluent with good comprehension    CNs: PERRL (R = 3mm, L = 3mm), no APD. CVF full. EOMI. V1-3 intact to LT b/l. No facial asymmetry b/l. Hearing grossly normal to conversation. Gag reflex deferred. Head turning & shoulder shrug intact b/l. Tongue midline, normal movements, no atrophy.    Motor: Normal muscle bulk & tone. No noticeable tremor. No pronator drift.               Deltoid	Biceps	Triceps	Wrist	   R	5	5	5	5	5		 	  L	5	5	5	5	5		    	H-Flex	H-Ext	H-ABd	H-ADd	K-Flex	K-Ext	D-Flex	P-Flex  R	5	5	-	-	5	5	5	5 	   L	5	5	-	-	5	5	5	5	     Sensation: PP intact equally b/l in LE's. Decreased to PP in the T5-6 dermatome.     Decreased sensation to PP and LT at lower back in left paraspinal area, intact sensation to PP and LT at lower back in right paraspinal area.   Cortical: Extinction on DSS (neglect): none    Reflexes: >10 beats of ankle clonus b/l               Biceps(C5)       BR(C6)     Triceps(C7)               Patellar(L4)    Achilles(S1)    Plantar Resp  R	2	          3	             3		        3		    2		Down   L	2	          3	             3		        3		    2		Down     Coordination: No dysmetria to FTN  Gait: No postural instability. Normal stance, arm swing, and gait.    LABORATORY:        U/A Urinalysis Basic - ( 10 Sep 2021 14:02 )    Color: Light Yellow / Appearance: Clear / S.034 / pH: x  Gluc: x / Ketone: Negative  / Bili: Negative / Urobili: Negative   Blood: x / Protein: Trace / Nitrite: Negative   Leuk Esterase: Negative / RBC: 440 /hpf / WBC 15 /HPF   Sq Epi: x / Non Sq Epi: 6 /hpf / Bacteria: Negative      Radiology (XR, CT, MR, U/S, TTE/KEIRA):      MR Cervical Spine w/wo IV Cont (09.10.21 @ 22:29)     IMPRESSION:   small linear focus of chronic signal abnormality within the posterior cord from C4 to T1, unchanged and consistent with a chronic lesion. No enhancement is seen.  Abnormal signal/edema is seen within the thoracic cord at T3-T7 with associated cord expansion and new enhancement consistent with an acute large demyelinating plaque, increased from prior examination with new enhancement. Degenerative spondylosis as described.

## 2021-09-14 NOTE — PROGRESS NOTE ADULT - ASSESSMENT
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 Initial vital signs unrevealing. Physical exam shows decreased sensation in the lower extremities (R>L) and sustained ankle clonus b/l. Labs significant for platelets of 114 and CRP of 6. MRI C and T spine showing acute large T3-T7 demyelinating plaque.    Impression: Sharp/stabbing mid-thoracic back pain and worsening mid-thoracic back numbness possibly localizing to cervical or thoracic spine of unclear etiology, found to have T3-T7 acute demyelinating plaque on MRI, likely NMO exacerbation    Plan:  - IV solumedrol 1 g daily for 5 days (with GI prophylaxis, [facility]) (Day 4/5)  - Began Lyrica 50mg bid po on 9/12/21 and may increase to three times daily in 5-7 days   - start eculizumab, discussed with Dr. Resendiz in outpatient setting  - Contact ID clinic appointment for Meningitis vaccine prior to starting Eculizumab   - Restarted Vitamin D and B complex  - Increased Bowel regiment (Senna, Miralax, Dulcolax)    Labs:  -F/u infectious workup: no leukocytosis, no fever, UA neg, MR spine with no evidence of PNA, ESR 14    Other:  Outpatient neurology follow up with Dr. Resendiz and or Dr Noemi Gale  Neurochecks  Anticipate discharge tomorrow 9/15    Case discussed and seen  w/ neuro attending Dr. 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 Initial vital signs unrevealing. Physical exam shows decreased sensation in the lower extremities (R>L) and sustained ankle clonus b/l. Labs significant for platelets of 114 and CRP of 6. MRI C and T spine showing acute large T3-T7 demyelinating plaque.    Impression: Sharp/stabbing mid-thoracic back pain and worsening mid-thoracic back numbness possibly localizing to cervical or thoracic spine of unclear etiology, found to have T3-T7 acute demyelinating plaque on MRI, likely NMO exacerbation    Plan:  - IV solumedrol 1 g daily for 5 days (with GI prophylaxis, [facility]) (Day 4/5) and will complete tomorrow   - Began Lyrica 50mg bid po on 9/12/21 and may increase to50mg  three times daily in 5-7 days   - start eculizumab, discussed with Dr. Resendiz in outpatient setting  - Contact ID clinic appointment for Meningitis vaccine prior to starting Eculizumab   - Restarted Vitamin D and B complex  - Increased Bowel regiment (Senna, Miralax, Dulcolax)    Labs:  -F/u infectious workup: no leukocytosis, no fever, UA neg, MR spine with no evidence of PNA, ESR 14    Other:  Outpatient neurology follow up with Dr. Resendiz and or Dr Noemi Gale  Neurochecks  Anticipate discharge tomorrow 9/15    Case discussed and seen  w/ neuro attending Dr. Pemberton

## 2021-09-15 ENCOUNTER — TRANSCRIPTION ENCOUNTER (OUTPATIENT)
Age: 41
End: 2021-09-15

## 2021-09-15 VITALS
TEMPERATURE: 98 F | RESPIRATION RATE: 18 BRPM | DIASTOLIC BLOOD PRESSURE: 68 MMHG | SYSTOLIC BLOOD PRESSURE: 101 MMHG | HEART RATE: 76 BPM | OXYGEN SATURATION: 97 %

## 2021-09-15 DIAGNOSIS — M79.2 NEURALGIA AND NEURITIS, UNSPECIFIED: ICD-10-CM

## 2021-09-15 DIAGNOSIS — R20.0 ANESTHESIA OF SKIN: ICD-10-CM

## 2021-09-15 LAB
GLUCOSE BLDC GLUCOMTR-MCNC: 121 MG/DL — HIGH (ref 70–99)
GLUCOSE BLDC GLUCOMTR-MCNC: 97 MG/DL — SIGNIFICANT CHANGE UP (ref 70–99)

## 2021-09-15 PROCEDURE — 84702 CHORIONIC GONADOTROPIN TEST: CPT

## 2021-09-15 PROCEDURE — 90734 MENACWYD/MENACWYCRM VACC IM: CPT

## 2021-09-15 PROCEDURE — 99233 SBSQ HOSP IP/OBS HIGH 50: CPT | Mod: GC

## 2021-09-15 PROCEDURE — 85652 RBC SED RATE AUTOMATED: CPT

## 2021-09-15 PROCEDURE — 82550 ASSAY OF CK (CPK): CPT

## 2021-09-15 PROCEDURE — 85025 COMPLETE CBC W/AUTO DIFF WBC: CPT

## 2021-09-15 PROCEDURE — 82962 GLUCOSE BLOOD TEST: CPT

## 2021-09-15 PROCEDURE — 72156 MRI NECK SPINE W/O & W/DYE: CPT | Mod: MA

## 2021-09-15 PROCEDURE — 80053 COMPREHEN METABOLIC PANEL: CPT

## 2021-09-15 PROCEDURE — 81001 URINALYSIS AUTO W/SCOPE: CPT

## 2021-09-15 PROCEDURE — 36415 COLL VENOUS BLD VENIPUNCTURE: CPT

## 2021-09-15 PROCEDURE — A9585: CPT

## 2021-09-15 PROCEDURE — U0005: CPT

## 2021-09-15 PROCEDURE — U0003: CPT

## 2021-09-15 PROCEDURE — 72157 MRI CHEST SPINE W/O & W/DYE: CPT | Mod: MA

## 2021-09-15 PROCEDURE — 99285 EMERGENCY DEPT VISIT HI MDM: CPT

## 2021-09-15 PROCEDURE — 86140 C-REACTIVE PROTEIN: CPT

## 2021-09-15 RX ORDER — ERGOCALCIFEROL 1.25 MG/1
2 CAPSULE ORAL
Qty: 120 | Refills: 1
Start: 2021-09-15 | End: 2021-11-13

## 2021-09-15 RX ORDER — ERGOCALCIFEROL 1.25 MG/1
2 CAPSULE ORAL
Qty: 0 | Refills: 0 | DISCHARGE

## 2021-09-15 RX ORDER — NEISSERIA MENINGITIDIS GROUP A CAPSULAR POLYSACCHARIDE TETANUS TOXOID CONJUGATE ANTIGEN, NEISSERIA MENINGITIDIS GROUP C CAPSULAR POLYSACCHARIDE TETANUS TOXOID CONJUGATE ANTIGEN, NEISSERIA MENINGITIDIS GROUP Y CAPSULAR POLYSACCHARIDE TETANUS TOXOID CONJUGATE ANTIGEN, AND NEISSERIA MENINGITIDIS GROUP W-135 CAPSULAR POLYSACCHARIDE TETANUS TOXOID CONJUGATE ANTIGEN 10; 10; 10; 10 UG/.5ML; UG/.5ML; UG/.5ML; UG/.5ML
0.5 INJECTION, SOLUTION INTRAMUSCULAR ONCE
Refills: 0 | Status: COMPLETED | OUTPATIENT
Start: 2021-09-15 | End: 2021-09-15

## 2021-09-15 RX ORDER — SENNA PLUS 8.6 MG/1
2 TABLET ORAL
Qty: 60 | Refills: 0
Start: 2021-09-15 | End: 2021-10-14

## 2021-09-15 RX ADMIN — Medication 50 MILLIGRAM(S): at 06:33

## 2021-09-15 RX ADMIN — Medication 58 MILLIGRAM(S): at 09:17

## 2021-09-15 RX ADMIN — NEISSERIA MENINGITIDIS GROUP A CAPSULAR POLYSACCHARIDE TETANUS TOXOID CONJUGATE ANTIGEN, NEISSERIA MENINGITIDIS GROUP C CAPSULAR POLYSACCHARIDE TETANUS TOXOID CONJUGATE ANTIGEN, NEISSERIA MENINGITIDIS GROUP Y CAPSULAR POLYSACCHARIDE TETANUS TOXOID CONJUGATE ANTIGEN, AND NEISSERIA MENINGITIDIS GROUP W-135 CAPSULAR POLYSACCHARIDE TETANUS TOXOID CONJUGATE ANTIGEN 0.5 MILLILITER(S): 10; 10; 10; 10 INJECTION, SOLUTION INTRAMUSCULAR at 13:21

## 2021-09-15 RX ADMIN — PANTOPRAZOLE SODIUM 40 MILLIGRAM(S): 20 TABLET, DELAYED RELEASE ORAL at 06:32

## 2021-09-15 RX ADMIN — ENOXAPARIN SODIUM 40 MILLIGRAM(S): 100 INJECTION SUBCUTANEOUS at 11:27

## 2021-09-15 RX ADMIN — Medication 2000 UNIT(S): at 06:33

## 2021-09-15 RX ADMIN — Medication 25 MICROGRAM(S): at 06:32

## 2021-09-15 RX ADMIN — Medication 1 TABLET(S): at 11:31

## 2021-09-15 RX ADMIN — SENNA PLUS 2 TABLET(S): 8.6 TABLET ORAL at 11:34

## 2021-09-15 NOTE — PROGRESS NOTE ADULT - TIME BILLING
Plan discussed with patient and neurology team at length.
Plan discussed with neurology team and patient in great detail.
Plan discussed with patient and neurology team at length.

## 2021-09-15 NOTE — DISCHARGE NOTE PROVIDER - NSDCMRMEDTOKEN_GEN_ALL_CORE_FT
pantoprazole 40 mg oral delayed release tablet: 1 tab(s) orally once a day (before a meal)  predniSONE 10 mg oral tablet: 3 tab(s) orally once a day   Synthroid 25 mcg (0.025 mg) oral tablet: 1 tab(s) orally once a day in the morning before breakfast  Vitamin B Complex with C oral tablet: 1 tab(s) orally once a day  Vitamin D2 50 mcg (2000 intl units) oral capsule: 2 cap(s) orally once a day   Lyrica 50 mg oral capsule: 1 cap(s) orally 2 times a day MDD:100mg  pantoprazole 40 mg oral delayed release tablet: 1 tab(s) orally once a day (before a meal)  predniSONE 10 mg oral tablet: 3 tab(s) orally once a day   senna oral tablet: 2 tab(s) orally once a day   Synthroid 25 mcg (0.025 mg) oral tablet: 1 tab(s) orally once a day in the morning before breakfast  Vitamin B Complex with C oral tablet: 1 tab(s) orally once a day  Vitamin B Complex with C oral tablet: 1 tab(s) orally once a day  Vitamin D2 50 mcg (2000 intl units) oral capsule: 2 cap(s) orally 2 times a day

## 2021-09-15 NOTE — DISCHARGE NOTE PROVIDER - NSDCCPCAREPLAN_GEN_ALL_CORE_FT
PRINCIPAL DISCHARGE DIAGNOSIS  Diagnosis: Neuromyelitis optica  Assessment and Plan of Treatment:

## 2021-09-15 NOTE — DISCHARGE NOTE NURSING/CASE MANAGEMENT/SOCIAL WORK - NSDCPEFALRISK_GEN_ALL_CORE
For information on Fall & injury Prevention, visit [email or web address]

## 2021-09-15 NOTE — PROGRESS NOTE ADULT - ATTENDING COMMENTS
Patient seen and examined with neurology team and above note reviewed and I agree with assessment and plan as outlined. Patient with persistent left sided and truncal numbness but no weakness.   Exam unchanged from yesterday and she is tolerating the IV steroids well.    Plan: NMO disease and will once last dose of IV steroids administered tomorrow may discharge with outpatient follow up with Dr Noemi Gale    2. To get Solaris as outpatient and she will need to get meningitis vaccine prior to medication administration     3. Neuropathic pain and numbness: continue lyrica 50mg BID and may titrate as tolerated     4. Continue oral prednisone 30mg daily at discharge     Continue medical and neurologic care and mgt and all questions answered and she understood plan.
Patient seen and examined with neurology team and above note reviewed in detail and I agree with assessment and plan as outlined. Patient hx as noted and has NMO and found to have an acute enhancing demyelinating lesion at T3-T7 that corresponds to her symptoms.    Exam as detailed above and she has diminished sensation to LT and PP on left side of trunk and back compared to right side.  No weakness elicited     MRIs reviewed in detail and discussed with patient     Plan: Patient with NMO exacerbation and will complete 5 day course of IV solumedrol now day 3/5    2. Continue lyrica 50mg BID for neuropathic pain and adjust or increase as tolerated    3. Plan for solaris as outpatient with Dr Resendiz or Dr Gale however she will first need meningitis vaccine     4. Continue medical and neurologic mgt and supportive care and all questions answered
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 MRI C and T spine showing acute large T3-T7 demyelinating plaque. Pt admitted for 5 day course of pulse steroids. Will consult ID for timing of meningococcal vaccine in prep for initiation of Soliris as outpatient. Will d/w Dr. Gale Monday. Pt also wanted something for the pain. Did not have good results with gabapentin. Will start Pregabalin 50mg BID.
Patient seen and examined with neurology team and above note reviewed and I agree with assessment and plan as outlined. Patient completed IV steroids and tolerated well but still reporting residual numbness and abnormal sensations on left side of body and leg.     No new neurologic findings on examination     Plan: NMO exacerbation : continue oral prednisone 30mg daily and GI prevention     2. Neuropathic pain continue lyrica 50mg BID and increase to 50mg TID in 1 week    3. Follow up with Dr Noemi Gale in 1 week for solaris as she got her meningitis vaccine today     4. All questions answered and she understood plan and is willing to comply

## 2021-09-15 NOTE — DISCHARGE NOTE NURSING/CASE MANAGEMENT/SOCIAL WORK - PATIENT PORTAL LINK FT
You can access the FollowMyHealth Patient Portal offered by [facility] by registering at the following website: [email or web address]. By joining [facility]’s FollowMyHealth portal, you will also be able to view your health information using other applications (apps) compatible with our system.

## 2021-09-15 NOTE — PROGRESS NOTE ADULT - SUBJECTIVE AND OBJECTIVE BOX
INTERVAL HISTORY:    Patient interviewed and examined at the bedside on the morning of 9/15/21. No acute overnight events overnight. To be discharged to home today. Received meningitis vaccine dose 1 today.     PAST MEDICAL & SURGICAL HISTORY:  Hypothyroid    Anxiety    Myasthenia gravis    Neuromyelitis optica    MG status post thymectomy (myasthenia gravis)      History of appendectomy        FAMILY HISTORY:  No pertinent family history in first degree relatives      SOCIAL HISTORY:   T/E/D:   Occupation:   Lives with:     MEDICATIONS (HOME):  Home Medications:  predniSONE 20 mg oral tablet: 1 tab(s) orally once a day (started 3 days ago) (11 Sep 2021 12:50)  Vitamin D2 50 mcg (2000 intl units) oral capsule: 2 cap(s) orally once a day (11 Sep 2021 12:50)    MEDICATIONS  (STANDING):  bisacodyl Suppository 10 milliGRAM(s) Rectal once  dextrose 40% Gel 15 Gram(s) Oral once  dextrose 5%. 1000 milliLiter(s) (50 mL/Hr) IV Continuous <Continuous>  dextrose 5%. 1000 milliLiter(s) (100 mL/Hr) IV Continuous <Continuous>  dextrose 50% Injectable 25 Gram(s) IV Push once  dextrose 50% Injectable 12.5 Gram(s) IV Push once  dextrose 50% Injectable 25 Gram(s) IV Push once  enoxaparin Injectable 40 milliGRAM(s) SubCutaneous daily  glucagon  Injectable 1 milliGRAM(s) IntraMuscular once  insulin lispro (ADMELOG) corrective regimen sliding scale   SubCutaneous three times a day before meals  insulin lispro (ADMELOG) corrective regimen sliding scale   SubCutaneous at bedtime  levothyroxine 25 MICROGram(s) Oral daily  pantoprazole    Tablet 40 milliGRAM(s) Oral before breakfast    MEDICATIONS  (PRN):    ALLERGIES/INTOLERANCES:  Allergies  No Known Allergies    Intolerances    VITALS & EXAMINATION:  Vital Signs Last 24 Hrs  T(C): 36.6 (15 Sep 2021 12:02), Max: 36.8 (14 Sep 2021 20:11)  T(F): 97.9 (15 Sep 2021 12:02), Max: 98.2 (14 Sep 2021 20:11)  HR: 76 (15 Sep 2021 12:02) (73 - 92)  BP: 101/68 (15 Sep 2021 12:02) (101/67 - 123/75)  BP(mean): --  RR: 18 (15 Sep 2021 12:02) (18 - 18)  SpO2: 97% (15 Sep 2021 12:02) (95% - 99%)    General: Female, appears stated age, in no apparent distress including pain    Neurological (>12):  MS: Awake, alert, oriented to person, place, situation, time. Normal affect. Follows all commands.  Language: Speech is clear, fluent    CNs: PERRL (R = 3mm, L = 3mm), no APD. EOMI. V1-3 intact to LT b/l. No facial asymmetry b/l. ongue midline, normal movements, no atrophy.    Motor: Normal muscle bulk & tone. No noticeable tremor. No pronator drift.     Sensation: PP intact equally b/l in LE's. Decreased to PP in the T5-6 dermatome.     Decreased sensation to PP and LT at lower back in left paraspinal area, intact sensation to PP and LT at lower back in right paraspinal area.   Cortical: Extinction on DSS (neglect): none    Reflexes:               Patellar(L4)    Achilles(S1)    Plantar Resp  R	3		    2		Down   L	3		    2		Down     Coordination: No dysmetria to FTN  Gait: No postural instability. Normal stance, arm swing, and gait.    LABORATORY:        U/A Urinalysis Basic - ( 10 Sep 2021 14:02 )    Color: Light Yellow / Appearance: Clear / S.034 / pH: x  Gluc: x / Ketone: Negative  / Bili: Negative / Urobili: Negative   Blood: x / Protein: Trace / Nitrite: Negative   Leuk Esterase: Negative / RBC: 440 /hpf / WBC 15 /HPF   Sq Epi: x / Non Sq Epi: 6 /hpf / Bacteria: Negative      Radiology (XR, CT, MR, U/S, TTE/KEIRA):      MR Cervical Spine w/wo IV Cont (09.10.21 @ 22:29)     IMPRESSION:   small linear focus of chronic signal abnormality within the posterior cord from C4 to T1, unchanged and consistent with a chronic lesion. No enhancement is seen.  Abnormal signal/edema is seen within the thoracic cord at T3-T7 with associated cord expansion and new enhancement consistent with an acute large demyelinating plaque, increased from prior examination with new enhancement. Degenerative spondylosis as described.     INTERVAL HISTORY:    Patient interviewed and examined at the bedside on the morning of 9/15/21. No acute overnight events overnight. To be discharged to home today. Received meningitis vaccine dose 1 today.   Still reporting numbness and vibration on left side of body and trunk but no weakness.     PAST MEDICAL & SURGICAL HISTORY:  Hypothyroid    Anxiety    Myasthenia gravis    Neuromyelitis optica    MG status post thymectomy (myasthenia gravis)      History of appendectomy        FAMILY HISTORY:  No pertinent family history in first degree relatives      SOCIAL HISTORY:   T/E/D:   Occupation:   Lives with:     MEDICATIONS (HOME):  Home Medications:  predniSONE 20 mg oral tablet: 1 tab(s) orally once a day (started 3 days ago) (11 Sep 2021 12:50)  Vitamin D2 50 mcg (2000 intl units) oral capsule: 2 cap(s) orally once a day (11 Sep 2021 12:50)    MEDICATIONS  (STANDING):  bisacodyl Suppository 10 milliGRAM(s) Rectal once  dextrose 40% Gel 15 Gram(s) Oral once  dextrose 5%. 1000 milliLiter(s) (50 mL/Hr) IV Continuous <Continuous>  dextrose 5%. 1000 milliLiter(s) (100 mL/Hr) IV Continuous <Continuous>  dextrose 50% Injectable 25 Gram(s) IV Push once  dextrose 50% Injectable 12.5 Gram(s) IV Push once  dextrose 50% Injectable 25 Gram(s) IV Push once  enoxaparin Injectable 40 milliGRAM(s) SubCutaneous daily  glucagon  Injectable 1 milliGRAM(s) IntraMuscular once  insulin lispro (ADMELOG) corrective regimen sliding scale   SubCutaneous three times a day before meals  insulin lispro (ADMELOG) corrective regimen sliding scale   SubCutaneous at bedtime  levothyroxine 25 MICROGram(s) Oral daily  pantoprazole    Tablet 40 milliGRAM(s) Oral before breakfast    MEDICATIONS  (PRN):    ALLERGIES/INTOLERANCES:  Allergies  No Known Allergies    Intolerances    VITALS & EXAMINATION:  Vital Signs Last 24 Hrs  T(C): 36.6 (15 Sep 2021 12:02), Max: 36.8 (14 Sep 2021 20:11)  T(F): 97.9 (15 Sep 2021 12:02), Max: 98.2 (14 Sep 2021 20:11)  HR: 76 (15 Sep 2021 12:02) (73 - 92)  BP: 101/68 (15 Sep 2021 12:02) (101/67 - 123/75)  BP(mean): --  RR: 18 (15 Sep 2021 12:02) (18 - 18)  SpO2: 97% (15 Sep 2021 12:02) (95% - 99%)    General: Female, appears stated age, in no apparent distress including pain    Neurological (>12):  MS: Awake, alert, oriented to person, place, situation, time. Normal affect. Follows all commands.  Language: Speech is clear, fluent    CNs: PERRL (R = 3mm, L = 3mm), no APD. EOMI. V1-3 intact to LT b/l. No facial asymmetry b/l. ongue midline, normal movements, no atrophy.    Motor: Normal muscle bulk & tone. No noticeable tremor. No pronator drift.     Sensation: PP intact equally b/l in LE's. Decreased to PP in the T5-6 dermatome.     Decreased sensation to PP and LT at lower back in left paraspinal area, intact sensation to PP and LT at lower back in right paraspinal area.   Cortical: Extinction on DSS (neglect): none    Reflexes:               Patellar(L4)    Achilles(S1)    Plantar Resp  R	3		    2		Down   L	3		    2		Down     Coordination: No dysmetria to FTN  Gait: No postural instability. Normal stance, arm swing, and gait.    LABORATORY:        U/A Urinalysis Basic - ( 10 Sep 2021 14:02 )    Color: Light Yellow / Appearance: Clear / S.034 / pH: x  Gluc: x / Ketone: Negative  / Bili: Negative / Urobili: Negative   Blood: x / Protein: Trace / Nitrite: Negative   Leuk Esterase: Negative / RBC: 440 /hpf / WBC 15 /HPF   Sq Epi: x / Non Sq Epi: 6 /hpf / Bacteria: Negative      Radiology (XR, CT, MR, U/S, TTE/KEIRA):      MR Cervical Spine w/wo IV Cont (09.10.21 @ 22:29)     IMPRESSION:   small linear focus of chronic signal abnormality within the posterior cord from C4 to T1, unchanged and consistent with a chronic lesion. No enhancement is seen.  Abnormal signal/edema is seen within the thoracic cord at T3-T7 with associated cord expansion and new enhancement consistent with an acute large demyelinating plaque, increased from prior examination with new enhancement. Degenerative spondylosis as described.

## 2021-09-15 NOTE — DISCHARGE NOTE PROVIDER - HOSPITAL COURSE
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 Patient saw Dr. Resendiz on 9/3 and was given PO prednisone. Patient stated that initially she felt electric shocks in the bottom of her feet as well as light shocks throughout her body. She then felt "light" numbness in a spot over the left mid-back that worsened over days and then moved to the center of her back. She then noticed a headache afterward, which eventually resolved. This morning, the patient woke up and felt that the numbness was much worse. Patient had residual band like feeling around her chest from initial NMO presentation. Patient had left torso discomfort that is intermittent over the past three days. Patient also endorsed hot flashes, chills, and lip tingling. She had an episode of left arm numbness that lasted for one day as well. She denied any vision changes or weakness. She denies pain or difficulty with urination, cough, or sick contacts. Initial exam notable for decreased sensation in the lower extremities (R>L) and sustained ankle clonus b/l. Labs were significant for platelets of 114 and CRP of 6. MRI C and T spine showed acute large T3-T7 demyelinating plaque. Patient was given 5 days of Solumedrol IV 1gm, with mild improvement in symptoms. She was also placed on Lyrica 50mg BID oral.     MRI cervical spine 9/10/2021: small linear focus of chronic signal abnormality within the posterior cord from C4 to T1, unchanged and consistent with a chronic lesion. No enhancement is seen.  Abnormal signal/edema is seen within the thoracic cord at T3-T7 with associated cord expansion and new enhancement consistent with an acute large demyelinating plaque, increased from prior examination with new enhancement.    Degenerative spondylosis as described.      PLAN:  -Start Prednisone 30mg daily oral until visit with MS specialist Dr. Gale on 9/21/2021  -Continue Lyrica 50mg twice a day oral  -Continue Vitamin B-complex and Vitamin D  -Follow-up with scheduled appointment with MS specialist Dr. 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 Patient saw Dr. Resendiz on 9/3 and was given PO prednisone. Patient stated that initially she felt electric shocks in the bottom of her feet as well as light shocks throughout her body. She then felt "light" numbness in a spot over the left mid-back that worsened over days and then moved to the center of her back. She then noticed a headache afterward, which eventually resolved. This morning, the patient woke up and felt that the numbness was much worse. Patient had residual band like feeling around her chest from initial NMO presentation. Patient had left torso discomfort that is intermittent over the past three days. Patient also endorsed hot flashes, chills, and lip tingling. She had an episode of left arm numbness that lasted for one day as well. She denied any vision changes or weakness. She denies pain or difficulty with urination, cough, or sick contacts. Initial exam notable for decreased sensation in the lower extremities (R>L) and sustained ankle clonus b/l. Labs were significant for platelets of 114 and CRP of 6. MRI C and T spine showed acute large T3-T7 demyelinating plaque. Patient was given 5 days of Solumedrol IV 1gm, with mild improvement in symptoms. She was also placed on Lyrica 50mg BID oral.     MRI cervical spine 9/10/2021: small linear focus of chronic signal abnormality within the posterior cord from C4 to T1, unchanged and consistent with a chronic lesion. No enhancement is seen.  Abnormal signal/edema is seen within the thoracic cord at T3-T7 with associated cord expansion and new enhancement consistent with an acute large demyelinating plaque, increased from prior examination with new enhancement. Degenerative spondylosis as described.      PLAN:  -Start Prednisone 30mg daily oral until visit with MS specialist Dr. Gale on 9/21/2021  -Continue Lyrica 50mg twice a day oral  -Continue Vitamin B-complex and Vitamin D  -Call Infectious Disease clinic ([phone]) to schedule meningitis vaccine within 1-2 weeks, before starting Eculizumab. Patient can also obtain vaccine with PCP.    -Follow-up with scheduled appointment with MS specialist Dr. Noemi Gale 9/21/2021

## 2021-09-15 NOTE — PROGRESS NOTE ADULT - ASSESSMENT
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 Initial vital signs unrevealing. Physical exam shows decreased sensation in the lower extremities (R>L) and sustained ankle clonus b/l. Labs significant for platelets of 114 and CRP of 6. MRI C and T spine showing acute large T3-T7 demyelinating plaque.    Impression: Sharp/stabbing mid-thoracic back pain and worsening mid-thoracic back numbness possibly localizing to cervical or thoracic spine of unclear etiology, found to have T3-T7 acute demyelinating plaque on MRI, likely NMO exacerbation    Plan:  - DC to home  -S/p  IV solumedrol 1 g daily for 5 days (with GI prophylaxis, [facility]) (Day 5/5)   - Continue Lyrica 50mg bid po   - start eculizumab, discussed with Dr. Resendiz in outpatient setting  - S/p meningitis vaccine #1 while inpatient  - Continue Vitamin D and B complex  - Increased Bowel regiment (Senna, Miralax, Dulcolax)    Other:  Outpatient neurology follow up with Dr. Resendiz and or Dr Noemi Gale      Case discussed and seen  w/ neuro attending Dr. 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 Initial vital signs unrevealing. Physical exam shows decreased sensation in the lower extremities (R>L) and sustained ankle clonus b/l. Labs significant for platelets of 114 and CRP of 6. MRI C and T spine showing acute large T3-T7 demyelinating plaque.    Impression: Sharp/stabbing mid-thoracic back pain and worsening mid-thoracic back numbness possibly localizing to cervical or thoracic spine of unclear etiology, found to have T3-T7 acute demyelinating plaque on MRI, likely NMO exacerbation    Plan:  - DC to home  -S/p  IV solumedrol 1 g daily for 5 days (with GI prophylaxis, [facility]) (Day 5/5)   - Continue Lyrica 50mg bid po then increase to TID in 1 week   - start eculizumab, discussed with Dr. Resendiz in outpatient setting or Dr Noemi Gale   - S/p meningitis vaccine #1 while inpatient  - Continue Vitamin D and B complex  - Increased Bowel regiment (Senna, Miralax, Dulcolax)    Other:  Outpatient neurology follow up with Dr. Resendiz and or Dr Noemi Gale      Case discussed and seen  w/ neuro attending Dr. Pemberton

## 2021-09-28 ENCOUNTER — LABORATORY RESULT (OUTPATIENT)
Age: 41
End: 2021-09-28

## 2021-09-28 ENCOUNTER — OUTPATIENT (OUTPATIENT)
Dept: OUTPATIENT SERVICES | Facility: HOSPITAL | Age: 41
LOS: 1 days | End: 2021-09-28
Payer: COMMERCIAL

## 2021-09-28 ENCOUNTER — APPOINTMENT (OUTPATIENT)
Dept: OBGYN | Facility: CLINIC | Age: 41
End: 2021-09-28
Payer: COMMERCIAL

## 2021-09-28 VITALS — DIASTOLIC BLOOD PRESSURE: 70 MMHG | SYSTOLIC BLOOD PRESSURE: 106 MMHG | BODY MASS INDEX: 31.83 KG/M2 | WEIGHT: 174 LBS

## 2021-09-28 DIAGNOSIS — Z90.49 ACQUIRED ABSENCE OF OTHER SPECIFIED PARTS OF DIGESTIVE TRACT: Chronic | ICD-10-CM

## 2021-09-28 DIAGNOSIS — N76.0 ACUTE VAGINITIS: ICD-10-CM

## 2021-09-28 DIAGNOSIS — G70.00 MYASTHENIA GRAVIS WITHOUT (ACUTE) EXACERBATION: Chronic | ICD-10-CM

## 2021-09-28 DIAGNOSIS — Z00.00 ENCOUNTER FOR GENERAL ADULT MEDICAL EXAMINATION W/OUT ABNORMAL FINDINGS: ICD-10-CM

## 2021-09-28 PROCEDURE — 87624 HPV HI-RISK TYP POOLED RSLT: CPT

## 2021-09-28 PROCEDURE — 87591 N.GONORRHOEAE DNA AMP PROB: CPT

## 2021-09-28 PROCEDURE — 87625 HPV TYPES 16 & 18 ONLY: CPT

## 2021-09-28 PROCEDURE — 87491 CHLMYD TRACH DNA AMP PROBE: CPT

## 2021-09-28 PROCEDURE — 99396 PREV VISIT EST AGE 40-64: CPT

## 2021-09-28 PROCEDURE — G0463: CPT

## 2021-09-28 PROCEDURE — 88175 CYTOPATH C/V AUTO FLUID REDO: CPT

## 2021-09-28 PROCEDURE — 88141 CYTOPATH C/V INTERPRET: CPT

## 2021-09-29 LAB
C TRACH RRNA SPEC QL NAA+PROBE: SIGNIFICANT CHANGE UP
HPV HIGH+LOW RISK DNA PNL CVX: DETECTED
N GONORRHOEA RRNA SPEC QL NAA+PROBE: SIGNIFICANT CHANGE UP
SPECIMEN SOURCE: SIGNIFICANT CHANGE UP

## 2021-09-30 LAB
HPV GENOTYPE 16: SIGNIFICANT CHANGE UP
HPV GENOTYPE 18/45: DETECTED

## 2021-10-04 DIAGNOSIS — Z01.419 ENCOUNTER FOR GYNECOLOGICAL EXAMINATION (GENERAL) (ROUTINE) WITHOUT ABNORMAL FINDINGS: ICD-10-CM

## 2021-10-04 DIAGNOSIS — B97.7 PAPILLOMAVIRUS AS THE CAUSE OF DISEASES CLASSIFIED ELSEWHERE: ICD-10-CM

## 2021-10-05 ENCOUNTER — APPOINTMENT (OUTPATIENT)
Dept: INFECTIOUS DISEASE | Facility: CLINIC | Age: 41
End: 2021-10-05
Payer: COMMERCIAL

## 2021-10-05 ENCOUNTER — NON-APPOINTMENT (OUTPATIENT)
Age: 41
End: 2021-10-05

## 2021-10-05 VITALS
HEART RATE: 96 BPM | OXYGEN SATURATION: 97 % | TEMPERATURE: 98.4 F | SYSTOLIC BLOOD PRESSURE: 118 MMHG | WEIGHT: 170 LBS | DIASTOLIC BLOOD PRESSURE: 83 MMHG | RESPIRATION RATE: 16 BRPM | HEIGHT: 62 IN | BODY MASS INDEX: 31.28 KG/M2

## 2021-10-05 DIAGNOSIS — Z23 ENCOUNTER FOR IMMUNIZATION: ICD-10-CM

## 2021-10-05 LAB — CYTOLOGY SPEC DOC CYTO: SIGNIFICANT CHANGE UP

## 2021-10-05 PROCEDURE — 99203 OFFICE O/P NEW LOW 30 MIN: CPT | Mod: 25

## 2021-10-05 PROCEDURE — 90471 IMMUNIZATION ADMIN: CPT

## 2021-10-05 PROCEDURE — 90620 MENB-4C VACCINE IM: CPT

## 2021-10-05 NOTE — ASSESSMENT
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  Due for #2 11/15/21.\par Will give MenB (Bexsero) #1 today. Can return 11/15/21 for dose #2 of Bexsero.\par \par Reviewed side effects with pt.\par \par If to remain on Soliris will need revaccination in 1 year with Bexsero and then every 2-3 years there after.  Menveo will need to be boosted in 5 years.  She should f/up with me prior to this b/c recommendations do change  according to current guidelines. \par \par Pt to return 11/15/21 for next doses.

## 2021-10-05 NOTE — HISTORY OF PRESENT ILLNESS
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 Given Menveo dose #1 9/15/2021.  Due for #2 11/15/2021.\par Needs Meningitis B vaccine at least 2 weeks prior to Soliris. \par Tolerated Menveo vaccine.  I have documented the first menveo dose in allscripts.\par \par Denies fevers, chills, cough, runny nose. No n/v/d.\par Reports numbness from waist down related to NMO.

## 2021-10-05 NOTE — PHYSICAL EXAM
[General Appearance - Alert] : alert [General Appearance - In No Acute Distress] : in no acute distress [Sclera] : the sclera and conjunctiva were normal [Outer Ear] : the ears and nose were normal in appearance [Neck Appearance] : the appearance of the neck was normal [] : no respiratory distress [Auscultation Breath Sounds / Voice Sounds] : lungs were clear to auscultation bilaterally [Heart Rate And Rhythm] : heart rate was normal and rhythm regular [Heart Sounds] : normal S1 and S2 [Bowel Sounds] : normal bowel sounds [Abdomen Soft] : soft [Musculoskeletal - Swelling] : no joint swelling [Skin Lesions] : no skin lesions [FreeTextEntry1] : as per neuro [Oriented To Time, Place, And Person] : oriented to person, place, and time [Affect] : the affect was normal

## 2021-10-11 ENCOUNTER — INPATIENT (INPATIENT)
Facility: HOSPITAL | Age: 41
LOS: 3 days | Discharge: HOME | DRG: 60 | End: 2021-10-15
Attending: PSYCHIATRY & NEUROLOGY | Admitting: PSYCHIATRY & NEUROLOGY
Payer: COMMERCIAL

## 2021-10-11 VITALS
HEIGHT: 62 IN | SYSTOLIC BLOOD PRESSURE: 148 MMHG | OXYGEN SATURATION: 99 % | HEART RATE: 86 BPM | RESPIRATION RATE: 17 BRPM | DIASTOLIC BLOOD PRESSURE: 99 MMHG | TEMPERATURE: 98 F

## 2021-10-11 DIAGNOSIS — Z90.49 ACQUIRED ABSENCE OF OTHER SPECIFIED PARTS OF DIGESTIVE TRACT: Chronic | ICD-10-CM

## 2021-10-11 DIAGNOSIS — G70.00 MYASTHENIA GRAVIS WITHOUT (ACUTE) EXACERBATION: Chronic | ICD-10-CM

## 2021-10-11 LAB
ALBUMIN SERPL ELPH-MCNC: 4.3 G/DL — SIGNIFICANT CHANGE UP (ref 3.3–5)
ALP SERPL-CCNC: 40 U/L — SIGNIFICANT CHANGE UP (ref 40–120)
ALT FLD-CCNC: 19 U/L — SIGNIFICANT CHANGE UP (ref 10–45)
ANION GAP SERPL CALC-SCNC: 15 MMOL/L — SIGNIFICANT CHANGE UP (ref 5–17)
ANION GAP SERPL CALC-SCNC: 16 MMOL/L — SIGNIFICANT CHANGE UP (ref 5–17)
AST SERPL-CCNC: 30 U/L — SIGNIFICANT CHANGE UP (ref 10–40)
BASE EXCESS BLDV CALC-SCNC: 2.2 MMOL/L — HIGH (ref -2–2)
BASOPHILS # BLD AUTO: 0.02 K/UL — SIGNIFICANT CHANGE UP (ref 0–0.2)
BASOPHILS NFR BLD AUTO: 0.2 % — SIGNIFICANT CHANGE UP (ref 0–2)
BILIRUB SERPL-MCNC: 0.3 MG/DL — SIGNIFICANT CHANGE UP (ref 0.2–1.2)
BUN SERPL-MCNC: 11 MG/DL — SIGNIFICANT CHANGE UP (ref 7–23)
BUN SERPL-MCNC: 11 MG/DL — SIGNIFICANT CHANGE UP (ref 7–23)
CA-I SERPL-SCNC: 1.16 MMOL/L — SIGNIFICANT CHANGE UP (ref 1.15–1.33)
CALCIUM SERPL-MCNC: 9 MG/DL — SIGNIFICANT CHANGE UP (ref 8.4–10.5)
CALCIUM SERPL-MCNC: 9.3 MG/DL — SIGNIFICANT CHANGE UP (ref 8.4–10.5)
CHLORIDE BLDV-SCNC: 101 MMOL/L — SIGNIFICANT CHANGE UP (ref 96–108)
CHLORIDE SERPL-SCNC: 101 MMOL/L — SIGNIFICANT CHANGE UP (ref 96–108)
CHLORIDE SERPL-SCNC: 101 MMOL/L — SIGNIFICANT CHANGE UP (ref 96–108)
CO2 BLDV-SCNC: 30 MMOL/L — HIGH (ref 22–26)
CO2 SERPL-SCNC: 19 MMOL/L — LOW (ref 22–31)
CO2 SERPL-SCNC: 21 MMOL/L — LOW (ref 22–31)
CREAT SERPL-MCNC: 0.6 MG/DL — SIGNIFICANT CHANGE UP (ref 0.5–1.3)
CREAT SERPL-MCNC: 0.61 MG/DL — SIGNIFICANT CHANGE UP (ref 0.5–1.3)
CRP SERPL-MCNC: 6 MG/L — HIGH (ref 0–4)
EOSINOPHIL # BLD AUTO: 0.13 K/UL — SIGNIFICANT CHANGE UP (ref 0–0.5)
EOSINOPHIL NFR BLD AUTO: 1.3 % — SIGNIFICANT CHANGE UP (ref 0–6)
GAS PNL BLDV: 136 MMOL/L — SIGNIFICANT CHANGE UP (ref 136–145)
GAS PNL BLDV: SIGNIFICANT CHANGE UP
GAS PNL BLDV: SIGNIFICANT CHANGE UP
GLUCOSE BLDV-MCNC: 96 MG/DL — SIGNIFICANT CHANGE UP (ref 70–99)
GLUCOSE SERPL-MCNC: 115 MG/DL — HIGH (ref 70–99)
GLUCOSE SERPL-MCNC: 99 MG/DL — SIGNIFICANT CHANGE UP (ref 70–99)
HCO3 BLDV-SCNC: 28 MMOL/L — SIGNIFICANT CHANGE UP (ref 22–29)
HCT VFR BLD CALC: 40.7 % — SIGNIFICANT CHANGE UP (ref 34.5–45)
HCT VFR BLDA CALC: 40 % — SIGNIFICANT CHANGE UP (ref 34.5–46.5)
HGB BLD CALC-MCNC: 13.3 G/DL — SIGNIFICANT CHANGE UP (ref 11.7–16.1)
HGB BLD-MCNC: 12.8 G/DL — SIGNIFICANT CHANGE UP (ref 11.5–15.5)
IMM GRANULOCYTES NFR BLD AUTO: 0.4 % — SIGNIFICANT CHANGE UP (ref 0–1.5)
LACTATE BLDV-MCNC: 2 MMOL/L — SIGNIFICANT CHANGE UP (ref 0.7–2)
LIDOCAIN IGE QN: 50 U/L — SIGNIFICANT CHANGE UP (ref 7–60)
LYMPHOCYTES # BLD AUTO: 1.97 K/UL — SIGNIFICANT CHANGE UP (ref 1–3.3)
LYMPHOCYTES # BLD AUTO: 19 % — SIGNIFICANT CHANGE UP (ref 13–44)
MAGNESIUM SERPL-MCNC: 2.1 MG/DL — SIGNIFICANT CHANGE UP (ref 1.6–2.6)
MCHC RBC-ENTMCNC: 26.3 PG — LOW (ref 27–34)
MCHC RBC-ENTMCNC: 31.4 GM/DL — LOW (ref 32–36)
MCV RBC AUTO: 83.7 FL — SIGNIFICANT CHANGE UP (ref 80–100)
MONOCYTES # BLD AUTO: 0.81 K/UL — SIGNIFICANT CHANGE UP (ref 0–0.9)
MONOCYTES NFR BLD AUTO: 7.8 % — SIGNIFICANT CHANGE UP (ref 2–14)
NEUTROPHILS # BLD AUTO: 7.42 K/UL — HIGH (ref 1.8–7.4)
NEUTROPHILS NFR BLD AUTO: 71.3 % — SIGNIFICANT CHANGE UP (ref 43–77)
NRBC # BLD: 0 /100 WBCS — SIGNIFICANT CHANGE UP (ref 0–0)
PCO2 BLDV: 49 MMHG — HIGH (ref 39–42)
PH BLDV: 7.37 — SIGNIFICANT CHANGE UP (ref 7.32–7.43)
PLATELET # BLD AUTO: 196 K/UL — SIGNIFICANT CHANGE UP (ref 150–400)
PO2 BLDV: 28 MMHG — SIGNIFICANT CHANGE UP (ref 25–45)
POTASSIUM BLDV-SCNC: 4.1 MMOL/L — SIGNIFICANT CHANGE UP (ref 3.5–5.1)
POTASSIUM SERPL-MCNC: 3.7 MMOL/L — SIGNIFICANT CHANGE UP (ref 3.5–5.3)
POTASSIUM SERPL-MCNC: 4.4 MMOL/L — SIGNIFICANT CHANGE UP (ref 3.5–5.3)
POTASSIUM SERPL-SCNC: 3.7 MMOL/L — SIGNIFICANT CHANGE UP (ref 3.5–5.3)
POTASSIUM SERPL-SCNC: 4.4 MMOL/L — SIGNIFICANT CHANGE UP (ref 3.5–5.3)
PROT SERPL-MCNC: 7.2 G/DL — SIGNIFICANT CHANGE UP (ref 6–8.3)
RBC # BLD: 4.86 M/UL — SIGNIFICANT CHANGE UP (ref 3.8–5.2)
RBC # FLD: 13.2 % — SIGNIFICANT CHANGE UP (ref 10.3–14.5)
SAO2 % BLDV: 45 % — LOW (ref 67–88)
SODIUM SERPL-SCNC: 136 MMOL/L — SIGNIFICANT CHANGE UP (ref 135–145)
SODIUM SERPL-SCNC: 137 MMOL/L — SIGNIFICANT CHANGE UP (ref 135–145)
WBC # BLD: 10.39 K/UL — SIGNIFICANT CHANGE UP (ref 3.8–10.5)
WBC # FLD AUTO: 10.39 K/UL — SIGNIFICANT CHANGE UP (ref 3.8–10.5)

## 2021-10-11 PROCEDURE — 99285 EMERGENCY DEPT VISIT HI MDM: CPT

## 2021-10-11 RX ADMIN — Medication 58 MILLIGRAM(S): at 22:55

## 2021-10-11 NOTE — ED PROVIDER NOTE - NS ED ROS FT
CONSTITUTIONAL: No fevers, chills, fatigue, weakness, unexpected weight change  CV: No chest pain  PULM: No shortness of breath  GI: + nausea. No abdominal pain, vomiting, diarrhea, constipation, bowel incontinence  : No dysuria, polyuria, hematuria, bladder incontinence  SKIN: No rashes, swelling  MSK: + left flank/back pain  NEURO: + leg numbness (chronic). No headache, tingling, sciatica, seizures, saddle anesthesia  HEME: No nodules  PSYCH: No SI

## 2021-10-11 NOTE — ED PROVIDER NOTE - ATTENDING CONTRIBUTION TO CARE
------------ATTENDING NOTE------------   pt w/ boyfriend c/o several days of constant waxing/waning dull ache/tingling pain in mid L flank wrapping around to L mid abdomen, identical to past MSO exacerbations, had recent allergic rhinitis that has resolved, no fevers, no chest pain or sob/dyspnea, no nausea or anorexia or change in BM/stools or urinary complaints, pt's Neurologist recommended Methylprednisolone 1gm IV and pt has MRI scheduled for tomorrow, awaiting labs and Neuro consult at ED sign out 11PM.  - Lambert Tejada MD   -----------------------------------------------

## 2021-10-11 NOTE — ED PROVIDER NOTE - PHYSICAL EXAMINATION
GENERAL: middle aged female, lying in bed, on right sided, appears anxious, uncomfortable. Vital signs are within normal limits  EYES: Conjunctiva noninjected or pale, sclera anicteric  HENT: NC/AT, moist mucous membranes  NECK: Supple, trachea midline  LUNG: Nonlabored respirations, no wheezes, rales  CV: RRR, Pulses- Radial/dorsalis pedis: 2+ bilateral and equal  ABDOMEN: Nondistended, nontender, no guarding  MSK: Nontender extremities. No midline spinal tenderness, step-offs, or deformities. No paraspinal tenderness  -Range of motion: Full range UE b/l (shoulder, elbow, wrist, fingers); LE b/l (hip, knee, ankle); nonrestricted flexion/extension/rotation of back  -Strength: 5/5 muscle strength UE/LE b/l   SKIN: No rashes, bruises  NEURO: AAOx4 (to person, place, time, event), no tremor, no truncal ataxia, decreased sensation to touch bilateral LE  PSYCH: Normal mood and affect

## 2021-10-11 NOTE — ED ADULT NURSE NOTE - OBJECTIVE STATEMENT
Pt A&Ox4, ambulatory with assistance. Pt presented to ED with c/o abdominal/back pain. Pt A&Ox4, ambulatory with assistance. Pt presented to ED with c/o abdominal/back pain. Known hx neuromyelitis optica (suppose to be on steroids), myasthenias gravis and hypothyroidism. Pt states pain has been around for the past 2 weeks and has gotten progressively worse. Describes the pain as squeezing and burning along the left back and flank area. Pain does not radiate anywhere. Has some mild nausea with the pain but no vomiting.   Denies CP, SOB, V/D, dizziness, LOC, weakness, numbness, tingling, fever, chills., dysuria.

## 2021-10-11 NOTE — ED PROVIDER NOTE - OBJECTIVE STATEMENT
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 Endorses nausea with the pain. Denies vomiting, fever, chills, urinary sxs. Chronic numbness. Discussed with neuro Dr. Resendiz, rec 1g solumdrol, discuss with house neuro.

## 2021-10-11 NOTE — ED PROVIDER NOTE - RAPID ASSESSMENT
41y F with pmhx of neuromyelitis optica on steroids, myasthenias gravis, and hypothyroid p/w severe back pain. Describes pain as sharp, stabbing and burning in nature.  Pain radiating to abd as well. Endorses associated nausea. Pt reports similar Sx with her prior flare ups.    Patient was seen as a tele QDOC patient. The patient will be seen and further worked up in the main emergency department and their care will be completed by the main emergency department team along with a thorough physical exam. Receiving team will follow up on labs, analgesia, any clinical imaging, reassess and disposition as clinically indicated, all decisions regarding the progression of care will be made at their discretion.    Scribe Statement: I, Adolfo Thao, attest that this documentation has been prepared under the direction and in the presence of Urbano Fisher) 41y F with pmhx of neuromyelitis optica on steroids, myasthenias gravis, and hypothyroid p/w severe back pain. Describes pain as sharp, stabbing and burning in nature.  Pain radiating to abd as well. Endorses associated nausea. Pt reports similar Sx with her prior flare ups.    *spoke with mobile to expedite pt to main ED for more thorough eval and potential steroids.     Patient was seen as a tele QDOC patient. The patient will be seen and further worked up in the main emergency department and their care will be completed by the main emergency department team along with a thorough physical exam. Receiving team will follow up on labs, analgesia, any clinical imaging, reassess and disposition as clinically indicated, all decisions regarding the progression of care will be made at their discretion.    Scribe Statement: I, Adolfo Thao, attest that this documentation has been prepared under the direction and in the presence of Urbano Fisher) 41y F with pmhx of neuromyelitis optica on steroids, myasthenias gravis, and hypothyroid p/w severe back pain. Describes pain as sharp, stabbing and burning in nature.  Pain radiating to abd as well. Endorses associated nausea. Pt reports similar Sx with her prior flare ups.    *spoke with mobile to expedite pt to main ED for more thorough eval and potential steroids.     Patient was seen as a tele QDOC patient. The patient will be seen and further worked up in the main emergency department and their care will be completed by the main emergency department team along with a thorough physical exam. Receiving team will follow up on labs, analgesia, any clinical imaging, reassess and disposition as clinically indicated, all decisions regarding the progression of care will be made at their discretion.    Scribe Statement: I, Adolfo Thao, attest that this documentation has been prepared under the direction and in the presence of Urbano Fisher)    Phillip PICKETT: The scribe's documentation has been prepared under my direction and personally reviewed by me in its entirety. I confirm that the note above accurately reflects all work, treatment, procedures, and medical decision making performed by me (Dr. Fisher).

## 2021-10-11 NOTE — ED PROVIDER NOTE - TRANSFER CONSULTATION #1
Health Maintenance Due   Topic Date Due   • Shingles Vaccine (1 of 2) 10/01/1998   • Hepatitis C Screening  10/01/1999   • DTaP/Tdap/Td Vaccine (1 - Tdap) 09/02/2017   • Medicare Wellness 65+  09/04/2019       Patient is due for topics as listed above but is not proceeding with Immunization(s) Dtap/Tdap/Td and Shingles at this time.  Appt scheduled to perform MWV (Medicare Wellness Visit)             neuro/will see patient in ED

## 2021-10-11 NOTE — ED ADULT NURSE NOTE - NSIMPLEMENTINTERV_GEN_ALL_ED
Implemented All Universal Safety Interventions:  [place] to call system. Call bell, personal items and telephone within reach. Instruct patient to call for assistance. Room bathroom lighting operational. Non-slip footwear when patient is off stretcher. Physically safe environment: no spills, clutter or unnecessary equipment. Stretcher in lowest position, wheels locked, appropriate side rails in place.

## 2021-10-11 NOTE — ED PROVIDER NOTE - CLINICAL SUMMARY MEDICAL DECISION MAKING FREE TEXT BOX
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 Stable lower extremity neuro exam (LE dec sensation to touch) per discussion with neurologist. No CVA tenderness, pain likely more internal. No lower extremity motor changes, bowel or bladder dysfunction, saddle anesthesia. Suspect NMO flare. Unclear trigger, patient states this just occurs sometimes. Will check labs, inflam markers, 1g solumedrol, discuss with neuro.

## 2021-10-12 ENCOUNTER — APPOINTMENT (OUTPATIENT)
Dept: MRI IMAGING | Facility: CLINIC | Age: 41
End: 2021-10-12

## 2021-10-12 DIAGNOSIS — M79.2 NEURALGIA AND NEURITIS, UNSPECIFIED: ICD-10-CM

## 2021-10-12 LAB
A1C WITH ESTIMATED AVERAGE GLUCOSE RESULT: 5.2 % — SIGNIFICANT CHANGE UP (ref 4–5.6)
ANION GAP SERPL CALC-SCNC: 17 MMOL/L — SIGNIFICANT CHANGE UP (ref 5–17)
BUN SERPL-MCNC: 13 MG/DL — SIGNIFICANT CHANGE UP (ref 7–23)
CALCIUM SERPL-MCNC: 9.8 MG/DL — SIGNIFICANT CHANGE UP (ref 8.4–10.5)
CHLORIDE SERPL-SCNC: 101 MMOL/L — SIGNIFICANT CHANGE UP (ref 96–108)
CO2 SERPL-SCNC: 19 MMOL/L — LOW (ref 22–31)
CREAT SERPL-MCNC: 0.53 MG/DL — SIGNIFICANT CHANGE UP (ref 0.5–1.3)
ESTIMATED AVERAGE GLUCOSE: 103 MG/DL — SIGNIFICANT CHANGE UP (ref 68–114)
GLUCOSE BLDC GLUCOMTR-MCNC: 108 MG/DL — HIGH (ref 70–99)
GLUCOSE BLDC GLUCOMTR-MCNC: 116 MG/DL — HIGH (ref 70–99)
GLUCOSE BLDC GLUCOMTR-MCNC: 165 MG/DL — HIGH (ref 70–99)
GLUCOSE BLDC GLUCOMTR-MCNC: 197 MG/DL — HIGH (ref 70–99)
GLUCOSE SERPL-MCNC: 172 MG/DL — HIGH (ref 70–99)
HCG UR QL: NEGATIVE — SIGNIFICANT CHANGE UP
HCT VFR BLD CALC: 40.2 % — SIGNIFICANT CHANGE UP (ref 34.5–45)
HGB BLD-MCNC: 13.1 G/DL — SIGNIFICANT CHANGE UP (ref 11.5–15.5)
MCHC RBC-ENTMCNC: 26.9 PG — LOW (ref 27–34)
MCHC RBC-ENTMCNC: 32.6 GM/DL — SIGNIFICANT CHANGE UP (ref 32–36)
MCV RBC AUTO: 82.5 FL — SIGNIFICANT CHANGE UP (ref 80–100)
NRBC # BLD: 0 /100 WBCS — SIGNIFICANT CHANGE UP (ref 0–0)
PLATELET # BLD AUTO: 230 K/UL — SIGNIFICANT CHANGE UP (ref 150–400)
POTASSIUM SERPL-MCNC: 4.1 MMOL/L — SIGNIFICANT CHANGE UP (ref 3.5–5.3)
POTASSIUM SERPL-SCNC: 4.1 MMOL/L — SIGNIFICANT CHANGE UP (ref 3.5–5.3)
RBC # BLD: 4.87 M/UL — SIGNIFICANT CHANGE UP (ref 3.8–5.2)
RBC # FLD: 13.2 % — SIGNIFICANT CHANGE UP (ref 10.3–14.5)
SARS-COV-2 RNA SPEC QL NAA+PROBE: SIGNIFICANT CHANGE UP
SODIUM SERPL-SCNC: 137 MMOL/L — SIGNIFICANT CHANGE UP (ref 135–145)
T3 SERPL-MCNC: 128 NG/DL — SIGNIFICANT CHANGE UP (ref 80–200)
T4 AB SER-ACNC: 9.6 UG/DL — SIGNIFICANT CHANGE UP (ref 4.6–12)
TSH SERPL-MCNC: 1.53 UIU/ML — SIGNIFICANT CHANGE UP (ref 0.27–4.2)
WBC # BLD: 12.58 K/UL — HIGH (ref 3.8–10.5)
WBC # FLD AUTO: 12.58 K/UL — HIGH (ref 3.8–10.5)

## 2021-10-12 PROCEDURE — 72156 MRI NECK SPINE W/O & W/DYE: CPT | Mod: 26

## 2021-10-12 PROCEDURE — 72157 MRI CHEST SPINE W/O & W/DYE: CPT | Mod: 26

## 2021-10-12 PROCEDURE — 99222 1ST HOSP IP/OBS MODERATE 55: CPT

## 2021-10-12 RX ORDER — INSULIN LISPRO 100/ML
VIAL (ML) SUBCUTANEOUS AT BEDTIME
Refills: 0 | Status: DISCONTINUED | OUTPATIENT
Start: 2021-10-12 | End: 2021-10-15

## 2021-10-12 RX ORDER — DEXTROSE 50 % IN WATER 50 %
25 SYRINGE (ML) INTRAVENOUS ONCE
Refills: 0 | Status: DISCONTINUED | OUTPATIENT
Start: 2021-10-12 | End: 2021-10-15

## 2021-10-12 RX ORDER — PANTOPRAZOLE SODIUM 20 MG/1
40 TABLET, DELAYED RELEASE ORAL
Refills: 0 | Status: DISCONTINUED | OUTPATIENT
Start: 2021-10-12 | End: 2021-10-15

## 2021-10-12 RX ORDER — DEXTROSE 50 % IN WATER 50 %
15 SYRINGE (ML) INTRAVENOUS ONCE
Refills: 0 | Status: DISCONTINUED | OUTPATIENT
Start: 2021-10-12 | End: 2021-10-15

## 2021-10-12 RX ORDER — SODIUM CHLORIDE 9 MG/ML
1000 INJECTION, SOLUTION INTRAVENOUS ONCE
Refills: 0 | Status: COMPLETED | OUTPATIENT
Start: 2021-10-12 | End: 2021-10-12

## 2021-10-12 RX ORDER — GLUCAGON INJECTION, SOLUTION 0.5 MG/.1ML
1 INJECTION, SOLUTION SUBCUTANEOUS ONCE
Refills: 0 | Status: DISCONTINUED | OUTPATIENT
Start: 2021-10-12 | End: 2021-10-15

## 2021-10-12 RX ORDER — ENOXAPARIN SODIUM 100 MG/ML
40 INJECTION SUBCUTANEOUS DAILY
Refills: 0 | Status: DISCONTINUED | OUTPATIENT
Start: 2021-10-12 | End: 2021-10-15

## 2021-10-12 RX ORDER — SODIUM CHLORIDE 9 MG/ML
1000 INJECTION, SOLUTION INTRAVENOUS
Refills: 0 | Status: DISCONTINUED | OUTPATIENT
Start: 2021-10-12 | End: 2021-10-15

## 2021-10-12 RX ORDER — INSULIN LISPRO 100/ML
VIAL (ML) SUBCUTANEOUS
Refills: 0 | Status: DISCONTINUED | OUTPATIENT
Start: 2021-10-12 | End: 2021-10-15

## 2021-10-12 RX ORDER — DEXTROSE 50 % IN WATER 50 %
12.5 SYRINGE (ML) INTRAVENOUS ONCE
Refills: 0 | Status: DISCONTINUED | OUTPATIENT
Start: 2021-10-12 | End: 2021-10-15

## 2021-10-12 RX ORDER — LEVOTHYROXINE SODIUM 125 MCG
25 TABLET ORAL DAILY
Refills: 0 | Status: DISCONTINUED | OUTPATIENT
Start: 2021-10-12 | End: 2021-10-15

## 2021-10-12 RX ORDER — SENNA PLUS 8.6 MG/1
2 TABLET ORAL DAILY
Refills: 0 | Status: DISCONTINUED | OUTPATIENT
Start: 2021-10-12 | End: 2021-10-15

## 2021-10-12 RX ORDER — CHOLECALCIFEROL (VITAMIN D3) 125 MCG
4000 CAPSULE ORAL DAILY
Refills: 0 | Status: DISCONTINUED | OUTPATIENT
Start: 2021-10-12 | End: 2021-10-15

## 2021-10-12 RX ADMIN — SODIUM CHLORIDE 1000 MILLILITER(S): 9 INJECTION, SOLUTION INTRAVENOUS at 01:44

## 2021-10-12 RX ADMIN — Medication 25 MICROGRAM(S): at 05:21

## 2021-10-12 RX ADMIN — PANTOPRAZOLE SODIUM 40 MILLIGRAM(S): 20 TABLET, DELAYED RELEASE ORAL at 05:21

## 2021-10-12 RX ADMIN — Medication 1: at 08:44

## 2021-10-12 RX ADMIN — SENNA PLUS 2 TABLET(S): 8.6 TABLET ORAL at 05:21

## 2021-10-12 RX ADMIN — Medication 58 MILLIGRAM(S): at 17:30

## 2021-10-12 RX ADMIN — ENOXAPARIN SODIUM 40 MILLIGRAM(S): 100 INJECTION SUBCUTANEOUS at 17:32

## 2021-10-12 NOTE — H&P ADULT - REASON FOR ADMISSION
NMO flare left flank/back pain, squeezing, burning, progressively worsening x2 weeks w/o radiation similar to NMO flare sx

## 2021-10-12 NOTE — H&P ADULT - HISTORY OF PRESENT ILLNESS
41 y.o. 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 Resendiz at [address] [place] [address] and Dr. Gale at [place]), and hypothyroidism presented to the ED due to two weeks of L back stabbing pain. The sx is similar to her previous NMO attack and it started 2 weeks ago with diffuse headache, cold-like sx, throat "sensitivities". Pt was advised to be started on 60 mg of prednisone qd but pt did not want to start because she was tapering off from previous hospitalization on 9/10/21. At that time, pt received IV solumedro 1g x 5 days and sxs improved and was sent home. Pt smokes 3 cigarettes/day, social EtOH use, but does not use illicit drugs like marijuana, cocaine, or heroin. Currently denies any headache, double vision or blurry vision, n/v, chest pain, pain/numbness/tingling in all extremities. No urinary or defecation difficulties, fall, or weakness. Has L back pain around "T4-T6" regions. Headache is on/off and noted to be diffuse mostly on top of the head that improves with Advil. No aggravating position or factors noted. Lives at home with family, no sick contacts, recent vaccination of meningococcal B last on 10/6, does not take aspirin/plavix/other blood thinners, does not have hx of prior stroke or seizure or use of AEDs. 41 y.o. R-H F with PMH of MG (binding/modulating Abs s/p thymectomy), NMO (previously on inebilizumab, now getting ready to be on Solaris to start on 10/19, follows with Dr. Resendiz at [address] [place] [address] and Dr. Gale at [place]), and hypothyroidism presented to the ED due to two weeks of L back stabbing pain. The sx is similar to her previous NMO attack and it started 2 weeks ago with diffuse headache, cold-like sx, throat "sensitivities". Pt was advised to be started on 60 mg of prednisone qd but pt did not want to start because she was tapering off from previous hospitalization on 9/10/21. At that time, pt received IV solumedro 1g x 5 days and sxs improved and was sent home. Pt smokes 3 cigarettes/day, social EtOH use, but does not use illicit drugs like marijuana, cocaine, or heroin. Currently denies any headache, double vision or blurry vision, n/v, chest pain, pain/numbness/tingling in all extremities. No urinary or defecation difficulties, fall, or weakness. Has L back pain around "T4-T6" regions. Headache is on/off and noted to be diffuse mostly on top of the head that improves with Advil. No aggravating position or factors noted. Lives at home with family, no sick contacts, recent vaccination of meningococcal B last on 10/6, does not take aspirin/plavix/other blood thinners, does not have hx of prior stroke or seizure or use of AEDs. 41 y.o. R-H F with PMH of MG (binding/modulating Abs s/p thymectomy), NMO (previously on inebilizumab, now getting ready to be on Solaris to start on 10/19, follows with Dr. Resendiz at [address] [place] [address] and Dr. Gale at [place]), and hypothyroidism presented to the ED due to two weeks of L back stabbing pain. Neuro consulted for eval of sx for NMO flare. The sx is similar to her previous NMO attack and it started 2 weeks ago with diffuse headache, cold-like sx, throat "sensitivities". Pt was advised to be started on 60 mg of prednisone qd but pt did not want to start because she was tapering off from previous hospitalization on 9/10/21. At that time, pt received IV solumedro 1g x 5 days and sxs improved and was sent home. Pt smokes 3 cigarettes/day, social EtOH use, but does not use illicit drugs like marijuana, cocaine, or heroin. Currently denies any headache, double vision or blurry vision, n/v, chest pain, pain/numbness/tingling in all extremities. No urinary or defecation difficulties, fall, or weakness. Has L back pain around "T4-T6" regions. Headache is on/off and noted to be diffuse mostly on top of the head that improves with Advil. No aggravating position or factors noted. Lives at home with family, no sick contacts, recent vaccination of meningococcal B last on 10/6, does not take aspirin/plavix/other blood thinners, does not have hx of prior stroke or seizure or use of AEDs. 41 y.o. R-H F with PMH of MG (binding/modulating Abs s/p thymectomy), NMO (previously on inebilizumab, now getting ready to be on Solaris to start on 10/19, follows with Dr. Resendiz at [address] [place] [address] and Dr. Gale at [place]), and hypothyroidism presented to the ED due to two weeks of L back stabbing pain. Neuro consulted for eval of sx for NMO flare. The sx is similar to her previous NMO attack and it started 2 weeks ago with diffuse headache, cold-like sx, throat "sensitivities". Pt was advised to be started on 60 mg of prednisone qd but pt did not want to start because she was tapering off from previous hospitalization on 9/10/21. At that time, pt received IV solumedro 1g x 5 days and sxs improved and was sent home. Pt smokes 3 cigarettes/day on/off for 6 years, social EtOH use, but does not use illicit drugs like marijuana, cocaine, or heroin. Currently denies any headache, double vision or blurry vision, n/v, chest pain, pain/numbness/tingling in all extremities. No urinary or defecation difficulties, fall, or weakness. Has L back pain around "T4-T6" regions. Headache is on/off and noted to be diffuse mostly on top of the head that improves with Advil. No aggravating position or factors noted. Lives at home with family, no sick contacts, recent vaccination of meningococcal B last on 10/6, does not take aspirin/plavix/other blood thinners, does not have hx of prior stroke or seizure or use of AEDs.  41 y.o. R-H F with PMH of MG (binding/modulating Abs s/p thymectomy), NMO (previously on inebilizumab, now getting ready to be on Solaris to start on 10/19, follows with Dr. Resendiz at [address] [place] [address] and Dr. Gale at [place]), and hypothyroidism presented to the ED due to two weeks of L back stabbing pain. Neuro consulted for eval of sx for NMO flare per Dr. Resendiz's recommendations. The sx is similar to her previous NMO attack and it started 2 weeks ago with diffuse headache, cold-like sx, throat "sensitivities". Pt was advised to be started on 60 mg of prednisone qd but pt did not want to start because she was tapering off from previous hospitalization on 9/10/21. At that time, pt received IV solumedro 1g x 5 days and sxs improved and was sent home. Pt smokes 3 cigarettes/day on/off for 6 years, social EtOH use, but does not use illicit drugs like marijuana, cocaine, or heroin. Currently denies any headache, double vision or blurry vision, n/v, chest pain, pain/numbness/tingling in all extremities. No urinary or defecation difficulties, fall, or weakness. Has L back pain around "T4-T6" regions. Headache is on/off and noted to be diffuse mostly on top of the head that improves with Advil. No aggravating position or factors noted. Lives at home with family, no sick contacts, recent vaccination of meningococcal B last on 10/6, does not take aspirin/plavix/other blood thinners, does not have hx of prior stroke or seizure or use of AEDs.  41 y.o. R-H F with PMH of MG (binding/modulating Abs s/p thymectomy) in remission, NMO (previously on inebilizumab, now getting ready to be on Solaris to start on 10/19, follows with Dr. Resendiz at [address] [place] [address] and Dr. Gale at [place]), and hypothyroidism presented to the ED due to two weeks of L back stabbing pain. Neuro consulted for eval of sx for NMO flare per Dr. Resendiz's recommendations. The sx is similar to her previous NMO attack and it started 2 weeks ago with diffuse headache, cold-like sx, throat "sensitivities". Pt was advised to be started on 60 mg of prednisone qd but pt did not want to start because she was tapering off from previous hospitalization on 9/10/21. At that time, pt received IV solumedro 1g x 5 days and sxs improved and was sent home. Pt smokes 3 cigarettes/day on/off for 6 years, social EtOH use, but does not use illicit drugs like marijuana, cocaine, or heroin. Currently denies any headache, double vision or blurry vision, n/v, chest pain, pain/numbness/tingling in all extremities. No urinary or defecation difficulties, fall, or weakness. Has L back pain around "T4-T6" regions. Headache is on/off and noted to be diffuse mostly on top of the head that improves with Advil. No aggravating position or factors noted. Lives at home with family, no sick contacts, recent vaccination of meningococcal B last on 10/6, does not take aspirin/plavix/other blood thinners, does not have hx of prior stroke or seizure or use of AEDs.  41 y.o. R-H F with PMH of MG (binding/modulating Abs s/p thymectomy) in remission, NMO (previously on inebilizumab, now getting ready to be on Solaris to start on 10/19, follows with Dr. Resendiz at [address] [place] [address] and Dr. Gale at [place]), and hypothyroidism presented to the ED due to two weeks of L back stabbing pain. Neuro consulted for eval of sx for NMO flare per Dr. Resendiz's recommendations. The sx is similar to her previous NMO attack and it started 2 weeks ago with diffuse headache, cold-like sx, throat "sensitivities". On 10/5, pt called Dr. Resendiz to inform about the sx, and pt was advised to be started on 60 mg of prednisone qd but pt did not want to start because she was tapering off from previous hospitalization on 9/10/21. At that time, pt received IV solumedrol 1g x 5 days and sxs improved and was sent home. Pt smokes 3 cigarettes/day on/off for 6 years, social EtOH use, but does not use illicit drugs like marijuana, cocaine, or heroin. Currently denies any headache, double vision or blurry vision, n/v, chest pain, pain/numbness/tingling in all extremities. No urinary or defecation difficulties, fall, or weakness. Has L back pain around "T4-T6" regions. Headache is on/off and noted to be diffuse mostly on top of the head that improves with Advil. No aggravating position or factors noted. Lives at home with family, no sick contacts, recent vaccination of meningococcal B last on 10/6, does not take aspirin/plavix/other blood thinners, does not have hx of prior stroke or seizure or use of AEDs.  41 y.o. 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 Resendiz at [address] [place] [address] and Dr. Gale at [place]), and hypothyroidism presented to the ED due to two weeks of L back stabbing pain. Neuro consulted for eval of sx for NMO flare per Dr. Resendiz's recommendations. The sx is similar to her previous NMO attack and it started 2 weeks ago with diffuse headache, cold-like sx, throat "sensitivities". On 10/5, pt called Dr. Resendiz to inform about the sx, and pt was advised to be started on 60 mg of prednisone qd but pt did not want to start because she was tapering off from previous hospitalization on 9/10/21. At that time, pt received IV solumedrol 1g x 5 days and sxs improved and was sent home. Pt smokes 3 cigarettes/day on/off for 6 years, social EtOH use, but does not use illicit drugs like marijuana, cocaine, or heroin. Currently denies any headache, double vision or blurry vision, n/v, chest pain, pain/numbness/tingling in all extremities. No urinary or defecation difficulties, fall, or weakness. Has L back pain around "T4-T6" regions. Headache is on/off and noted to be diffuse mostly on top of the head that improves with Advil. No aggravating position or factors noted. Lives at home with family, no sick contacts, recent vaccination of meningococcal B last on 10/6, does not take aspirin/plavix/other blood thinners, does not have hx of prior stroke or seizure or use of AEDs.  41 y.o. 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 Resendiz at [address] [place] [address] and Dr. Gale at [place]), and hypothyroidism presented to the ED due to two weeks of L back stabbing pain. Neuro consulted for eval of sx for NMO flare per Dr. Resendiz's recommendations. The sx is similar to her previous NMO attack and it started 2 weeks ago with diffuse headache, cold-like sx, throat "sensitivities". On 10/5, pt called Dr. Resendiz to inform about the sx, and pt was advised to be started on 60 mg of prednisone qd but pt did not want to start because she was tapering off from previous hospitalization on 9/10/21. At that time, pt received IV solumedrol 1g x 5 days and sxs improved and was sent home. Pt smokes 3 cigarettes/day on/off for 6 years, social EtOH use, but does not use illicit drugs like marijuana, cocaine, or heroin. Currently denies any headache, double vision or blurry vision, n/v, chest pain, pain/numbness/tingling in all extremities. No urinary or defecation difficulties, fall, or weakness. Had L back pain around "T4-T6" regions per pt. Also has chronic lower back to toe b/l numbness from 9/2020 as residual sx. Headache is on/off and noted to be diffuse mostly on top of the head that improves with Advil. No aggravating position or factors noted. Lives at home with family, no sick contacts, recent vaccination of meningococcal B last on 10/6, does not take aspirin/plavix/other blood thinners, does not have hx of prior stroke or seizure or use of AEDs.  41 y.o. 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 Resendiz at [address] [place] [address] and Dr. Gale at [place]), and hypothyroidism presented to the ED due to two weeks of L back stabbing pain. Neuro consulted for eval of sx for NMO flare per Dr. Resendiz's recommendations. The sx is similar to her previous NMO attack and it started 2 weeks ago with diffuse headache, cold-like sx, throat "sensitivities". On 10/5, pt called Dr. Resendiz to inform about the sx, and pt was advised to be started on 60 mg of prednisone qd but pt did not want to start because she was tapering off from previous hospitalization on 9/10/21. At that time, pt received IV solumedrol 1g x 5 days and sxs improved and was sent home. Pt smokes 3 cigarettes/day on/off for 6 years, social EtOH use, but does not use illicit drugs like marijuana, cocaine, or heroin. Currently denies any headache, double vision or blurry vision, n/v, chest pain, pain/numbness/tingling in all extremities. No urinary or defecation difficulties, fall, or weakness. Had L back pain around "T4-T6" regions per pt. Also has chronic lower back to toe b/l numbness from 9/2020 as residual sx. Headache is on/off and noted to be diffuse mostly on top of the head that improves with Advil. No aggravating position or factors noted. Lives at home with family, no sick contacts, recent vaccination of meningococcal B last on 10/6, does not take aspirin/plavix/other blood thinners, does not have hx of prior stroke or seizure or use of AEDs.     Of note, most recent Dr. Resendiz's chart note from outpt chart indicate that pt reached out to him on 10/5, advised to take 60 mg prednisone, and taper down to 30 mg prednisone until the start of Soliris.

## 2021-10-12 NOTE — H&P ADULT - ASSESSMENT
41 y.o. 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 Resendiz at [address] [place] [address] and Dr. Walker at [place]), and hypothyroidism presented to the ED due to two weeks of L back stabbing pain. 2 weeks back, had diffuse headache improved with advil, no vision changes, cold-like sx with chills and abnormal throat sensation. Not adherent to prednisone 60 mg qd that was recommended by Dr. Resendiz around the time of flare 2 weeks ago. Neurologic exam remains non-focal at this time and did have 3 patellar. No imaging at this time. Prior hospitalization in 9/2021 for similar sx here in [facility] and showed MR thoracic spine with small linear focus of chronic signal abnormality within posterior cord from C4-T1, unchanged and consistent with chronic lesion, no enhancement seen. 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     Impression: Persistent L thoracic level stabbing pain of unclear etiology. Localization could be cervical or thoracic spine. Etiology could be NMO exacerbation vs     Recommendations:  [] MR cervical and thoracic spine w/wo contrast  [] ED and personally spoke with Dr. Resendiz, recommended admission and 1 g IV solumedrol for 5 days (started 10/11- )  [] C/w home medications  [] GI ppx while on steroids  [] POCT while on steroids and ISS  [] C/w outpt f/u with Dr. Resendiz    Case to be discussed with general neurology attending in AM 41 y.o. 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 Resendiz at [address] [place] [address] and Dr. Walker at [place]), and hypothyroidism presented to the ED due to two weeks of L back stabbing pain. 2 weeks back, had diffuse headache improved with advil, no vision changes, cold-like sx with chills and abnormal throat sensation. Not adherent to prednisone 60 mg qd that was recommended by Dr. Resendiz around the time of flare 2 weeks ago. Neurologic exam remains non-focal at this time and did have 3 patellar. No imaging at this time. Prior hospitalization in 9/2021 for similar sx here in [facility] and showed MR thoracic spine with small linear focus of chronic signal abnormality within posterior cord from C4-T1, unchanged and consistent with chronic lesion, no enhancement seen. 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     Impression: Persistent L thoracic level stabbing pain of unclear etiology. Localization could be cervical or thoracic spine. Etiology likely be NMO exacerbation given similarity to prior flare sx    Recommendations:  [] MR cervical and thoracic spine w/wo contrast  [] ED and personally spoke with Dr. Resendiz, recommended admission and 1 g IV solumedrol for 5 days (started 10/11- )  [] C/w home medications, except gabapentin (attributes headache since starting)  [] GI ppx while on steroids  [] Neurocheck and vital q4  [] POCT while on steroids and ISS  [] C/w outpt f/u with Dr. Resendiz    Case to be discussed with general neurology attending in AM 41 y.o. 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 Resendiz at [address] [place] [address] and Dr. Walker at [place]), and hypothyroidism presented to the ED due to two weeks of L back stabbing pain. 2 weeks back, had diffuse headache improved with advil, no vision changes, cold-like sx with chills and abnormal throat sensation. Not adherent to prednisone 60 mg qd that was recommended by Dr. Resendiz around the time of flare 2 weeks ago. Neurologic exam remains non-focal at this time and did have 3 patellar. No imaging at this time. Prior hospitalization in 9/2021 for similar sx here in [facility] and showed MR thoracic spine with small linear focus of chronic signal abnormality within posterior cord from C4-T1, unchanged and consistent with chronic lesion, no enhancement seen. 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     Impression: Persistent L thoracic level stabbing pain of unclear etiology. Localization could be cervical or thoracic spine. Etiology likely be NMO exacerbation given similarity to prior flare sx    Recommendations:  [] MR cervical and thoracic spine w/wo contrast  [] ED and personally spoke with Dr. Resendiz, recommended admission and 1 g IV solumedrol for 5 days (started 10/11- )  [] Will discuss in AM with Dr. Resendiz if Solaris is given in hospital and the use of MR brain as pt had questions  [] C/w home medications, except gabapentin (attributes headache since starting)  [] GI ppx while on steroids  [] Neurocheck and vital q4  [] POCT while on steroids and ISS  [] C/w outpt f/u with Dr. Resendiz    Case to be discussed with general neurology attending in AM 41 y.o. 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 Resendiz at [address] [place] [address] and Dr. Walker at [place]), and hypothyroidism presented to the ED due to two weeks of L back stabbing pain. 2 weeks back, had diffuse headache improved with advil, no vision changes, cold-like sx with chills and abnormal throat sensation. Not adherent to prednisone 60 mg qd that was recommended by Dr. Resendiz around the time of flare 2 weeks ago. Neurologic exam showed b/l numbness to light touch from superior iliac crest to the toes, no tenderness in paraspinal regions b/l, and 3 patellar reflex. No imaging at this time. Prior hospitalization in 9/2021 for similar sx here in [facility] and showed MR thoracic spine with small linear focus of chronic signal abnormality within posterior cord from C4-T1, unchanged and consistent with chronic lesion, no enhancement seen. 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     Impression: Persistent L thoracic level stabbing pain of unclear etiology. Localization could be cervical or thoracic spine. Etiology likely be NMO exacerbation given similarity to prior flare sx    Recommendations:  [] MR cervical and thoracic spine w/wo contrast  [] ED and personally spoke with Dr. Resendiz, recommended admission and 1 g IV solumedrol for 5 days (started 10/11- )  [] Will discuss in AM with Dr. Resendiz if Solaris is given in hospital and the use of MR brain as pt had questions  [] C/w home medications, except gabapentin (attributes headache since starting)  [] GI ppx while on steroids  [] Neurocheck and vital q4  [] POCT while on steroids and ISS  [] C/w outpt f/u with Dr. Resendiz    Case to be discussed with general neurology attending in AM 41 y.o. 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 Resendiz at [address] [place] [address] and Dr. Walker at [place]), and hypothyroidism presented to the ED due to two weeks of L back stabbing pain. 2 weeks back, had diffuse headache improved with advil, no vision changes, cold-like sx with chills and abnormal throat sensation. Not adherent to prednisone 60 mg qd that was recommended by Dr. Resendiz around the time of flare 2 weeks ago. Neurologic exam showed b/l numbness to light touch from iliac crest to the toes, no tenderness in paraspinal regions b/l, and 3 patellar reflex. No imaging at this time. Prior hospitalization in 9/2021 for similar sx here in [facility] and showed MR thoracic spine with small linear focus of chronic signal abnormality within posterior cord from C4-T1, unchanged and consistent with chronic lesion, no enhancement seen. 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     Impression: Persistent L thoracic level stabbing pain of unclear etiology. Localization could be cervical or thoracic spine. Etiology likely be NMO exacerbation given similarity to prior flare sx    Recommendations:  [] MR cervical and thoracic spine w/wo contrast  [] ED and personally spoke with Dr. Resendiz, recommended admission and 1 g IV solumedrol for 5 days (started 10/11- )  [] Will discuss in AM with Dr. Resendiz if Solaris is given in hospital and the use of MR brain as pt had questions  [] C/w home medications, except gabapentin (attributes headache since starting)  [] GI ppx while on steroids  [] Neurocheck and vital q4  [] POCT while on steroids and ISS  [] C/w outpt f/u with Dr. Resendiz    Case to be discussed with general neurology attending in AM 41 y.o. 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 Resendiz at [address] [place] [address] and Dr. Walker at [place]), and hypothyroidism presented to the ED due to two weeks of L back stabbing pain. 2 weeks back, had diffuse headache improved with advil, no vision changes, cold-like sx with chills and abnormal throat sensation. Not adherent to prednisone 60 mg qd that was recommended by Dr. Resendiz around the time of flare 2 weeks ago. Neurologic exam showed b/l numbness to light touch from iliac crest to anterior tibialis to toes, no tenderness to palpation in paraspinal regions b/l, and 3 patellar reflex. No imaging at this time. Prior hospitalization in 9/2021 for similar sx here in [facility] and showed MR thoracic spine with small linear focus of chronic signal abnormality within posterior cord from C4-T1, unchanged and consistent with chronic lesion, no enhancement seen. 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     Impression: Persistent L thoracic level stabbing pain of unclear etiology. Localization could be cervical or thoracic spine. Etiology likely be NMO exacerbation given similarity to prior flare sx    Recommendations:  [] MR cervical and thoracic spine w/wo contrast  [] ED and personally spoke with Dr. Resendiz, recommended admission and 1 g IV solumedrol for 5 days (started 10/11- )  [] Will discuss in AM with Dr. Resendiz if Solaris is given in hospital and the use of MR brain as pt had questions  [] C/w home medications, except gabapentin (attributes headache since starting)  [] GI ppx while on steroids  [] Neurocheck and vital q4  [] POCT while on steroids and ISS  [] C/w outpt f/u with Dr. Resendiz    Case to be discussed with general neurology attending in AM 41 y.o. R-H F with PMH of MG (binding/modulating Abs s/p thymectomy) in remission, NMO (previously on inebilizumab, now getting ready to be on Solaris to start on 10/19, follows with Dr. Resendiz at [address] [place] [address] and Dr. Gale at [place]), and hypothyroidism presented to the ED due to two weeks of L back stabbing pain. 2 weeks back, had diffuse headache improved with advil, no vision changes, cold-like sx with chills and abnormal throat sensation. Not adherent to prednisone 60 mg qd that was recommended by Dr. Resendiz around the time of flare 2 weeks ago. Neurologic exam showed b/l numbness to light touch from iliac crest to anterior tibialis to toes, no tenderness to palpation in paraspinal regions b/l, and 3 patellar reflex. No imaging at this time. Prior hospitalization in 9/2021 for similar sx here in [facility] and showed MR thoracic spine with small linear focus of chronic signal abnormality within posterior cord from C4-T1, unchanged and consistent with chronic lesion, no enhancement seen. 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 Labs significant for HPV + on recent gyn     Impression: L thoracic level stabbing pain of unclear etiology. Localization could be cervical or thoracic spine (particularly dermatome T3-T10 from description of level). Etiology likely be NMO exacerbation given similarity to prior flare sx vs pseudo-exacerbation in the setting of recent vaccination (1st meningococcal ACWY 9/15, 2nd meningococcal B Bexero 10/6 with cold like sx 2 weeks ago) vs r/o other infectious and toxic metabolic    Recommendations:  [] MR cervical and thoracic spine w/wo contrast  [] ED and personally spoke with Dr. Resendiz, recommended admission and 1 g IV solumedrol for 5 days (started 10/11- )  [] Will discuss in AM with Dr. Resendiz if Solaris is given in hospital and the use of MR brain as pt had questions  [] C/w home medications, except gabapentin (attributes headache since starting)  [] GI ppx while on steroids  [] Neurocheck and vital q4  [] POCT while on steroids and ISS  [] C/w outpt f/u with Dr. Resendiz    Case to be discussed with general neurology attending in AM 41 y.o. R-H F with PMH of MG (binding/modulating Abs s/p thymectomy) in remission, NMO (previously on inebilizumab, now getting ready to be on Solaris to start on 10/19, follows with Dr. Resendiz at [address] [place] [address] and Dr. Gale at [place]), and hypothyroidism presented to the ED due to two weeks of L back stabbing pain. 2 weeks back, had diffuse headache improved with advil, no vision changes, cold-like sx with chills and abnormal throat sensation. Not adherent to prednisone 60 mg qd that was recommended by Dr. Resendiz around the time of flare 2 weeks ago. Neurologic exam showed b/l numbness to light touch from iliac crest to anterior tibialis to toes, no tenderness to palpation in paraspinal regions b/l, and 3 patellar reflex. No imaging at this time. Prior hospitalization in 9/2021 for similar sx here in [email or web address] and showed MR thoracic spine with small linear focus of chronic signal abnormality within posterior cord from C4-T1, unchanged and consistent with chronic lesion, no enhancement seen. 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 Labs significant for HPV + on recent gyn     Impression: L thoracic level stabbing pain of unclear etiology. Localization could be cervical or thoracic spine (particularly dermatome T3-T10 from description of level). Etiology likely be NMO exacerbation given similarity to prior flare sx vs pseudo-exacerbation in the setting of recent vaccination (1st meningococcal ACWY 9/15, 2nd meningococcal B Bexero 10/6 with cold like sx 2 weeks ago) vs r/o other infectious and toxic metabolic    Recommendations:  [] MR cervical and thoracic spine w/wo contrast  [] ED and personally spoke with Dr. Resendiz, recommended admission and 1 g IV solumedrol for 5 days (started 10/11- )  [] Will discuss in AM with Dr. Resendiz if Solaris is given in hospital and the use of MR brain as pt had questions  [] C/w home medications, except gabapentin (attributes headache since starting; will discuss if need to taper)  [] GI ppx while on steroids  [] Neurocheck and vital q4  [] POCT while on steroids and ISS  [] C/w outpt f/u with Dr. Resendiz    Case to be discussed with general neurology attending in AM 41 y.o. 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 Resendiz at [address] [place] [address] and Dr. Gale at [place]), and hypothyroidism presented to the ED due to two weeks of L back stabbing pain. 2 weeks back, had diffuse headache improved with advil, no vision changes, cold-like sx with chills and abnormal throat sensation. Not adherent to prednisone 60 mg qd that was recommended by Dr. Resendiz around the time of flare 2 weeks ago. Neurologic exam showed b/l numbness to light touch from iliac crest to anterior tibialis to toes, no tenderness to palpation in paraspinal regions b/l, and 3 patellar reflex. No imaging at this time. Prior hospitalization in 9/2021 for similar sx here in [facility] and showed MR thoracic spine with small linear focus of chronic signal abnormality within posterior cord from C4-T1, unchanged and consistent with chronic lesion, no enhancement seen. 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 Labs significant for HPV + on recent gyn     Impression: L thoracic level stabbing pain of unclear etiology. Localization could be cervical or thoracic spine (particularly dermatome T3-T10 from description of level). Etiology likely be NMO exacerbation given similarity to prior flare sx vs pseudo-exacerbation in the setting of recent vaccination (1st meningococcal ACWY 9/15, 2nd meningococcal B Bexero 10/6 with cold like sx 2 weeks ago) vs r/o other infectious and toxic metabolic    Recommendations:  [] MR cervical and thoracic spine w/wo contrast  [] ED and personally spoke with Dr. Resendiz, recommended admission and 1 g IV solumedrol for 5 days (started 10/11- )  [] Will discuss in AM with Dr. Resendiz if Solaris is given in hospital and the use of MR brain as pt had questions  [] C/w home medications, except gabapentin (attributes headache since starting; will discuss if need to taper)  [] GI ppx while on steroids  [] Neurocheck and vital q4  [] POCT while on steroids and ISS  [] C/w outpt f/u with Dr. Resendiz    Case to be discussed with general neurology attending in AM 41 y.o. 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 Resendiz at [address] [place] [address] and Dr. Gale at [place]), and hypothyroidism presented to the ED due to two weeks of L back stabbing pain. 2 weeks back, had diffuse headache improved with advil, no vision changes, cold-like sx with chills and abnormal throat sensation. Not adherent to prednisone 60 mg qd that was recommended by Dr. Resendiz around the time of flare 2 weeks ago. Neurologic exam showed b/l numbness to light touch from iliac crest to anterior tibialis to toes, no tenderness to palpation in paraspinal regions b/l, and 3 patellar reflex. No imaging at this time. Prior hospitalization in 9/2021 for similar sx here in [facility] and showed MR thoracic spine with small linear focus of chronic signal abnormality within posterior cord from C4-T1, unchanged and consistent with chronic lesion, no enhancement seen. 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 Labs significant for HPV + on recent gyn     Impression: L thoracic level stabbing pain of unclear etiology. Localization could be cervical or thoracic spine (particularly dermatome T3-T10 from description of level). Etiology likely be NMO exacerbation given similarity to prior flare sx vs pseudo-exacerbation in the setting of recent vaccination (1st meningococcal ACWY 9/15, 2nd meningococcal B Bexero 10/6 with cold like sx 2 weeks ago) vs r/o other infectious and toxic metabolic    Recommendations:  [] MR cervical and thoracic spine w/wo contrast  [] ED and personally spoke with Dr. Resendiz, recommended admission and 1 g IV solumedrol for 5 days (started 10/11- )  [] Will discuss in AM with Dr. Resendiz if Solaris is given in hospital and the use of MR brain as pt had questions  [] C/w home medications, except gabapentin (attributes headache since starting; will discuss if need to taper)  [] GI ppx while on steroids  [] Neurocheck and vital q4  [] POCT while on steroids and ISS  [] C/w outpt f/u with Dr. Resendiz    Case discussed with outpt neuroimmunologist Dr. Resendiz. Case to be discussed with general neurology attending in AM 41 y.o. 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 Resendiz at [address] [place] [address] and Dr. Gale at [place]), and hypothyroidism presented to the ED due to two weeks of L back stabbing pain. 2 weeks back, had diffuse headache improved with advil, no vision changes, cold-like sx with chills and abnormal throat sensation. Not adherent to prednisone 60 mg qd that was recommended by Dr. Resendiz around the time of flare 2 weeks ago. Neurologic exam showed b/l numbness to light touch from iliac crest to anterior tibialis to toes, no tenderness to palpation in paraspinal regions b/l, and 3 patellar reflex. No imaging at this time. Prior hospitalization in 9/2021 for similar sx here in [facility] and showed MR thoracic spine with small linear focus of chronic signal abnormality within posterior cord from C4-T1, unchanged and consistent with chronic lesion, no enhancement seen. 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 Labs significant for anion gap 19 -> 21, HPV 18/45 + on recent gyn (was discussing Gardasil as outpt but never got per chart review).    Impression: L thoracic level stabbing pain of unclear etiology. Localization could be cervical or thoracic spine (particularly dermatome T3-T10 from description of level). Etiology likely be NMO exacerbation given similarity to prior flare sx vs pseudo-exacerbation in the setting of recent vaccination (1st meningococcal ACWY 9/15, 2nd meningococcal B Bexero 10/6 with cold like sx 2 weeks ago) vs r/o other infectious and toxic metabolic    Recommendations:  [] MR cervical and thoracic spine w/wo contrast  [] ED and personally spoke with Dr. Resendiz, recommended admission and 1 g IV solumedrol for 5 days (started 10/11- )  [] Will discuss in AM with Dr. Resendiz if Solaris is given in hospital and the use of MR brain as pt had questions  [] C/w home medications, except gabapentin (attributes headache since starting; will discuss if need to taper)  [] GI ppx while on steroids  [] Neurocheck and vital q4  [] POCT while on steroids and ISS  [] C/w outpt f/u with Dr. Resendiz    Case discussed with outpt neuroimmunologist Dr. Resendiz. Case to be discussed with general neurology attending in AM

## 2021-10-12 NOTE — H&P ADULT - NSHPLABSRESULTS_GEN_ALL_CORE
.  LABS:                         12.8   10.39 )-----------( 196      ( 11 Oct 2021 21:30 )             40.7     10-11    137  |  101  |  11  ----------------------------<  115<H>  3.7   |  21<L>  |  0.60    Ca    9.0      11 Oct 2021 22:56  Mg     2.1     10-11    TPro  7.2  /  Alb  4.3  /  TBili  0.3  /  DBili  x   /  AST  30  /  ALT  19  /  AlkPhos  40  10-11              RADIOLOGY, EKG & ADDITIONAL TESTS: Reviewed.
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
## 2021-10-12 NOTE — H&P ADULT - ATTENDING COMMENTS
Patient seen and examined.  She has no motor signs and only minimal sensory deficits referable to thoracic cord.  Will continue 5 day course of solumedrol with close monitoring, plan DC home this friday.

## 2021-10-13 ENCOUNTER — NON-APPOINTMENT (OUTPATIENT)
Age: 41
End: 2021-10-13

## 2021-10-13 LAB
COVID-19 SPIKE DOMAIN AB INTERP: NEGATIVE — SIGNIFICANT CHANGE UP
COVID-19 SPIKE DOMAIN ANTIBODY RESULT: 0.4 U/ML — SIGNIFICANT CHANGE UP
GLUCOSE BLDC GLUCOMTR-MCNC: 137 MG/DL — HIGH (ref 70–99)
GLUCOSE BLDC GLUCOMTR-MCNC: 140 MG/DL — HIGH (ref 70–99)
GLUCOSE BLDC GLUCOMTR-MCNC: 168 MG/DL — HIGH (ref 70–99)
GLUCOSE BLDC GLUCOMTR-MCNC: 239 MG/DL — HIGH (ref 70–99)
SARS-COV-2 IGG+IGM SERPL QL IA: 0.4 U/ML — SIGNIFICANT CHANGE UP
SARS-COV-2 IGG+IGM SERPL QL IA: NEGATIVE — SIGNIFICANT CHANGE UP

## 2021-10-13 PROCEDURE — 99231 SBSQ HOSP IP/OBS SF/LOW 25: CPT

## 2021-10-13 RX ADMIN — Medication 58 MILLIGRAM(S): at 16:43

## 2021-10-13 RX ADMIN — SENNA PLUS 2 TABLET(S): 8.6 TABLET ORAL at 12:38

## 2021-10-13 RX ADMIN — PANTOPRAZOLE SODIUM 40 MILLIGRAM(S): 20 TABLET, DELAYED RELEASE ORAL at 05:26

## 2021-10-13 RX ADMIN — Medication 1 TABLET(S): at 12:39

## 2021-10-13 RX ADMIN — ENOXAPARIN SODIUM 40 MILLIGRAM(S): 100 INJECTION SUBCUTANEOUS at 12:39

## 2021-10-13 RX ADMIN — Medication 4000 UNIT(S): at 12:39

## 2021-10-13 RX ADMIN — Medication 25 MICROGRAM(S): at 05:25

## 2021-10-13 NOTE — PROGRESS NOTE ADULT - SUBJECTIVE AND OBJECTIVE BOX
Patient seen and examined. Offers no complaints    MEDICATIONS  (STANDING):  cholecalciferol 4000 Unit(s) Oral daily  dextrose 40% Gel 15 Gram(s) Oral once  dextrose 5%. 1000 milliLiter(s) (50 mL/Hr) IV Continuous <Continuous>  dextrose 5%. 1000 milliLiter(s) (100 mL/Hr) IV Continuous <Continuous>  dextrose 50% Injectable 25 Gram(s) IV Push once  dextrose 50% Injectable 12.5 Gram(s) IV Push once  dextrose 50% Injectable 25 Gram(s) IV Push once  enoxaparin Injectable 40 milliGRAM(s) SubCutaneous daily  glucagon  Injectable 1 milliGRAM(s) IntraMuscular once  insulin lispro (ADMELOG) corrective regimen sliding scale   SubCutaneous three times a day before meals  insulin lispro (ADMELOG) corrective regimen sliding scale   SubCutaneous at bedtime  levothyroxine 25 MICROGram(s) Oral daily  methylPREDNISolone sodium succinate IVPB 1000 milliGRAM(s) IV Intermittent daily  pantoprazole    Tablet 40 milliGRAM(s) Oral before breakfast  senna 2 Tablet(s) Oral daily  vitamin B complex with vitamin C 1 Tablet(s) Oral daily        Vital Signs Last 24 Hrs  T(C): 36.6 (13 Oct 2021 08:28), Max: 36.8 (13 Oct 2021 00:02)  T(F): 97.9 (13 Oct 2021 08:28), Max: 98.3 (13 Oct 2021 00:02)  HR: 100 (13 Oct 2021 08:28) (97 - 107)  BP: 114/76 (13 Oct 2021 08:28) (100/64 - 114/82)  BP(mean): --  RR: 18 (13 Oct 2021 08:28) (18 - 18)  SpO2: 96% (13 Oct 2021 08:28) (95% - 97%)        Constitutional: awake and alert.  HEENT: PERRLA, EOMI,   Neck: Supple.  Respiratory: Breath sounds are clear bilaterally  Cardiovascular: S1 and S2, regular / irregular rhythm  Gastrointestinal: soft, nontender  Extremities:  no edema  Vascular: Carotid Bruit - no  Musculoskeletal: no joint swelling/tenderness, no abnormal movements  Skin: No rashes    Neurological exam:  Mental status: A x O x 3. Appropriately interactive, normal affect. Speech fluent, No Aphasia or paraphasic errors. Naming /repetition intact   CN: PERRL, EOMI, VFF, facial sensation normal, no NLFD, tongue midline, Palate moves equally, SCM equal bilaterally  Motor: No pronator drift, Strength 5/5 in all 4 ext, normal bulk and tone, no tremor, rigidity or bradykinesia.    Sens: Intact to light touch, JPS, vib   Reflexes: Symmetric and normal, BJ 2+, TJ 2+, BR 2+, KJ 2+, AJ 2+, downgoing toes b/l  Coord:  No FNFA, dysmetria, PATITO intact   Gait/Balance: Normal gait      Labs:                        13.1   12.58 )-----------( 230      ( 12 Oct 2021 06:13 )             40.2     10-12    137  |  101  |  13  ----------------------------<  172<H>  4.1   |  19<L>  |  0.53    Ca    9.8      12 Oct 2021 06:12  Mg     2.1     10-11    TPro  7.2  /  Alb  4.3  /  TBili  0.3  /  DBili  x   /  AST  30  /  ALT  19  /  AlkPhos  40  10-11            A/P: NMO with flare of sensory symtpoms    cont solumedrol, plan DC home friday

## 2021-10-14 ENCOUNTER — TRANSCRIPTION ENCOUNTER (OUTPATIENT)
Age: 41
End: 2021-10-14

## 2021-10-14 LAB
GLUCOSE BLDC GLUCOMTR-MCNC: 105 MG/DL — HIGH (ref 70–99)
GLUCOSE BLDC GLUCOMTR-MCNC: 116 MG/DL — HIGH (ref 70–99)
GLUCOSE BLDC GLUCOMTR-MCNC: 198 MG/DL — HIGH (ref 70–99)
GLUCOSE BLDC GLUCOMTR-MCNC: 92 MG/DL — SIGNIFICANT CHANGE UP (ref 70–99)

## 2021-10-14 PROCEDURE — 99231 SBSQ HOSP IP/OBS SF/LOW 25: CPT

## 2021-10-14 RX ADMIN — ENOXAPARIN SODIUM 40 MILLIGRAM(S): 100 INJECTION SUBCUTANEOUS at 11:24

## 2021-10-14 RX ADMIN — Medication 25 MICROGRAM(S): at 06:42

## 2021-10-14 RX ADMIN — Medication 1 TABLET(S): at 11:23

## 2021-10-14 RX ADMIN — Medication 4000 UNIT(S): at 11:23

## 2021-10-14 RX ADMIN — PANTOPRAZOLE SODIUM 40 MILLIGRAM(S): 20 TABLET, DELAYED RELEASE ORAL at 06:42

## 2021-10-14 RX ADMIN — Medication 58 MILLIGRAM(S): at 17:08

## 2021-10-14 NOTE — DISCHARGE NOTE PROVIDER - REASON FOR ADMISSION
left flank/back pain, squeezing, burning, progressively worsening x2 weeks w/o radiation similar to NMO flare sx

## 2021-10-14 NOTE — DISCHARGE NOTE PROVIDER - NSDCFUSCHEDAPPT_GEN_ALL_CORE_FT
BALDO CAMILO ; 10/20/2021 ; [facility]  [facility]  BALDO CAMILO ; 11/03/2021 ; [facility]  [facility]  BALDO CAMILO ; 11/15/2021 ; [facility]

## 2021-10-14 NOTE — DISCHARGE NOTE PROVIDER - NSDCCPCAREPLAN_GEN_ALL_CORE_FT
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 You were treated with 5 days of high dose IV steroids for a possible NMO flare, however your MRI of your neck and chest spine do not show any new lesions or enchaning lesions.       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 You were treated with 5 days of high dose IV steroids for a possible NMO flare, however your MRI of your neck and chest spine do not show any new lesions or enchaning lesions. Please follow up with Dr. Resendiz for further monitoring of your NMO.       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 You were treated with 5 days of high dose IV steroids for a possible NMO flare, however your MRI of your neck and chest spine do not show any new lesions or enchaning lesions. Please follow up with Dr. Resendiz for further monitoring of your NMO within 1-2 weeks. Your home lyrica was increased to help with the tingling.

## 2021-10-14 NOTE — DISCHARGE NOTE PROVIDER - HOSPITAL COURSE
HPI:  41 y.o. 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 Resendiz at [address] [place] [address] and Dr. Gale at [facility]), and hypothyroidism presented to the ED due to two weeks of L back stabbing pain. Neuro consulted for eval of sx for NMO flare per Dr. Resendiz's recommendations. The sx is similar to her previous NMO attack and it started 2 weeks ago with diffuse headache, cold-like sx, throat "sensitivities". On 10/5, pt called Dr. Resendiz to inform about the sx, and pt was advised to be started on 60 mg of prednisone qd but pt did not want to start because she was tapering off from previous hospitalization on 9/10/21. At that time, pt received IV solumedrol 1g x 5 days and sxs improved and was sent home. Pt smokes 3 cigarettes/day on/off for 6 years, social EtOH use, but does not use illicit drugs like marijuana, cocaine, or heroin. Currently denies any headache, double vision or blurry vision, n/v, chest pain, pain/numbness/tingling in all extremities. No urinary or defecation difficulties, fall, or weakness. Had L back pain around "T4-T6" regions per pt. Also has chronic lower back to toe b/l numbness from 9/2020 as residual sx. Headache is on/off and noted to be diffuse mostly on top of the head that improves with Advil. No aggravating position or factors noted. Lives at home with family, no sick contacts, recent vaccination of meningococcal B last on 10/6, does not take aspirin/plavix/other blood thinners, does not have hx of prior stroke or seizure or use of AEDs.     Of note, most recent Dr. Resendiz's chart note from outpt chart indicate that pt reached out to him on 10/5, advised to take 60 mg prednisone, and taper down to 30 mg prednisone until the start of Soliris.    Hospital Course:  Pt treated with 5 days of IV solumedrol for possible NMO flare, however MRI of cervical and thoracic spine did not show any active enhancing lesions. Pt non-adherent with outpatient steroids. Pt not discharged on any steroid taper    MR Cervical Spine w/wo IV Cont (10.12.21 @ 13:06)  COMPARISON: MRI of the cervical and thoracic spine, 9/10/2021.    FINDINGS:  Cervical spine:  There is maintenance of the usual cervical lordosis without fracture or traumatic malalignment. The vertebral body heights are maintained. The intervertebral disc spaces demonstrate multilevel loss of height. There is a vertebral hemangioma in the superior aspect of C7, stable.  The pre and paravertebral soft tissues are within limits of normal.  There are multilevel disc osteophytes from C3-C4 to C6-C7, worse at C6-C7 which extends into the left subarticular zone contributing to mild to moderate central canal narrowing at this level and proximal left neural foraminal narrowing.  There is no pathologic enhancement in the cord or in the epidural space.  The previously seen T2/STIR signal hyperintensity extending from C4 to lower border of C7 has decreased in signal intensity and in craniocaudal extent, now extending to C6.No pathologic enhancement is seen corresponding to the signal hyperintensity.      Thoracic spine:  There is maintenance of the usual thoracic kyphosis without fracture or traumatic malalignment. The vertebral body heights are maintained. The intervertebral disc spaces are also maintained. The bone marrow signal is homogeneous throughout.  There is no pathologic enhancement in the pre or paravertebral soft tissues.  Again noted is the multicystic thyroid lobe nodule measuring 1.6 x 1.4 cm, unchanged since prior comparison and amenable to ultrasound evaluation on outpatient basis as clinically warranted.  The previously seen T2/STIR hyperintense holocord lesion extending from T3 to T7 with tapered ends and focal enhancement at T4-T5 demonstrates significant decrease in extent, now present only in a short segment extending from T5-T6 without significant cord expansion or enhancement. Streaky T2 hyperintensity in right hemicord in the lower border of T6 may represent myelomalacia.  No new lesions are identified in the rest of the spinal cord.      IMPRESSION:  Compared to the previous examination,  There is significant decrease in size and extent of the thoracic cord lesion. The cervical cord lesion has also decreased in signal intensity and extent.  No enhancement is appreciated in either cervical or thoracic cord lesions. No new lesions are identified.       HPI:  41 y.o. 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 Resendiz at [address] [place] [address] and Dr. Gale at [facility]), and hypothyroidism presented to the ED due to two weeks of L back stabbing pain. Neuro consulted for eval of sx for NMO flare per Dr. Resendiz's recommendations. The sx is similar to her previous NMO attack and it started 2 weeks ago with diffuse headache, cold-like sx, throat "sensitivities". On 10/5, pt called Dr. Resendiz to inform about the sx, and pt was advised to be started on 60 mg of prednisone qd but pt did not want to start because she was tapering off from previous hospitalization on 9/10/21. At that time, pt received IV solumedrol 1g x 5 days and sxs improved and was sent home. Pt smokes 3 cigarettes/day on/off for 6 years, social EtOH use, but does not use illicit drugs like marijuana, cocaine, or heroin. Currently denies any headache, double vision or blurry vision, n/v, chest pain, pain/numbness/tingling in all extremities. No urinary or defecation difficulties, fall, or weakness. Had L back pain around "T4-T6" regions per pt. Also has chronic lower back to toe b/l numbness from 9/2020 as residual sx. Headache is on/off and noted to be diffuse mostly on top of the head that improves with Advil. No aggravating position or factors noted. Lives at home with family, no sick contacts, recent vaccination of meningococcal B last on 10/6, does not take aspirin/plavix/other blood thinners, does not have hx of prior stroke or seizure or use of AEDs.     Of note, most recent Dr. Resendiz's chart note from outpt chart indicate that pt reached out to him on 10/5, advised to take 60 mg prednisone, and taper down to 30 mg prednisone until the start of Soliris.    Hospital Course:  Pt treated with 5 days of IV solumedrol for possible NMO flare, however MRI of cervical and thoracic spine did not show any active enhancing lesions. Pt non-adherent with outpatient steroids. Pt not discharged on any steroid taper. Pt instructed to follow up with her outpatient neurologist Dr. Resendiz.    MR Cervical/Thoracic Spine w/wo IV Cont (10.12.21 @ 13:06)  COMPARISON: MRI of the cervical and thoracic spine, 9/10/2021.    FINDINGS:  Cervical spine:  There is maintenance of the usual cervical lordosis without fracture or traumatic malalignment. The vertebral body heights are maintained. The intervertebral disc spaces demonstrate multilevel loss of height. There is a vertebral hemangioma in the superior aspect of C7, stable.  The pre and paravertebral soft tissues are within limits of normal.  There are multilevel disc osteophytes from C3-C4 to C6-C7, worse at C6-C7 which extends into the left subarticular zone contributing to mild to moderate central canal narrowing at this level and proximal left neural foraminal narrowing.  There is no pathologic enhancement in the cord or in the epidural space.  The previously seen T2/STIR signal hyperintensity extending from C4 to lower border of C7 has decreased in signal intensity and in craniocaudal extent, now extending to C6.No pathologic enhancement is seen corresponding to the signal hyperintensity.      Thoracic spine:  There is maintenance of the usual thoracic kyphosis without fracture or traumatic malalignment. The vertebral body heights are maintained. The intervertebral disc spaces are also maintained. The bone marrow signal is homogeneous throughout.  There is no pathologic enhancement in the pre or paravertebral soft tissues.  Again noted is the multicystic thyroid lobe nodule measuring 1.6 x 1.4 cm, unchanged since prior comparison and amenable to ultrasound evaluation on outpatient basis as clinically warranted.  The previously seen T2/STIR hyperintense holocord lesion extending from T3 to T7 with tapered ends and focal enhancement at T4-T5 demonstrates significant decrease in extent, now present only in a short segment extending from T5-T6 without significant cord expansion or enhancement. Streaky T2 hyperintensity in right hemicord in the lower border of T6 may represent myelomalacia.  No new lesions are identified in the rest of the spinal cord.      IMPRESSION:  Compared to the previous examination,  There is significant decrease in size and extent of the thoracic cord lesion. The cervical cord lesion has also decreased in signal intensity and extent.  No enhancement is appreciated in either cervical or thoracic cord lesions. No new lesions are identified.       HPI:  41 y.o. 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 Resendiz at [address] [place] [address] and Dr. Gale at [place]), and hypothyroidism presented to the ED due to two weeks of L back stabbing pain. Neuro consulted for eval of sx for NMO flare per Dr. Resendiz's recommendations. The sx is similar to her previous NMO attack and it started 2 weeks ago with diffuse headache, cold-like sx, throat "sensitivities". On 10/5, pt called Dr. Resendiz to inform about the sx, and pt was advised to be started on 60 mg of prednisone qd but pt did not want to start because she was tapering off from previous hospitalization on 9/10/21. At that time, pt received IV solumedrol 1g x 5 days and sxs improved and was sent home. Pt smokes 3 cigarettes/day on/off for 6 years, social EtOH use, but does not use illicit drugs like marijuana, cocaine, or heroin. Currently denies any headache, double vision or blurry vision, n/v, chest pain, pain/numbness/tingling in all extremities. No urinary or defecation difficulties, fall, or weakness. Had L back pain around "T4-T6" regions per pt. Also has chronic lower back to toe b/l numbness from 9/2020 as residual sx. Headache is on/off and noted to be diffuse mostly on top of the head that improves with Advil. No aggravating position or factors noted. Lives at home with family, no sick contacts, recent vaccination of meningococcal B last on 10/6, does not take aspirin/plavix/other blood thinners, does not have hx of prior stroke or seizure or use of AEDs.     Of note, most recent Dr. Resendiz's chart note from outpt chart indicate that pt reached out to him on 10/5, advised to take 60 mg prednisone, and taper down to 30 mg prednisone until the start of Soliris.    Hospital Course:  Pt treated with 5 days of IV solumedrol for possible NMO flare, however MRI of cervical and thoracic spine did not show any active enhancing lesions. Pt non-adherent with outpatient steroids. Pt not discharged on any steroid taper. Pt instructed to follow up with her outpatient neurologist Dr. Resendiz within 1-2 weeks. Pt's home lyrica increased.    MR Cervical/Thoracic Spine w/wo IV Cont (10.12.21 @ 13:06)  COMPARISON: MRI of the cervical and thoracic spine, 9/10/2021.    FINDINGS:  Cervical spine:  There is maintenance of the usual cervical lordosis without fracture or traumatic malalignment. The vertebral body heights are maintained. The intervertebral disc spaces demonstrate multilevel loss of height. There is a vertebral hemangioma in the superior aspect of C7, stable.  The pre and paravertebral soft tissues are within limits of normal.  There are multilevel disc osteophytes from C3-C4 to C6-C7, worse at C6-C7 which extends into the left subarticular zone contributing to mild to moderate central canal narrowing at this level and proximal left neural foraminal narrowing.  There is no pathologic enhancement in the cord or in the epidural space.  The previously seen T2/STIR signal hyperintensity extending from C4 to lower border of C7 has decreased in signal intensity and in craniocaudal extent, now extending to C6.No pathologic enhancement is seen corresponding to the signal hyperintensity.      Thoracic spine:  There is maintenance of the usual thoracic kyphosis without fracture or traumatic malalignment. The vertebral body heights are maintained. The intervertebral disc spaces are also maintained. The bone marrow signal is homogeneous throughout.  There is no pathologic enhancement in the pre or paravertebral soft tissues.  Again noted is the multicystic thyroid lobe nodule measuring 1.6 x 1.4 cm, unchanged since prior comparison and amenable to ultrasound evaluation on outpatient basis as clinically warranted.  The previously seen T2/STIR hyperintense holocord lesion extending from T3 to T7 with tapered ends and focal enhancement at T4-T5 demonstrates significant decrease in extent, now present only in a short segment extending from T5-T6 without significant cord expansion or enhancement. Streaky T2 hyperintensity in right hemicord in the lower border of T6 may represent myelomalacia.  No new lesions are identified in the rest of the spinal cord.      IMPRESSION:  Compared to the previous examination,  There is significant decrease in size and extent of the thoracic cord lesion. The cervical cord lesion has also decreased in signal intensity and extent.  No enhancement is appreciated in either cervical or thoracic cord lesions. No new lesions are identified.

## 2021-10-14 NOTE — PROGRESS NOTE ADULT - SUBJECTIVE AND OBJECTIVE BOX
SUBJECTIVE: No acute events overnight. Pt seen and examined at bedside    INTERVAL HISTORY:    PAST MEDICAL & SURGICAL HISTORY:  Hypothyroid    Anxiety    Myasthenia gravis    Neuromyelitis optica    MG status post thymectomy (myasthenia gravis)  2005    History of appendectomy  2001      FAMILY HISTORY:    SOCIAL HISTORY:   T/E/D:   Occupation:   Lives with:     MEDICATIONS (HOME):  Home Medications:  Vitamin B Complex with C oral tablet: 1 tab(s) orally once a day (12 Oct 2021 01:24)    MEDICATIONS  (STANDING):  cholecalciferol 4000 Unit(s) Oral daily  dextrose 40% Gel 15 Gram(s) Oral once  dextrose 5%. 1000 milliLiter(s) (50 mL/Hr) IV Continuous <Continuous>  dextrose 5%. 1000 milliLiter(s) (100 mL/Hr) IV Continuous <Continuous>  dextrose 50% Injectable 25 Gram(s) IV Push once  dextrose 50% Injectable 12.5 Gram(s) IV Push once  dextrose 50% Injectable 25 Gram(s) IV Push once  enoxaparin Injectable 40 milliGRAM(s) SubCutaneous daily  glucagon  Injectable 1 milliGRAM(s) IntraMuscular once  insulin lispro (ADMELOG) corrective regimen sliding scale   SubCutaneous three times a day before meals  insulin lispro (ADMELOG) corrective regimen sliding scale   SubCutaneous at bedtime  levothyroxine 25 MICROGram(s) Oral daily  methylPREDNISolone sodium succinate IVPB 1000 milliGRAM(s) IV Intermittent daily  pantoprazole    Tablet 40 milliGRAM(s) Oral before breakfast  senna 2 Tablet(s) Oral daily  vitamin B complex with vitamin C 1 Tablet(s) Oral daily    MEDICATIONS  (PRN):    ALLERGIES/INTOLERANCES:  Allergies  No Known Allergies    Intolerances    VITALS & EXAMINATION:  Vital Signs Last 24 Hrs  T(C): 36.7 (14 Oct 2021 05:00), Max: 36.8 (13 Oct 2021 11:40)  T(F): 98.1 (14 Oct 2021 05:00), Max: 98.2 (13 Oct 2021 11:40)  HR: 85 (14 Oct 2021 05:00) (58 - 100)  BP: 115/62 (14 Oct 2021 05:00) (101/67 - 128/75)  BP(mean): --  RR: 18 (14 Oct 2021 05:00) (18 - 18)  SpO2: 98% (14 Oct 2021 05:00) (96% - 98%)    Constitutional: awake and alert.  HEENT: PERRLA, EOMI,   Neck: Supple.  Respiratory: Breath sounds are clear bilaterally  Gastrointestinal: soft, nontender  Extremities:  no edema  Musculoskeletal: no joint swelling/tenderness, no abnormal movements  Skin: No rashes    Neurological exam:  Mental status: A x O x 3. Appropriately interactive, normal affect. Speech fluent, No Aphasia or paraphasic errors. Naming /repetition intact   CN: PERRL, EOMI, VFF, facial sensation normal, no NLFD, tongue midline, Palate moves equally, SCM equal bilaterally  Motor: No pronator drift, normal bulk and tone, no tremor, rigidity or bradykinesia.    Sens: Intact to light touch  Coord:  No FNFA, dysmetria, PATITO intact   Gait/Balance: Normal gait    LABORATORY:  CBC   Chem       LFTs   Coagulopathy   Lipid Panel   A1c   Cardiac enzymes     U/A   CSF  Immunological  Other    STUDIES & IMAGING:  Studies (EKG, EEG, EMG, etc):     Radiology (XR, CT, MR, U/S, TTE/KEIRA):    < from: MR Cervical Spine w/wo IV Cont (10.12.21 @ 13:06) >    IMPRESSION:    Compared to the previous examination,    There is significant decrease in size and extent of the thoracic cord lesion. The cervical cord lesion has also decreased in signal intensity and extent.    No enhancement is appreciated in either cervical or thoracic cord lesions. No new lesions are identified.      < end of copied text >   SUBJECTIVE: No acute events overnight. Pt seen and examined at bedside. She still reports numbness below T6.     INTERVAL HISTORY:    PAST MEDICAL & SURGICAL HISTORY:  Hypothyroid    Anxiety    Myasthenia gravis    Neuromyelitis optica    MG status post thymectomy (myasthenia gravis)  2005    History of appendectomy  2001      FAMILY HISTORY:    SOCIAL HISTORY:   T/E/D:   Occupation:   Lives with:     MEDICATIONS (HOME):  Home Medications:  Vitamin B Complex with C oral tablet: 1 tab(s) orally once a day (12 Oct 2021 01:24)    MEDICATIONS  (STANDING):  cholecalciferol 4000 Unit(s) Oral daily  dextrose 40% Gel 15 Gram(s) Oral once  dextrose 5%. 1000 milliLiter(s) (50 mL/Hr) IV Continuous <Continuous>  dextrose 5%. 1000 milliLiter(s) (100 mL/Hr) IV Continuous <Continuous>  dextrose 50% Injectable 25 Gram(s) IV Push once  dextrose 50% Injectable 12.5 Gram(s) IV Push once  dextrose 50% Injectable 25 Gram(s) IV Push once  enoxaparin Injectable 40 milliGRAM(s) SubCutaneous daily  glucagon  Injectable 1 milliGRAM(s) IntraMuscular once  insulin lispro (ADMELOG) corrective regimen sliding scale   SubCutaneous three times a day before meals  insulin lispro (ADMELOG) corrective regimen sliding scale   SubCutaneous at bedtime  levothyroxine 25 MICROGram(s) Oral daily  methylPREDNISolone sodium succinate IVPB 1000 milliGRAM(s) IV Intermittent daily  pantoprazole    Tablet 40 milliGRAM(s) Oral before breakfast  senna 2 Tablet(s) Oral daily  vitamin B complex with vitamin C 1 Tablet(s) Oral daily    MEDICATIONS  (PRN):    ALLERGIES/INTOLERANCES:  Allergies  No Known Allergies    Intolerances    VITALS & EXAMINATION:  Vital Signs Last 24 Hrs  T(C): 36.7 (14 Oct 2021 05:00), Max: 36.8 (13 Oct 2021 11:40)  T(F): 98.1 (14 Oct 2021 05:00), Max: 98.2 (13 Oct 2021 11:40)  HR: 85 (14 Oct 2021 05:00) (58 - 100)  BP: 115/62 (14 Oct 2021 05:00) (101/67 - 128/75)  BP(mean): --  RR: 18 (14 Oct 2021 05:00) (18 - 18)  SpO2: 98% (14 Oct 2021 05:00) (96% - 98%)    Constitutional: awake and alert.  HEENT: PERRLA, EOMI,   Neck: Supple.  Respiratory: Breath sounds are clear bilaterally  Gastrointestinal: soft, nontender  Extremities:  no edema  Musculoskeletal: no joint swelling/tenderness, no abnormal movements  Skin: No rashes    Neurological exam:  Mental status: A x O x 3. Appropriately interactive, normal affect. Speech fluent, No Aphasia or paraphasic errors. Naming /repetition intact   CN: PERRL, EOMI, VFF, facial sensation normal, no NLFD, tongue midline, Palate moves equally, SCM equal bilaterally  Motor: No pronator drift, normal bulk and tone, no tremor, rigidity or bradykinesia.    Sens: Intact to light touch  Coord:  No FNFA, dysmetria, PATITO intact   Gait/Balance: Normal gait    LABORATORY:  CBC   Chem       LFTs   Coagulopathy   Lipid Panel   A1c   Cardiac enzymes     U/A   CSF  Immunological  Other    STUDIES & IMAGING:  Studies (EKG, EEG, EMG, etc):     Radiology (XR, CT, MR, U/S, TTE/KEIRA):    < from: MR Cervical Spine w/wo IV Cont (10.12.21 @ 13:06) >    IMPRESSION:    Compared to the previous examination,    There is significant decrease in size and extent of the thoracic cord lesion. The cervical cord lesion has also decreased in signal intensity and extent.    No enhancement is appreciated in either cervical or thoracic cord lesions. No new lesions are identified.      < end of copied text >   SUBJECTIVE: No acute events overnight. Pt seen and examined at bedside. She still reports numbness below T6. She denies any new symptoms, no issues with sleep.    PAST MEDICAL & SURGICAL HISTORY:  Hypothyroid    Anxiety    Myasthenia gravis    Neuromyelitis optica    MG status post thymectomy (myasthenia gravis)  2005    History of appendectomy  2001      FAMILY HISTORY:    SOCIAL HISTORY:   T/E/D:   Occupation:   Lives with:     MEDICATIONS (HOME):  Home Medications:  Vitamin B Complex with C oral tablet: 1 tab(s) orally once a day (12 Oct 2021 01:24)    MEDICATIONS  (STANDING):  cholecalciferol 4000 Unit(s) Oral daily  dextrose 40% Gel 15 Gram(s) Oral once  dextrose 5%. 1000 milliLiter(s) (50 mL/Hr) IV Continuous <Continuous>  dextrose 5%. 1000 milliLiter(s) (100 mL/Hr) IV Continuous <Continuous>  dextrose 50% Injectable 25 Gram(s) IV Push once  dextrose 50% Injectable 12.5 Gram(s) IV Push once  dextrose 50% Injectable 25 Gram(s) IV Push once  enoxaparin Injectable 40 milliGRAM(s) SubCutaneous daily  glucagon  Injectable 1 milliGRAM(s) IntraMuscular once  insulin lispro (ADMELOG) corrective regimen sliding scale   SubCutaneous three times a day before meals  insulin lispro (ADMELOG) corrective regimen sliding scale   SubCutaneous at bedtime  levothyroxine 25 MICROGram(s) Oral daily  methylPREDNISolone sodium succinate IVPB 1000 milliGRAM(s) IV Intermittent daily  pantoprazole    Tablet 40 milliGRAM(s) Oral before breakfast  senna 2 Tablet(s) Oral daily  vitamin B complex with vitamin C 1 Tablet(s) Oral daily    MEDICATIONS  (PRN):    ALLERGIES/INTOLERANCES:  Allergies  No Known Allergies    Intolerances    VITALS & EXAMINATION:  Vital Signs Last 24 Hrs  T(C): 36.7 (14 Oct 2021 05:00), Max: 36.8 (13 Oct 2021 11:40)  T(F): 98.1 (14 Oct 2021 05:00), Max: 98.2 (13 Oct 2021 11:40)  HR: 85 (14 Oct 2021 05:00) (58 - 100)  BP: 115/62 (14 Oct 2021 05:00) (101/67 - 128/75)  BP(mean): --  RR: 18 (14 Oct 2021 05:00) (18 - 18)  SpO2: 98% (14 Oct 2021 05:00) (96% - 98%)    Constitutional: awake and alert.  HEENT: PERRLA, EOMI  Neck: Supple.  Respiratory: No increased work of breathing  Gastrointestinal: soft, nontender  Skin: No rashes    Neurological exam:  Mental status: AO x 3. Appropriately interactive, normal affect. Speech fluent, No Aphasia or paraphasic errors. Naming /repetition intact   CN: PERRL, EOMI  Motor: No pronator drift, normal bulk and tone, no tremor, rigidity or bradykinesia.    Sens: Intact to light touch  Coord:  No FTN dysmetria  Gait/Balance: deferred    LABORATORY:    STUDIES & IMAGING:  Studies (EKG, EEG, EMG, etc):     Radiology (XR, CT, MR, U/S, TTE/KEIRA):    < from: MR Cervical Spine w/wo IV Cont (10.12.21 @ 13:06) >    IMPRESSION:    Compared to the previous examination,    There is significant decrease in size and extent of the thoracic cord lesion. The cervical cord lesion has also decreased in signal intensity and extent.    No enhancement is appreciated in either cervical or thoracic cord lesions. No new lesions are identified.      < end of copied text >

## 2021-10-14 NOTE — DISCHARGE NOTE PROVIDER - NSDCMRMEDTOKEN_GEN_ALL_CORE_FT
Lyrica 50 mg oral capsule: 1 cap(s) orally 2 times a day MDD:100mg  senna oral tablet: 2 tab(s) orally once a day   Synthroid 25 mcg (0.025 mg) oral tablet: 1 tab(s) orally once a day in the morning before breakfast  Vitamin B Complex with C oral tablet: 1 tab(s) orally once a day  Vitamin B Complex with C oral tablet: 1 tab(s) orally once a day  Vitamin D2 50 mcg (2000 intl units) oral capsule: 2 cap(s) orally 2 times a day    Lyrica 75 mg oral capsule: 1 cap(s) orally 2 times a day MDD:2 capsules  senna oral tablet: 2 tab(s) orally once a day   Synthroid 25 mcg (0.025 mg) oral tablet: 1 tab(s) orally once a day in the morning before breakfast  Vitamin B Complex with C oral tablet: 1 tab(s) orally once a day  Vitamin B Complex with C oral tablet: 1 tab(s) orally once a day  Vitamin D2 50 mcg (2000 intl units) oral capsule: 2 cap(s) orally 2 times a day

## 2021-10-14 NOTE — PROGRESS NOTE ADULT - ASSESSMENT
41 y.o. 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 Resendiz at [address] [place] [address] and Dr. Gale at [place]), and hypothyroidism presented to the ED due to two weeks of L back stabbing pain. Not adherent to prednisone 60 mg qd that was recommended by Dr. Resendiz around the time of flare 2 weeks ago. Neurologic exam showed b/l numbness to light touch from iliac crest to anterior tibialis to toes, no tenderness to palpation in paraspinal regions b/l, and 3 patellar reflex. MR thoracic spine 9/2021 with small linear focus of chronic signal abnormality within posterior cord from C4-T1, unchanged, no enhancement. Abnormal signal/edema seen from T3-T7 associated with cord expansion and new enhancement.    Impression: L thoracic level stabbing pain of unclear etiology. Localization could be cervical or thoracic spine (particularly dermatome T3-T10 from description of level). Etiology likely be pseudo-exacerbation in the setting of recent vaccination (1st meningococcal ACWY 9/15, 2nd meningococcal B Bexero 10/6 with cold like sx 2 weeks ago)      [x] MR cervical and thoracic spine w/wo contrast - significant decrease in size and extent of thoracic and cervical cord lesions, no active enhancement  []  1 g IV solumedrol for 5 days (started 10/11- )  [] C/w home medications, except gabapentin (attributes headache since starting; will discuss if need to taper)  [] GI ppx while on steroids  [] Neurocheck and vital q4  [] POCT while on steroids and ISS  [] outpt f/u with Dr. Resendiz   41 y.o. 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 Resendiz at [address] [place] [address] and Dr. Gale at [place]), and hypothyroidism presented to the ED due to two weeks of L back stabbing pain. Not adherent to prednisone 60 mg qd that was recommended by Dr. Resendiz around the time of flare 2 weeks ago. Neurologic exam showed b/l numbness to light touch from iliac crest to anterior tibialis to toes, no tenderness to palpation in paraspinal regions b/l, and 3 patellar reflex. MR thoracic spine 9/2021 with small linear focus of chronic signal abnormality within posterior cord from C4-T1, unchanged, no enhancement. Abnormal signal/edema seen from T3-T7 associated with cord expansion and new enhancement.    Impression: L thoracic level stabbing pain of unclear etiology. Localization could be cervical or thoracic spine (particularly dermatome T3-T10 from description of level). Etiology likely be pseudo-exacerbation in the setting of recent vaccination    [x] MR cervical and thoracic spine w/wo contrast - significant decrease in size and extent of thoracic and cervical cord lesions, no active enhancement  []  1 g IV solumedrol for 5 days (started 10/11- )  [] C/w home medications, except gabapentin (attributes headache since starting; will discuss if need to taper)  [] GI ppx while on steroids  [] Neurocheck and vital q4  [] POCT while on steroids and ISS  [] outpt f/u with Dr. Resendiz

## 2021-10-14 NOTE — DISCHARGE NOTE PROVIDER - CARE PROVIDER_API CALL
Jennifer Resendiz)  Neurology  [address] [place] [address]  Phone: [phone]  Fax: [phone]  Established Patient  Follow Up Time: 2 weeks

## 2021-10-15 ENCOUNTER — TRANSCRIPTION ENCOUNTER (OUTPATIENT)
Age: 41
End: 2021-10-15

## 2021-10-15 VITALS
SYSTOLIC BLOOD PRESSURE: 108 MMHG | OXYGEN SATURATION: 95 % | RESPIRATION RATE: 18 BRPM | TEMPERATURE: 98 F | DIASTOLIC BLOOD PRESSURE: 73 MMHG

## 2021-10-15 LAB
GLUCOSE BLDC GLUCOMTR-MCNC: 103 MG/DL — HIGH (ref 70–99)
GLUCOSE BLDC GLUCOMTR-MCNC: 135 MG/DL — HIGH (ref 70–99)

## 2021-10-15 PROCEDURE — 85027 COMPLETE CBC AUTOMATED: CPT

## 2021-10-15 PROCEDURE — 83036 HEMOGLOBIN GLYCOSYLATED A1C: CPT

## 2021-10-15 PROCEDURE — 86769 SARS-COV-2 COVID-19 ANTIBODY: CPT

## 2021-10-15 PROCEDURE — A9585: CPT

## 2021-10-15 PROCEDURE — 82803 BLOOD GASES ANY COMBINATION: CPT

## 2021-10-15 PROCEDURE — 84132 ASSAY OF SERUM POTASSIUM: CPT

## 2021-10-15 PROCEDURE — 84443 ASSAY THYROID STIM HORMONE: CPT

## 2021-10-15 PROCEDURE — 83605 ASSAY OF LACTIC ACID: CPT

## 2021-10-15 PROCEDURE — 86140 C-REACTIVE PROTEIN: CPT

## 2021-10-15 PROCEDURE — 96374 THER/PROPH/DIAG INJ IV PUSH: CPT

## 2021-10-15 PROCEDURE — 85025 COMPLETE CBC W/AUTO DIFF WBC: CPT

## 2021-10-15 PROCEDURE — 82330 ASSAY OF CALCIUM: CPT

## 2021-10-15 PROCEDURE — 81025 URINE PREGNANCY TEST: CPT

## 2021-10-15 PROCEDURE — 99285 EMERGENCY DEPT VISIT HI MDM: CPT | Mod: 25

## 2021-10-15 PROCEDURE — 84295 ASSAY OF SERUM SODIUM: CPT

## 2021-10-15 PROCEDURE — 84436 ASSAY OF TOTAL THYROXINE: CPT

## 2021-10-15 PROCEDURE — 80053 COMPREHEN METABOLIC PANEL: CPT

## 2021-10-15 PROCEDURE — 83690 ASSAY OF LIPASE: CPT

## 2021-10-15 PROCEDURE — 99238 HOSP IP/OBS DSCHRG MGMT 30/<: CPT

## 2021-10-15 PROCEDURE — 82962 GLUCOSE BLOOD TEST: CPT

## 2021-10-15 PROCEDURE — 85014 HEMATOCRIT: CPT

## 2021-10-15 PROCEDURE — 82947 ASSAY GLUCOSE BLOOD QUANT: CPT

## 2021-10-15 PROCEDURE — 83735 ASSAY OF MAGNESIUM: CPT

## 2021-10-15 PROCEDURE — 87635 SARS-COV-2 COVID-19 AMP PRB: CPT

## 2021-10-15 PROCEDURE — 84480 ASSAY TRIIODOTHYRONINE (T3): CPT

## 2021-10-15 PROCEDURE — 72156 MRI NECK SPINE W/O & W/DYE: CPT

## 2021-10-15 PROCEDURE — 82435 ASSAY OF BLOOD CHLORIDE: CPT

## 2021-10-15 PROCEDURE — 80048 BASIC METABOLIC PNL TOTAL CA: CPT

## 2021-10-15 PROCEDURE — 85018 HEMOGLOBIN: CPT

## 2021-10-15 PROCEDURE — 72157 MRI CHEST SPINE W/O & W/DYE: CPT

## 2021-10-15 RX ORDER — SENNA PLUS 8.6 MG/1
2 TABLET ORAL
Qty: 60 | Refills: 0
Start: 2021-10-15 | End: 2021-11-13

## 2021-10-15 RX ADMIN — Medication 58 MILLIGRAM(S): at 13:25

## 2021-10-15 RX ADMIN — PANTOPRAZOLE SODIUM 40 MILLIGRAM(S): 20 TABLET, DELAYED RELEASE ORAL at 04:54

## 2021-10-15 RX ADMIN — Medication 25 MICROGRAM(S): at 04:54

## 2021-10-15 RX ADMIN — Medication 1 TABLET(S): at 11:36

## 2021-10-15 RX ADMIN — Medication 4000 UNIT(S): at 11:36

## 2021-10-15 NOTE — DISCHARGE NOTE NURSING/CASE MANAGEMENT/SOCIAL WORK - NSDCVIVACCINE_GEN_ALL_CORE_FT
Meningococcal MCV4O; 15-Sep-2021 13:21; José Miguel Jaramillo ([facility]IC[facility]); [employer]; [id number] (Exp. Date: 15-See-2022); IntraMuscular; Deltoid Left.; 0.5 milliLiter(s); VIS (VIS Published: 15-Sep-2021, VIS Presented: 15-Sep-2021);

## 2021-10-15 NOTE — PROGRESS NOTE ADULT - SUBJECTIVE AND OBJECTIVE BOX
SUBJECTIVE: No acute events overnight. Pt slept well. She reports that her back paresthesias have improved slightly but other areas of paresthesias have worsened.     INTERVAL HISTORY:     PAST MEDICAL & SURGICAL HISTORY:  Hypothyroid    Anxiety    Myasthenia gravis    Neuromyelitis optica    MG status post thymectomy (myasthenia gravis)  2005    History of appendectomy  2001      FAMILY HISTORY:    SOCIAL HISTORY:   T/E/D:   Occupation:   Lives with:     MEDICATIONS (HOME):  Home Medications:  Vitamin B Complex with C oral tablet: 1 tab(s) orally once a day (12 Oct 2021 01:24)    MEDICATIONS  (STANDING):  cholecalciferol 4000 Unit(s) Oral daily  dextrose 40% Gel 15 Gram(s) Oral once  dextrose 5%. 1000 milliLiter(s) (50 mL/Hr) IV Continuous <Continuous>  dextrose 5%. 1000 milliLiter(s) (100 mL/Hr) IV Continuous <Continuous>  dextrose 50% Injectable 25 Gram(s) IV Push once  dextrose 50% Injectable 12.5 Gram(s) IV Push once  dextrose 50% Injectable 25 Gram(s) IV Push once  enoxaparin Injectable 40 milliGRAM(s) SubCutaneous daily  glucagon  Injectable 1 milliGRAM(s) IntraMuscular once  insulin lispro (ADMELOG) corrective regimen sliding scale   SubCutaneous three times a day before meals  insulin lispro (ADMELOG) corrective regimen sliding scale   SubCutaneous at bedtime  levothyroxine 25 MICROGram(s) Oral daily  pantoprazole    Tablet 40 milliGRAM(s) Oral before breakfast  senna 2 Tablet(s) Oral daily  vitamin B complex with vitamin C 1 Tablet(s) Oral daily    MEDICATIONS  (PRN):    ALLERGIES/INTOLERANCES:  Allergies  No Known Allergies    Intolerances    VITALS & EXAMINATION:  Vital Signs Last 24 Hrs  T(C): 36.6 (15 Oct 2021 04:43), Max: 37.5 (14 Oct 2021 16:10)  T(F): 97.8 (15 Oct 2021 04:43), Max: 99.5 (14 Oct 2021 16:10)  HR: 78 (15 Oct 2021 04:43) (73 - 98)  BP: 124/81 (15 Oct 2021 04:43) (96/60 - 129/90)  BP(mean): --  RR: 18 (15 Oct 2021 04:43) (18 - 18)  SpO2: 95% (15 Oct 2021 04:43) (95% - 97%)    Constitutional: awake and alert.  HEENT: PERRLA, EOMI  Neck: Supple.  Respiratory: No increased work of breathing  Gastrointestinal: soft, nontender  Skin: No rashes    Neurological exam:  Mental status: AO x 3. Appropriately interactive, normal affect. Speech fluent, No Aphasia or paraphasic errors. Naming /repetition intact   CN: PERRL, EOMI  Motor: No pronator drift, normal bulk and tone, no tremor, rigidity or bradykinesia.    Sens: Intact to light touch  Coord:  No FTN dysmetria  Gait/Balance: deferred      LABORATORY:      STUDIES & IMAGING:  Studies (EKG, EEG, EMG, etc):     Radiology (XR, CT, MR, U/S, TTE/KEIRA):

## 2021-10-15 NOTE — DISCHARGE NOTE NURSING/CASE MANAGEMENT/SOCIAL WORK - PATIENT PORTAL LINK FT
You can access the FollowMyHealth Patient Portal offered by [facility] by registering at the following website: [email or web address]. By joining [employer]’s FollowMyHealth portal, you will also be able to view your health information using other applications (apps) compatible with our system.

## 2021-10-15 NOTE — PROGRESS NOTE ADULT - ASSESSMENT
41 y.o. 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 Resendiz at [address] [place] [address] and Dr. Gale at [place]), and hypothyroidism presented to the ED due to two weeks of L back stabbing pain. Not adherent to prednisone 60 mg qd that was recommended by Dr. Resendiz around the time of flare 2 weeks ago. Neurologic exam showed b/l numbness to light touch from iliac crest to anterior tibialis to toes, no tenderness to palpation in paraspinal regions b/l, and 3 patellar reflex. MR thoracic spine 9/2021 with small linear focus of chronic signal abnormality within posterior cord from C4-T1, unchanged, no enhancement. Abnormal signal/edema seen from T3-T7 associated with cord expansion and new enhancement.    Impression: L thoracic level stabbing pain of unclear etiology. Localization could be cervical or thoracic spine (particularly dermatome T3-T10 from description of level). Etiology likely be pseudo-exacerbation in the setting of recent vaccination    [x] MR cervical and thoracic spine w/wo contrast - significant decrease in size and extent of thoracic and cervical cord lesions, no active enhancement  []  1 g IV solumedrol for 5 days (started 10/11- 10/15)  [] C/w home medications, except gabapentin (attributes headache since starting; will discuss if need to taper)  [] GI ppx while on steroids  [] Neurocheck and vital q4  [] POCT while on steroids and ISS  [] outpt f/u with Dr. Resendiz

## 2021-10-20 ENCOUNTER — APPOINTMENT (OUTPATIENT)
Dept: OBGYN | Facility: CLINIC | Age: 41
End: 2021-10-20

## 2021-10-21 ENCOUNTER — APPOINTMENT (OUTPATIENT)
Dept: OBGYN | Facility: CLINIC | Age: 41
End: 2021-10-21
Payer: COMMERCIAL

## 2021-10-21 ENCOUNTER — OUTPATIENT (OUTPATIENT)
Dept: OUTPATIENT SERVICES | Facility: HOSPITAL | Age: 41
LOS: 1 days | End: 2021-10-21
Payer: COMMERCIAL

## 2021-10-21 ENCOUNTER — MED ADMIN CHARGE (OUTPATIENT)
Age: 41
End: 2021-10-21

## 2021-10-21 DIAGNOSIS — Z90.49 ACQUIRED ABSENCE OF OTHER SPECIFIED PARTS OF DIGESTIVE TRACT: Chronic | ICD-10-CM

## 2021-10-21 DIAGNOSIS — N76.0 ACUTE VAGINITIS: ICD-10-CM

## 2021-10-21 DIAGNOSIS — G70.00 MYASTHENIA GRAVIS WITHOUT (ACUTE) EXACERBATION: Chronic | ICD-10-CM

## 2021-10-21 PROCEDURE — 90471 IMMUNIZATION ADMIN: CPT

## 2021-10-21 PROCEDURE — 90649 4VHPV VACCINE 3 DOSE IM: CPT

## 2021-10-21 PROCEDURE — 90471 IMMUNIZATION ADMIN: CPT | Mod: NC

## 2021-10-21 PROCEDURE — 81025 URINE PREGNANCY TEST: CPT

## 2021-10-28 DIAGNOSIS — Z23 ENCOUNTER FOR IMMUNIZATION: ICD-10-CM

## 2021-11-03 ENCOUNTER — APPOINTMENT (OUTPATIENT)
Dept: OBGYN | Facility: CLINIC | Age: 41
End: 2021-11-03

## 2021-11-15 ENCOUNTER — NON-APPOINTMENT (OUTPATIENT)
Age: 41
End: 2021-11-15

## 2021-11-15 ENCOUNTER — MED ADMIN CHARGE (OUTPATIENT)
Age: 41
End: 2021-11-15

## 2021-11-15 ENCOUNTER — APPOINTMENT (OUTPATIENT)
Dept: INFECTIOUS DISEASE | Facility: CLINIC | Age: 41
End: 2021-11-15

## 2021-12-07 ENCOUNTER — RX RENEWAL (OUTPATIENT)
Age: 41
End: 2021-12-07

## 2021-12-07 DIAGNOSIS — E03.9 HYPOTHYROIDISM, UNSPECIFIED: ICD-10-CM

## 2021-12-07 DIAGNOSIS — K59.00 CONSTIPATION, UNSPECIFIED: ICD-10-CM

## 2021-12-07 RX ORDER — LEVOTHYROXINE SODIUM 0.03 MG/1
25 TABLET ORAL
Qty: 90 | Refills: 0 | Status: ACTIVE | COMMUNITY
Start: 2021-12-07 | End: 1900-01-01

## 2021-12-07 RX ORDER — IBUPROFEN 200 MG
TABLET ORAL
Qty: 30 | Refills: 11 | Status: ACTIVE | COMMUNITY
Start: 2021-12-07 | End: 1900-01-01

## 2021-12-09 ENCOUNTER — APPOINTMENT (OUTPATIENT)
Dept: NEUROLOGY | Facility: CLINIC | Age: 41
End: 2021-12-09
Payer: COMMERCIAL

## 2021-12-09 PROCEDURE — 99214 OFFICE O/P EST MOD 30 MIN: CPT | Mod: 95

## 2021-12-09 NOTE — HISTORY OF PRESENT ILLNESS
[Home] : at home, [unfilled] , at the time of the visit. [Medical Office: ([facility])___] : at the medical office located in  [FreeTextEntry1] : Initial hx 10/2020\par hx of myasthenia gravis - dx'd in 2005 with drooping of the L eye, weakness in both arms and legs, trouble swallowing. started imuran, prednisone, and mestinon X8mon, then thymectomy and has no longer needed meds. occ SOB residual.\par 9/2020 - developed a HA. several days later, developed abnormal sensations on the torso, burning pain, then RLE and LLE became numb, then torso became numb. Went to ED, sent home with pain medication. Then saw Dr. Pompa, ordered spine and brain MRI but wasn't able to get it before she had worsened burning sensation. Went to ED, had MRIs which showed myelitis. Got IVMP, then short prednisone taper. Burning subsided. \par Since then, has had loss of sensation from the torso down. \par Some "disturbing" electrical sensation provoked by movement. \par Stress worsens sx.\par Started PT yesterday. \par Started uplizna 12/2020\par 1/13/21 - COVID sx started friday. dx'd yesterday after covid pcr positive. generalized weakness, pressure in nose. no fevers, +cough, no SOB. + disturbance of smell and taste but may be due to congestion. will likely get antibody cocktail tomorrow. \par tapered off prednisone in mid 3/2021.\par 3/2021 - For past 3wks, when looking right and left, sees "smoke" in her vision. Some eye pain. Involving both eyes. Some conjunctival injection. Appetite has been poor. Diarrhea last week.\par 4/2021 - Over past 2d, sharp pain in the mid back behind ribs, burning on L torso. Today, mid-back is gone but burning of L torso persists. this is intermittent lasting seconds at a time. Some increased numbness in the legs., no BB dysfunction. Went to ED, had MRI T spine showing new eko enhancement consistent with relapse. Got IVMP with substantial improvement in burning. CD19 (not CD20, but drug likely out of her system by that point) was 18 cells (1%).\par Got an earlier dose of uplizna in 4/2021 due to relapse and B cells at 1%.\par Had improved prior to 4/2021 but then had a new relapse. Reports static numbness since 4/2021 but burning pain is improved.\par 7/2021 - more burning sensation of left torso and pain. went to [facility], got IVMP and felt better but scans did not demonstrate new activity.\par 8/2021 - more sx, burning, bothersome. she does not think this is a relapse. advised gabapentin 300bid and medrol dose pack for sx relief.\par 9/2021 - worsening sx, went to ED at [facility], MRIs demonstrated new activity. got IVMP, then long prednisone taper. \par Decided on switch to soliris.\par 10/2021 - developed same symptoms like last relapse - stabbing pain, headaches on saturday but then went away, numbness persists. no new weakness. I advised she increase on prednisone taper while arranging soliris switch. \par Sx worsened and she continued decreasing prednisone. Presented to [facility] in mid 10/2021 and MRIs showed significant decrease in size and extent of thoracic and cervical cord lesions, no active enhancement.\par 11/2021 - started soliris\par \par Subj interval:\par \par Started on soliris 4wks ago. Has had some headaches and congestion that she attributes to the medication.\par \par on L torso, intermittent.\par \par Intermittent focal paresthesia in the L torso, "more disturbing than pain", buzzing, goes down to the leg, lasting 1-2 minutes, happens more around menstrual cycle. \par \par Walked 15min at [employer] yesterday but then felt fatigued. Can walk around the house well though.\par \par ROS/Current Sx:\par - loss of sensation from the torso down. 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 \par \par O:\par \par AO3. Normally conversant. Follows commands. Good attention.\par \par 10/2020\par ESR\par b12 wnl\par TSh high but T4 wnl\par vitD 16\par HATTIE 1:320\par AQP4+\par ssa/ssb neg\par HTLV neg\par JCV+\par \par 9/2020\par copper wnl\par ACE wnl\par dsdna neg\par RF wnl\par HATTIE 1:640\par \par CSF 9/2020\par glucose nl\par prot 44\par absent OCBs\par igg index/synth wnl\par WNV neg\par WBC 26\par VDRL neg\par lyme neg\par AQP4+\par \par MRI brain 9/2020 - unrevealing\par \par MRI C+T 9/2020 - longitudinally extensive myelitis\par \par \par AP: NMO (LETM in 9/2020, apq4 + in both serum and CSF), hx of MG as well, +HATTIE. Started uplizna in 12/2020. New relapse on uplizna in 4/2021 but with Bcells at 1% (18) suggesting either lack of depletion or early re-population.\par \par Answered all questions, education provided re: dx, prognosis, management. management discussed at length.\par \par - cont soliris, getting at [facility] in [facility]\par - vitD 4ku daily for vitD def\par - pt agreed to be contacted re: NENMOC*\par - RTC 3m

## 2022-01-31 ENCOUNTER — APPOINTMENT (OUTPATIENT)
Dept: NEUROLOGY | Facility: CLINIC | Age: 42
End: 2022-01-31
Payer: COMMERCIAL

## 2022-01-31 VITALS
HEART RATE: 101 BPM | TEMPERATURE: 98.1 F | OXYGEN SATURATION: 98 % | BODY MASS INDEX: 30.18 KG/M2 | DIASTOLIC BLOOD PRESSURE: 90 MMHG | WEIGHT: 164 LBS | SYSTOLIC BLOOD PRESSURE: 131 MMHG | HEIGHT: 62 IN

## 2022-01-31 PROCEDURE — 99215 OFFICE O/P EST HI 40 MIN: CPT

## 2022-01-31 NOTE — HISTORY OF PRESENT ILLNESS
[FreeTextEntry1] : Initial hx 10/2020\par hx of myasthenia gravis - dx'd in 2005 with drooping of the L eye, weakness in both arms and legs, trouble swallowing. started imuran, prednisone, and mestinon X8mon, then thymectomy and has no longer needed meds. occ SOB residual.\par 9/2020 - developed a HA. several days later, developed abnormal sensations on the torso, burning pain, then RLE and LLE became numb, then torso became numb. Went to ED, sent home with pain medication. Then saw Dr. Pompa, ordered spine and brain MRI but wasn't able to get it before she had worsened burning sensation. Went to ED, had MRIs which showed myelitis. Got IVMP, then short prednisone taper. Burning subsided. \par Since then, has had loss of sensation from the torso down. \par Some "disturbing" electrical sensation provoked by movement. \par Stress worsens sx.\par Started PT yesterday. \par Started uplizna 12/2020\par 1/13/21 - COVID sx started friday. dx'd yesterday after covid pcr positive. generalized weakness, pressure in nose. no fevers, +cough, no SOB. + disturbance of smell and taste but may be due to congestion. will likely get antibody cocktail tomorrow. \par tapered off prednisone in mid 3/2021.\par 3/2021 - For past 3wks, when looking right and left, sees "smoke" in her vision. Some eye pain. Involving both eyes. Some conjunctival injection. Appetite has been poor. Diarrhea last week.\par 4/2021 - Over past 2d, sharp pain in the mid back behind ribs, burning on L torso. Today, mid-back is gone but burning of L torso persists. this is intermittent lasting seconds at a time. Some increased numbness in the legs., no BB dysfunction. Went to ED, had MRI T spine showing new keo enhancement consistent with relapse. Got IVMP with substantial improvement in burning. CD19 (not CD20, but drug likely out of her system by that point) was 18 cells (1%).\par Got an earlier dose of uplizna in 4/2021 due to relapse and B cells at 1%.\par Had improved prior to 4/2021 but then had a new relapse. Reports static numbness since 4/2021 but burning pain is improved.\par 7/2021 - more burning sensation of left torso and pain. went to [facility], got IVMP and felt better but scans did not demonstrate new activity.\par 8/2021 - more sx, burning, bothersome. she does not think this is a relapse. advised gabapentin 300bid and medrol dose pack for sx relief.\par 9/2021 - worsening sx, went to ED at [facility], MRIs demonstrated new activity. got IVMP, then long prednisone taper. \par Decided on switch to soliris.\par 10/2021 - developed same symptoms like last relapse - stabbing pain, headaches on saturday but then went away, numbness persists. no new weakness. I advised she increase on prednisone taper while arranging soliris switch. Sx worsened and she continued decreasing prednisone. Presented to [facility] in mid 10/2021 and MRIs showed significant decrease in size and extent of thoracic and cervical cord lesions, no active enhancement.\par 11/2021 - started soliris\par \par \par Subj interval:\par \par Started on soliris 11/2021. Has had some headaches and congestion that she attributes to the medication.\par \par Intermittent focal paresthesia in the L torso - since 12/2021, having worse paresthesias - buzzing sensation, goes slowly down the LLE over 2-3 min, chronic but many times during the day instead of infrequently, this is associated with left abdominal spasm and forced plantarflexion. BL leg numbness is chronic. These episodes interfere with walking.\par \par Son has taken time from college to help pt. \par \par ROS/Current Sx:\par episodes of L torso pain/paresthesias - tried gabapentin and didn't help. Lyrica helped in the past (up to 50tid), occ with dizziness.\par - loss of sensation from the torso down. 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 \par \par O:\par \par episodes of L abd and LLE spasms, lasting seconds, pt appears in severe discomfort during episodes.\par \par AO3. Normally conversant. Follows commands, names, and repeats. Good attention.\par \par PERRL, normal disc margins, VFF, EOMI, no nystagmus, face symmetric, TUP at midline.\par \par Motor: \par  R: L:\par Del 5 5\par Bi 5 5\par Tri 5 5\par Wrist Extensors 5 5\par Finger abductors 5 5\par  5 5 \par \par HF 5 5\par KE 5 5\par KF 5 5\par DF 5 5\par PF 5 5\par \par Tone R L\par UE 0 0 \par LE 0 0\par \par Sensory RUE LUE RLE LLE \par LT + + dec dec\par Vib + + mod sev\par JPS + + + dec\par PP + + dec dec\par Temp + + dec dec\par \par Reflexes:\par  R L \par Biceps 1 1\par BR 1 1\par Triceps \par Pat 1 1\par AJ 1 1\par \par TOES F F\par \par Coordination:\par  R L \par FTN 0 0 \par PATITO 0 0\par HTS 0 0 \par \par Other \par  \par Gait: narrow based, can tandem, heel, and toe walk\par \par  Assistance: none\par \par \par 10/2020\par ESR\par b12 wnl\par TSh high but T4 wnl\par vitD 16\par HATTIE 1:320\par AQP4+\par ssa/ssb neg\par HTLV neg\par JCV+\par \par 9/2020\par copper wnl\par ACE wnl\par dsdna neg\par RF wnl\par HATTIE 1:640\par \par CSF 9/2020\par glucose nl\par prot 44\par absent OCBs\par igg index/synth wnl\par WNV neg\par WBC 26\par VDRL neg\par lyme neg\par AQP4+\par \par \par MRI brain 9/2020 - unrevealing\par \par MRI C+T 9/2020 - longitudinally extensive myelitis\par \par MRI of cord in 4/2021 and 9/2021 showed new enhancement, MRI in 10/2021 looked improved.\par \par \par AP: NMO (LETM in 9/2020, apq4 + in both serum and CSF), hx of MG as well, +HATTIE. Started uplizna in 12/2020. New relapse on uplizna in 4/2021 but with Bcells at 1% (18) suggesting either lack of depletion or early re-population. Then again new relapse in 9/2021. Switched to soliris in 11/2021.\par \par Painful episodes are likely atypical painful tonic spasms. \par \par Answered all questions, education provided re: dx, prognosis, management. management discussed at length.\par \par - cont soliris, getting at [facility] in [facility]\par - trial of trileptal 150mg bid, inc to 300mg bid if necessary and as tolerated for tonic spasms.\par - vitD 4ku daily for vitD def\par - pt had agreed to be contacted re: NENMOC*\par - RTC 2m

## 2022-02-01 ENCOUNTER — NON-APPOINTMENT (OUTPATIENT)
Age: 42
End: 2022-02-01

## 2022-02-18 ENCOUNTER — APPOINTMENT (OUTPATIENT)
Dept: NEUROLOGY | Facility: CLINIC | Age: 42
End: 2022-02-18

## 2022-03-03 ENCOUNTER — APPOINTMENT (OUTPATIENT)
Dept: NEUROLOGY | Facility: CLINIC | Age: 42
End: 2022-03-03

## 2022-06-22 ENCOUNTER — APPOINTMENT (OUTPATIENT)
Dept: OTOLARYNGOLOGY | Facility: CLINIC | Age: 42
End: 2022-06-22
Payer: COMMERCIAL

## 2022-06-22 VITALS
HEIGHT: 62 IN | HEART RATE: 71 BPM | WEIGHT: 156 LBS | SYSTOLIC BLOOD PRESSURE: 97 MMHG | TEMPERATURE: 97.6 F | DIASTOLIC BLOOD PRESSURE: 64 MMHG | BODY MASS INDEX: 28.71 KG/M2

## 2022-06-22 DIAGNOSIS — H90.5 UNSPECIFIED SENSORINEURAL HEARING LOSS: ICD-10-CM

## 2022-06-22 PROCEDURE — 92557 COMPREHENSIVE HEARING TEST: CPT

## 2022-06-22 PROCEDURE — 92567 TYMPANOMETRY: CPT

## 2022-06-22 PROCEDURE — 99204 OFFICE O/P NEW MOD 45 MIN: CPT

## 2022-06-22 NOTE — PHYSICAL EXAM
[Normal] : lingual tonsils are normal [de-identified] : no saliva milked from [facility]art[facility] or [facility] b/l  [de-identified] : dry

## 2022-06-22 NOTE — END OF VISIT
[FreeTextEntry3] : I personally saw and examined BALDO CAMILO in detail.  I spoke to BETTY Baer regarding the assessment and plan of care. I performed the procedures and relevant physical exam.  I have reviewed the above assessment and plan of care and I agree.  I have made changes to the body of the note wherever necessary and appropriate.\par

## 2022-06-22 NOTE — DATA REVIEWED
[de-identified] : 06/22/22: RT: mild SNHL notch at 4kHz and LT: moderate SNHL notch at 4kHz. normal type A tymp bilaterally

## 2022-06-22 NOTE — ASSESSMENT
[FreeTextEntry1] : Ms. 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 \par no h/o swelling of salivary glands, stones seem unlikely - given autoimmune history would r/o Sjogrens \par \par - caphsol rx sent, requests biotene as well \par - would rec being seen by Rheumatology to r/o autoimmune\par - will refer to Dr. Lewis as well for evaluation in case there is any gland obstruction, strictures etc \par - Audio shows dip at 4K Hz, no known h/o noise exposure.  Recommend annual f/up

## 2022-06-22 NOTE — HISTORY OF PRESENT ILLNESS
[de-identified] : Ms. CAMILO is a 41 year female c/o dry mouth x 3 months, tongue pain which lasted a month and is now resolved, pt has been using lozenges and dry mouth gel lately and dry mouth has persisted. c/o left sided hearing loss 9/2021 after Neuro Myelitis Optica inflammation. \par denies dry eye\par h/o thymectomy 2/2 Myasthenia Gravis now in remission\par

## 2022-07-06 ENCOUNTER — APPOINTMENT (OUTPATIENT)
Dept: RHEUMATOLOGY | Facility: CLINIC | Age: 42
End: 2022-07-06

## 2022-07-06 VITALS
DIASTOLIC BLOOD PRESSURE: 64 MMHG | TEMPERATURE: 97 F | WEIGHT: 155 LBS | OXYGEN SATURATION: 98 % | BODY MASS INDEX: 28.52 KG/M2 | RESPIRATION RATE: 16 BRPM | HEIGHT: 62 IN | HEART RATE: 68 BPM | SYSTOLIC BLOOD PRESSURE: 96 MMHG

## 2022-07-06 DIAGNOSIS — Z87.898 PERSONAL HISTORY OF OTHER SPECIFIED CONDITIONS: ICD-10-CM

## 2022-07-06 PROCEDURE — 99205 OFFICE O/P NEW HI 60 MIN: CPT

## 2022-07-08 LAB
ANA PAT FLD IF-IMP: ABNORMAL
ANA SER IF-ACNC: ABNORMAL
DSDNA AB SER-ACNC: 23 IU/ML
ENA SS-A AB SER IA-ACNC: <0.2 AL
ENA SS-B AB SER IA-ACNC: <0.2 AL

## 2022-07-10 ENCOUNTER — NON-APPOINTMENT (OUTPATIENT)
Age: 42
End: 2022-07-10

## 2022-07-21 ENCOUNTER — APPOINTMENT (OUTPATIENT)
Dept: ULTRASOUND IMAGING | Facility: CLINIC | Age: 42
End: 2022-07-21

## 2022-07-21 PROCEDURE — 76536 US EXAM OF HEAD AND NECK: CPT

## 2022-07-31 NOTE — ED ADULT NURSE REASSESSMENT NOTE - CONDITION
unchanged PAST SURGICAL HISTORY:  H/O intestinal obstruction     H/O:  x4    H/O: hysterectomy     History of cataract surgery B/l eyes    History of excision of pilonidal cyst     S/P total knee replacement, right 2022

## 2022-08-01 ENCOUNTER — NON-APPOINTMENT (OUTPATIENT)
Age: 42
End: 2022-08-01

## 2022-08-08 ENCOUNTER — APPOINTMENT (OUTPATIENT)
Dept: OTOLARYNGOLOGY | Facility: CLINIC | Age: 42
End: 2022-08-08

## 2022-08-08 VITALS
WEIGHT: 156 LBS | SYSTOLIC BLOOD PRESSURE: 106 MMHG | HEART RATE: 77 BPM | TEMPERATURE: 97.6 F | HEIGHT: 62 IN | DIASTOLIC BLOOD PRESSURE: 64 MMHG | BODY MASS INDEX: 28.71 KG/M2

## 2022-08-08 DIAGNOSIS — K11.7 DISTURBANCES OF SALIVARY SECRETION: ICD-10-CM

## 2022-08-08 DIAGNOSIS — G36.0 NEUROMYELITIS OPTICA [DEVIC]: ICD-10-CM

## 2022-08-08 PROCEDURE — 99214 OFFICE O/P EST MOD 30 MIN: CPT

## 2022-08-08 NOTE — HISTORY OF PRESENT ILLNESS
[de-identified] : Ms. CAMILO is a 41 year female c/o dry mouth x 3 months, tongue pain which lasted a month and is now resolved, pt has been using lozenges and dry mouth gel lately and dry mouth has persisted. c/o left sided hearing loss 9/2021 after Neuro Myelitis Optica inflammation. \par denies dry eye\par h/o thymectomy 2/2 Myasthenia Gravis now in remission\par  [FreeTextEntry1] : pt states biotene has not changed symptoms\par US shows b/l parotid cysts left 7mm and right 6 mm\par pt saw Rheum  and serology was neg for SjogrensRylie rec biopsy

## 2022-08-08 NOTE — CONSULT LETTER
[FreeTextEntry1] : Dear Dr. Youngblood,\par I had the pleasure of evaluating your patient BALDO CAMILO, thank you for allowing us to participate in their care. please see full note detailing our visit below.\par If you have any questions, please do not hesitate to call me and I would be happy to discuss further. \par \par Herve Lewis M.D.\par Attending Physician,  \par Department of Otolaryngology - Head and Neck Surgery\par [facility] \par Office: [phone]\par Fax: [phone]\par \par

## 2022-08-08 NOTE — ASSESSMENT
[FreeTextEntry1] : Ms. 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 no h/o swelling of salivary glands, stones seem unlikely - given autoimmune history would r/o Sjogrens \par \par - US shows b/l parotid cysts left 7mm and right 6 mm\par - pt saw Rheum  and serology was neg for Rylie Zhang rec biopsy salivary gland\par - no obstructive sx to suggest duct obstruction\par - pt declines biopsy at this time, feels would not change treatment \par - Rx for dry mouth - extensive discussion into possible etiologies, and treatments. lit given\par  \par \par

## 2022-08-15 ENCOUNTER — NON-APPOINTMENT (OUTPATIENT)
Age: 42
End: 2022-08-15

## 2022-08-15 ENCOUNTER — APPOINTMENT (OUTPATIENT)
Dept: OBGYN | Facility: CLINIC | Age: 42
End: 2022-08-15

## 2022-08-16 ENCOUNTER — APPOINTMENT (OUTPATIENT)
Dept: OBGYN | Facility: CLINIC | Age: 42
End: 2022-08-16

## 2022-08-16 NOTE — HISTORY OF PRESENT ILLNESS
[FreeTextEntry1] : Patient is a 41 year old, G__P__, presenting on 08/16/2022 for c/o urinary discomfort.\par \par As per patient,\par \par \par LMP:\par Sexual Hx:\par STI concerns: \par \par \par

## 2022-08-17 ENCOUNTER — MED ADMIN CHARGE (OUTPATIENT)
Age: 42
End: 2022-08-17

## 2022-08-17 ENCOUNTER — RESULT REVIEW (OUTPATIENT)
Age: 42
End: 2022-08-17

## 2022-08-17 ENCOUNTER — LABORATORY RESULT (OUTPATIENT)
Age: 42
End: 2022-08-17

## 2022-08-17 ENCOUNTER — APPOINTMENT (OUTPATIENT)
Dept: OBGYN | Facility: CLINIC | Age: 42
End: 2022-08-17

## 2022-08-17 VITALS
BODY MASS INDEX: 28.94 KG/M2 | WEIGHT: 157.25 LBS | HEIGHT: 62 IN | DIASTOLIC BLOOD PRESSURE: 70 MMHG | SYSTOLIC BLOOD PRESSURE: 100 MMHG

## 2022-08-17 DIAGNOSIS — B97.7 PAPILLOMAVIRUS AS THE CAUSE OF DISEASES CLASSIFIED ELSEWHERE: ICD-10-CM

## 2022-08-17 PROCEDURE — 90471 IMMUNIZATION ADMIN: CPT | Mod: NC

## 2022-08-17 PROCEDURE — 57454 BX/CURETT OF CERVIX W/SCOPE: CPT | Mod: NC

## 2022-08-17 PROCEDURE — 99213 OFFICE O/P EST LOW 20 MIN: CPT | Mod: GC,25

## 2022-08-17 RX ORDER — INEBILIZUMAB 10 MG/ML
100 INJECTION INTRAVENOUS AS DIRECTED
Qty: 3 | Refills: 2 | Status: COMPLETED | COMMUNITY
Start: 2020-11-10 | End: 2022-08-17

## 2022-08-17 RX ORDER — PREDNISONE 2.5 MG/1
2.5 TABLET ORAL
Qty: 42 | Refills: 0 | Status: COMPLETED | COMMUNITY
Start: 2021-01-25 | End: 2022-08-17

## 2022-08-17 RX ORDER — SALIVA SUBSTITUTE COMBO NO.2
SOLUTION, ORAL MUCOUS MEMBRANE
Qty: 1 | Refills: 3 | Status: COMPLETED | COMMUNITY
Start: 2022-06-22 | End: 2022-08-17

## 2022-08-17 RX ORDER — SENNOSIDES 8.6 MG TABLETS 8.6 MG/1
8.6 TABLET ORAL
Qty: 60 | Refills: 11 | Status: COMPLETED | COMMUNITY
Start: 2021-12-07 | End: 2022-08-17

## 2022-08-17 RX ORDER — TERCONAZOLE 4 MG/G
0.4 CREAM VAGINAL
Qty: 1 | Refills: 1 | Status: COMPLETED | COMMUNITY
Start: 2021-01-07 | End: 2022-08-17

## 2022-08-17 RX ORDER — OXCARBAZEPINE 300 MG/1
300 TABLET, FILM COATED ORAL
Qty: 60 | Refills: 3 | Status: COMPLETED | COMMUNITY
Start: 2022-02-02 | End: 2022-08-17

## 2022-08-17 RX ORDER — FLUCONAZOLE 150 MG/1
150 TABLET ORAL
Qty: 2 | Refills: 0 | Status: COMPLETED | COMMUNITY
Start: 2020-12-09 | End: 2022-08-17

## 2022-08-17 RX ORDER — LIDOCAINE 5% 5 G/100G
5 CREAM TOPICAL
Qty: 1 | Refills: 5 | Status: COMPLETED | COMMUNITY
Start: 2021-09-03 | End: 2022-08-17

## 2022-08-17 RX ORDER — LIDOCAINE 4% 4 G/100G
4 CREAM TOPICAL
Qty: 1 | Refills: 5 | Status: COMPLETED | COMMUNITY
Start: 2021-04-05 | End: 2022-08-17

## 2022-08-17 RX ORDER — PREGABALIN 50 MG/1
50 CAPSULE ORAL
Qty: 90 | Refills: 1 | Status: COMPLETED | COMMUNITY
Start: 2021-09-15 | End: 2022-08-17

## 2022-08-17 RX ORDER — PREDNISONE 10 MG/1
10 TABLET ORAL
Qty: 120 | Refills: 0 | Status: COMPLETED | COMMUNITY
Start: 2021-10-05 | End: 2022-08-17

## 2022-08-17 RX ORDER — OXCARBAZEPINE 150 MG/1
150 TABLET, FILM COATED ORAL
Qty: 20 | Refills: 0 | Status: COMPLETED | COMMUNITY
Start: 2022-01-31 | End: 2022-08-17

## 2022-08-17 RX ORDER — CEVIMELINE HYDROCHLORIDE 30 MG/1
30 CAPSULE ORAL
Qty: 180 | Refills: 0 | Status: COMPLETED | COMMUNITY
Start: 2022-08-08 | End: 2022-08-17

## 2022-08-17 RX ORDER — ECULIZUMAB 300 MG/30ML
INJECTION, SOLUTION, CONCENTRATE INTRAVENOUS
Refills: 0 | Status: ACTIVE | COMMUNITY

## 2022-08-17 RX ORDER — NITROFURANTOIN (MONOHYDRATE/MACROCRYSTALS) 25; 75 MG/1; MG/1
100 CAPSULE ORAL
Qty: 14 | Refills: 0 | Status: COMPLETED | COMMUNITY
Start: 2021-01-07 | End: 2022-08-17

## 2022-08-17 RX ORDER — PREDNISONE 10 MG/1
10 TABLET ORAL DAILY
Qty: 60 | Refills: 3 | Status: COMPLETED | COMMUNITY
Start: 2021-09-09 | End: 2022-08-17

## 2022-08-17 RX ORDER — SALIVA STIMULANT COMB. NO.3
SPRAY, NON-AEROSOL (ML) MUCOUS MEMBRANE
Qty: 2 | Refills: 3 | Status: COMPLETED | COMMUNITY
Start: 2022-06-22 | End: 2022-08-17

## 2022-08-17 RX ORDER — CEVIMELINE HYDROCHLORIDE 30 MG/1
30 CAPSULE ORAL
Qty: 90 | Refills: 0 | Status: COMPLETED | COMMUNITY
Start: 2022-08-08 | End: 2022-08-17

## 2022-08-17 RX ORDER — PREDNISONE 10 MG/1
10 TABLET ORAL
Qty: 80 | Refills: 0 | Status: COMPLETED | COMMUNITY
Start: 2020-10-23 | End: 2022-08-17

## 2022-08-17 RX ORDER — GABAPENTIN 300 MG/1
300 CAPSULE ORAL 3 TIMES DAILY
Qty: 90 | Refills: 3 | Status: COMPLETED | COMMUNITY
Start: 2020-10-23 | End: 2022-08-17

## 2022-08-17 RX ORDER — SULFAMETHOXAZOLE AND TRIMETHOPRIM 400; 80 MG/1; MG/1
400-80 TABLET ORAL
Qty: 12 | Refills: 0 | Status: ACTIVE | COMMUNITY
Start: 2022-08-17 | End: 1900-01-01

## 2022-08-17 RX ORDER — LIDOCAINE 5% 700 MG/1
5 PATCH TOPICAL
Qty: 30 | Refills: 3 | Status: COMPLETED | COMMUNITY
Start: 2021-09-03 | End: 2022-08-17

## 2022-08-17 RX ORDER — CIPROFLOXACIN HYDROCHLORIDE 500 MG/1
500 TABLET, FILM COATED ORAL
Qty: 6 | Refills: 0 | Status: COMPLETED | COMMUNITY
Start: 2020-12-14 | End: 2022-08-17

## 2022-08-17 RX ORDER — CEVIMELINE HYDROCHLORIDE 30 MG/1
30 CAPSULE ORAL
Qty: 90 | Refills: 3 | Status: COMPLETED | COMMUNITY
Start: 2022-07-06 | End: 2022-08-17

## 2022-08-17 RX ORDER — LIDOCAINE 5% 700 MG/1
5 PATCH TOPICAL
Qty: 30 | Refills: 3 | Status: COMPLETED | COMMUNITY
Start: 2021-04-05 | End: 2022-08-17

## 2022-08-17 RX ORDER — PREDNISONE 50 MG/1
50 TABLET ORAL
Qty: 40 | Refills: 0 | Status: COMPLETED | COMMUNITY
Start: 2021-04-08 | End: 2022-08-17

## 2022-08-17 RX ORDER — METHYLPREDNISOLONE 4 MG/1
4 TABLET ORAL
Qty: 1 | Refills: 0 | Status: COMPLETED | COMMUNITY
Start: 2021-08-27 | End: 2022-08-17

## 2022-08-17 RX ORDER — FAMOTIDINE 20 MG/1
20 TABLET, FILM COATED ORAL DAILY
Qty: 30 | Refills: 1 | Status: COMPLETED | COMMUNITY
Start: 2021-04-08 | End: 2022-08-17

## 2022-08-17 NOTE — PROCEDURE
[Colposcopy] : Colposcopy  [Time out performed] : Pre-procedure time out performed.  Patient's name, date of birth and procedure confirmed. [Consent Obtained] : Consent obtained [Risks] : risks [Benefits] : benefits [Alternatives] : alternatives [ASCUS] : ASCUS [HPV High Risk] : HPV high risk [No Premedication] : no premedication [Colposcopy Adequate] : colposcopy adequate [ECC Performed] : ECC performed [No Abnormalities] : no abnormalities [Lesion] : lesion seen [Hemostasis Obtained] : Hemostasis obtained [Tolerated Well] : the patient tolerated the procedure well [Biopsy] : biopsy not taken [de-identified] : HPV 18/45 + [de-identified] : 2 [de-identified] : Acetowhite and lugol changes noted at 7 and 11 o'clock positions [de-identified] : 7, 11 [de-identified] : 41y w/ ASC-US HPV 18/45+ on pap 10/6/21.  Acetowhite and lugol changes noted with biopsy taken x2.  Pap obtained prior to colposcopy.\par \par Pt seen w/ Dr. Albert\par Hayden Goode, PGY4

## 2022-08-17 NOTE — PROCEDURE
[Colposcopy] : Colposcopy  [Time out performed] : Pre-procedure time out performed.  Patient's name, date of birth and procedure confirmed. [Consent Obtained] : Consent obtained [Risks] : risks [Benefits] : benefits [Alternatives] : alternatives [ASCUS] : ASCUS [HPV High Risk] : HPV high risk [No Premedication] : no premedication [Colposcopy Adequate] : colposcopy adequate [ECC Performed] : ECC performed [No Abnormalities] : no abnormalities [Lesion] : lesion seen [Hemostasis Obtained] : Hemostasis obtained [Tolerated Well] : the patient tolerated the procedure well [Biopsy] : biopsy not taken [de-identified] : HPV 18/45 + [de-identified] : 2 [de-identified] : Acetowhite and lugol changes noted at 7 and 11 o'clock positions [de-identified] : 7, 11 [de-identified] : 41y w/ ASC-US HPV 18/45+ on pap 10/6/21.  Acetowhite and lugol changes noted with biopsy taken x2.  Pap obtained prior to colposcopy.\par \par Pt seen w/ Dr. Albert\par Hayden Goode, PGY4

## 2022-08-18 LAB
BILIRUB UR QL STRIP: NORMAL
GLUCOSE UR-MCNC: 1000
HCG UR QL: 0.2 EU/DL
HGB UR QL STRIP.AUTO: NORMAL
KETONES UR-MCNC: NORMAL
LEUKOCYTE ESTERASE UR QL STRIP: NORMAL
NITRITE UR QL STRIP: POSITIVE
PH UR STRIP: 5.5
PROT UR STRIP-MCNC: NORMAL
SP GR UR STRIP: 1.02

## 2022-08-22 ENCOUNTER — APPOINTMENT (OUTPATIENT)
Dept: CT IMAGING | Facility: CLINIC | Age: 42
End: 2022-08-22

## 2022-08-23 NOTE — DISCUSSION/SUMMARY
[FreeTextEntry1] : Patient is 40yo, , with LMP 2022 with a hx of MG and NMO, who presents for increased discomfort when urinating for one week. Patient also counseled about preforming colposcopy and receiving second Gardasil vaccine. \par -UA positive for Nitrites and Leukocyte esterase, will treat with Bactrim for three days, UC ordered\par -Pap test and colpo done in clinic today. \par -Referral for mammo given\par -Second dose of Gardasil given\par \par Discussed with Dr. Albert\par Lambert Mcfadden PGY 1\par

## 2022-08-23 NOTE — END OF VISIT
[Resident] : Resident [FreeTextEntry2] : Hr HPV missed colpo last year\par hear for problem visit and agrees to pap and colpo today\par worried that may miss appt if not don today\par \par HPV vaccine today [] : Resident

## 2022-08-23 NOTE — HISTORY OF PRESENT ILLNESS
[FreeTextEntry1] : Hx: Patient is 40yo, , with LMP 2022 with a hx of MG and NMO, who presents for increased discomfort when urinating for one week. Patient denies any dysuria or hematuria. Patient has a history of UTIs in the past that have been treated. Patient states that she feels pelvic discomfort when urinating but denies any vaginal symptoms. Does not endorse changes in sexual partner or sexual activity. Patient also discussed wanting a Pap test for today's visit. Patient made aware of previous pap exam of ASCUS and HPV 16/18 on cotesting. Patient advised to get colposcopy done in clinic today as well as receive her second dose of Gardasil. \par \par OB:\par - PT \par - FT \par \par Gyn/Health Maintenance:\par - denies fibroids, polyps, cysts, endometriosis\par -Pap 2021 ASCUS w/ HPV 16+18\par -Mammo 2021\par -Contraception: None\par -Sexual activity with fiance, denies hx of STD\par \par \par All: NKDA\par Meds:\par -Soliris injections for NMO\par -levothyroxine \par -PO Fe\par PMH:\par - Myasthenia Gravis \par -NMO\par - benign salivary cyst\par PSH:\par - lap appy\par - Thymectomy \par social: 2-3 cigarettes per day\par Psych: endorses anxiety but never tx or diagnosed\par \par ROS: Neg for fever, chills, nv, chest pain, SOB, or abdominal pain\par \par PE:\par Breast: Normal to inspection, no masses felt BL, axillary nodes felt BL.\par Vaginal: Normal external genitalia, bimanual exam wnl\par Speculum: Normal vaginal discharge Normal vaginal and cervical mucosa.\par Abdominal: soft, nontender, no masses felt.\par Extremeties: Atraumatic, no leg edema BL.

## 2022-08-23 NOTE — DISCUSSION/SUMMARY
[FreeTextEntry1] : Patient is 42yo, , with LMP 2022 with a hx of MG and NMO, who presents for increased discomfort when urinating for one week. Patient also counseled about preforming colposcopy and receiving second Gardasil vaccine. \par -UA positive for Nitrites and Leukocyte esterase, will treat with Bactrim for three days, UC ordered\par -Pap test and colpo done in clinic today. \par -Referral for mammo given\par -Second dose of Gardasil given\par \par Discussed with Dr. Albert\par Lambert Mcfadden PGY 1\par

## 2022-08-23 NOTE — HISTORY OF PRESENT ILLNESS
[FreeTextEntry1] : Hx: Patient is 42yo, , with LMP 2022 with a hx of MG and NMO, who presents for increased discomfort when urinating for one week. Patient denies any dysuria or hematuria. Patient has a history of UTIs in the past that have been treated. Patient states that she feels pelvic discomfort when urinating but denies any vaginal symptoms. Does not endorse changes in sexual partner or sexual activity. Patient also discussed wanting a Pap test for today's visit. Patient made aware of previous pap exam of ASCUS and HPV 16/18 on cotesting. Patient advised to get colposcopy done in clinic today as well as receive her second dose of Gardasil. \par \par OB:\par - PT \par - FT \par \par Gyn/Health Maintenance:\par - denies fibroids, polyps, cysts, endometriosis\par -Pap 2021 ASCUS w/ HPV 16+18\par -Mammo 2021\par -Contraception: None\par -Sexual activity with fiance, denies hx of STD\par \par \par All: NKDA\par Meds:\par -Soliris injections for NMO\par -levothyroxine \par -PO Fe\par PMH:\par - Myasthenia Gravis \par -NMO\par - benign salivary cyst\par PSH:\par - lap appy\par - Thymectomy \par social: 2-3 cigarettes per day\par Psych: endorses anxiety but never tx or diagnosed\par \par ROS: Neg for fever, chills, nv, chest pain, SOB, or abdominal pain\par \par PE:\par Breast: Normal to inspection, no masses felt BL, axillary nodes felt BL.\par Vaginal: Normal external genitalia, bimanual exam wnl\par Speculum: Normal vaginal discharge Normal vaginal and cervical mucosa.\par Abdominal: soft, nontender, no masses felt.\par Extremeties: Atraumatic, no leg edema BL.

## 2022-08-24 ENCOUNTER — NON-APPOINTMENT (OUTPATIENT)
Age: 42
End: 2022-08-24

## 2022-08-31 NOTE — PATIENT PROFILE ADULT - PRO INTERPRETER NEED 2
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  She did consent to lumpectomy for definitive tissue diagnosis.  She was taken to the OR, placed supine, general endotracheal anesthesia was induced, the right breast was prepped and draped in the usual fashion, a time-out was completed.  A curvilinear incision was made along the lateral border of the nipple-areolar complex, medially.  A lumpectomy specimen was developed using electrocautery around the RADHA  marker.  Once all lateral lumpectomy sites had been developed, the specimen was pulled out through the incision and it was confirmed that the  marker was within the center of the specimen.  The posterior attachments were then divided using electrocautery.  The specimen was marked as short stitch superior, long stitch lateral, single stitch anterior.  I personally interpreted the specimen mammogram which showed that the RADHA  as well as the clip were within the specimen.  I returned to the OR.  Oncoplastic flaps were created by folding surrounding breast tissue into the lumpectomy cavity.  These were secured to themselves using 2-0 Vicryl sutures.  Skin was then closed using 3-0 Vicryl and a running 4-0 Monocryl.  Dermabond was applied as a dressing.  Patient tolerated the  entire procedure without apparent complication, was extubated in the OR, brought to recovery in stable condition.  All counts were correct.    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 CELESTINE SALAS    Release to patient Immediate        08/31/22 1158                            Condition: Good    Disposition: PACU - hemodynamically stable.    Attestation: I was present and scrubbed for the entire procedure.   English

## 2022-09-15 NOTE — ED ADULT NURSE NOTE - NS ED NURSE DISCH DISPOSITION
Per pt mother school stated that pt looked mottled and that his heart rate was in the 60's    Discharged

## 2022-09-16 ENCOUNTER — APPOINTMENT (OUTPATIENT)
Dept: CT IMAGING | Facility: CLINIC | Age: 42
End: 2022-09-16

## 2022-09-29 NOTE — H&P PST ADULT - NSANTHOBSERVEDRD_ENT_A_CORE
Kevin Spears  1968  Preferred Contact Number: [phone] (mobile)      Patient is returning phone call to Brittni.     Preferred times to be called:n/a    Please call back     No

## 2022-10-05 ENCOUNTER — NON-APPOINTMENT (OUTPATIENT)
Age: 42
End: 2022-10-05

## 2022-10-07 DIAGNOSIS — R76.8 OTHER SPECIFIED ABNORMAL IMMUNOLOGICAL FINDINGS IN SERUM: ICD-10-CM

## 2023-02-02 ENCOUNTER — RX RENEWAL (OUTPATIENT)
Age: 43
End: 2023-02-02

## 2023-02-06 NOTE — ED ADULT NURSE NOTE - JUGULAR VENOUS DISTENTION
PAST MEDICAL HISTORY:  DM (diabetes mellitus), type 2     Generalized anxiety disorder     HLD (hyperlipidemia)     HTN (hypertension)     Macular degeneration     AUSTYN on CPAP      absent

## 2023-03-30 ENCOUNTER — RX RENEWAL (OUTPATIENT)
Age: 43
End: 2023-03-30

## 2023-04-02 ENCOUNTER — NON-APPOINTMENT (OUTPATIENT)
Age: 43
End: 2023-04-02

## 2023-05-18 NOTE — H&P PST ADULT - BSA (M2)
1.68 Bactrim Pregnancy And Lactation Text: This medication is Pregnancy Category D and is known to cause fetal risk.  It is also excreted in breast milk.

## 2023-08-22 ENCOUNTER — APPOINTMENT (OUTPATIENT)
Dept: OBGYN | Facility: CLINIC | Age: 43
End: 2023-08-22

## 2023-09-18 ENCOUNTER — LABORATORY RESULT (OUTPATIENT)
Age: 43
End: 2023-09-18

## 2023-09-18 ENCOUNTER — APPOINTMENT (OUTPATIENT)
Dept: OBGYN | Facility: CLINIC | Age: 43
End: 2023-09-18
Payer: MEDICAID

## 2023-09-18 ENCOUNTER — OUTPATIENT (OUTPATIENT)
Dept: OUTPATIENT SERVICES | Facility: HOSPITAL | Age: 43
LOS: 1 days | End: 2023-09-18
Payer: COMMERCIAL

## 2023-09-18 VITALS — BODY MASS INDEX: 28.72 KG/M2 | SYSTOLIC BLOOD PRESSURE: 104 MMHG | WEIGHT: 157 LBS | DIASTOLIC BLOOD PRESSURE: 70 MMHG

## 2023-09-18 DIAGNOSIS — G70.00 MYASTHENIA GRAVIS WITHOUT (ACUTE) EXACERBATION: Chronic | ICD-10-CM

## 2023-09-18 DIAGNOSIS — Z01.419 ENCOUNTER FOR GYNECOLOGICAL EXAMINATION (GENERAL) (ROUTINE) W/OUT ABNORMAL FINDINGS: ICD-10-CM

## 2023-09-18 DIAGNOSIS — Z90.49 ACQUIRED ABSENCE OF OTHER SPECIFIED PARTS OF DIGESTIVE TRACT: Chronic | ICD-10-CM

## 2023-09-18 DIAGNOSIS — N76.0 ACUTE VAGINITIS: ICD-10-CM

## 2023-09-18 PROCEDURE — 87624 HPV HI-RISK TYP POOLED RSLT: CPT

## 2023-09-18 PROCEDURE — G0463: CPT

## 2023-09-18 PROCEDURE — 87625 HPV TYPES 16 & 18 ONLY: CPT

## 2023-09-18 PROCEDURE — 87491 CHLMYD TRACH DNA AMP PROBE: CPT

## 2023-09-18 PROCEDURE — 88175 CYTOPATH C/V AUTO FLUID REDO: CPT

## 2023-09-18 PROCEDURE — 99396 PREV VISIT EST AGE 40-64: CPT

## 2023-09-18 PROCEDURE — 88141 CYTOPATH C/V INTERPRET: CPT

## 2023-09-18 PROCEDURE — 87591 N.GONORRHOEAE DNA AMP PROB: CPT

## 2023-09-19 DIAGNOSIS — Z01.419 ENCOUNTER FOR GYNECOLOGICAL EXAMINATION (GENERAL) (ROUTINE) WITHOUT ABNORMAL FINDINGS: ICD-10-CM

## 2023-09-20 LAB
HPV GENOTYPE 16: SIGNIFICANT CHANGE UP
HPV GENOTYPE 18/45: SIGNIFICANT CHANGE UP

## 2023-09-22 LAB — CYTOLOGY SPEC DOC CYTO: SIGNIFICANT CHANGE UP

## 2023-12-09 ENCOUNTER — EMERGENCY (EMERGENCY)
Facility: HOSPITAL | Age: 43
LOS: 1 days | Discharge: HOME | End: 2023-12-09
Attending: EMERGENCY MEDICINE
Payer: COMMERCIAL

## 2023-12-09 VITALS
WEIGHT: 149.91 LBS | TEMPERATURE: 100 F | DIASTOLIC BLOOD PRESSURE: 105 MMHG | SYSTOLIC BLOOD PRESSURE: 145 MMHG | RESPIRATION RATE: 20 BRPM | OXYGEN SATURATION: 98 % | HEART RATE: 95 BPM | HEIGHT: 62 IN

## 2023-12-09 VITALS
OXYGEN SATURATION: 96 % | RESPIRATION RATE: 18 BRPM | SYSTOLIC BLOOD PRESSURE: 129 MMHG | HEART RATE: 85 BPM | DIASTOLIC BLOOD PRESSURE: 84 MMHG

## 2023-12-09 DIAGNOSIS — G70.00 MYASTHENIA GRAVIS WITHOUT (ACUTE) EXACERBATION: Chronic | ICD-10-CM

## 2023-12-09 DIAGNOSIS — Z90.49 ACQUIRED ABSENCE OF OTHER SPECIFIED PARTS OF DIGESTIVE TRACT: Chronic | ICD-10-CM

## 2023-12-09 LAB
ALBUMIN SERPL ELPH-MCNC: 4.5 G/DL — SIGNIFICANT CHANGE UP (ref 3.3–5)
ALBUMIN SERPL ELPH-MCNC: 4.5 G/DL — SIGNIFICANT CHANGE UP (ref 3.3–5)
ALP SERPL-CCNC: 40 U/L — SIGNIFICANT CHANGE UP (ref 40–120)
ALP SERPL-CCNC: 40 U/L — SIGNIFICANT CHANGE UP (ref 40–120)
ALT FLD-CCNC: 13 U/L — SIGNIFICANT CHANGE UP (ref 10–45)
ALT FLD-CCNC: 13 U/L — SIGNIFICANT CHANGE UP (ref 10–45)
ANION GAP SERPL CALC-SCNC: 12 MMOL/L — SIGNIFICANT CHANGE UP (ref 5–17)
ANION GAP SERPL CALC-SCNC: 12 MMOL/L — SIGNIFICANT CHANGE UP (ref 5–17)
APPEARANCE UR: ABNORMAL
APPEARANCE UR: ABNORMAL
APTT BLD: 35.9 SEC — HIGH (ref 24.5–35.6)
APTT BLD: 35.9 SEC — HIGH (ref 24.5–35.6)
AST SERPL-CCNC: 12 U/L — SIGNIFICANT CHANGE UP (ref 10–40)
AST SERPL-CCNC: 12 U/L — SIGNIFICANT CHANGE UP (ref 10–40)
BACTERIA # UR AUTO: ABNORMAL /HPF
BACTERIA # UR AUTO: ABNORMAL /HPF
BASOPHILS # BLD AUTO: 0.05 K/UL — SIGNIFICANT CHANGE UP (ref 0–0.2)
BASOPHILS # BLD AUTO: 0.05 K/UL — SIGNIFICANT CHANGE UP (ref 0–0.2)
BASOPHILS NFR BLD AUTO: 0.5 % — SIGNIFICANT CHANGE UP (ref 0–2)
BASOPHILS NFR BLD AUTO: 0.5 % — SIGNIFICANT CHANGE UP (ref 0–2)
BILIRUB SERPL-MCNC: 0.5 MG/DL — SIGNIFICANT CHANGE UP (ref 0.2–1.2)
BILIRUB SERPL-MCNC: 0.5 MG/DL — SIGNIFICANT CHANGE UP (ref 0.2–1.2)
BILIRUB UR-MCNC: NEGATIVE — SIGNIFICANT CHANGE UP
BILIRUB UR-MCNC: NEGATIVE — SIGNIFICANT CHANGE UP
BUN SERPL-MCNC: 12 MG/DL — SIGNIFICANT CHANGE UP (ref 7–23)
BUN SERPL-MCNC: 12 MG/DL — SIGNIFICANT CHANGE UP (ref 7–23)
CALCIUM SERPL-MCNC: 9.4 MG/DL — SIGNIFICANT CHANGE UP (ref 8.4–10.5)
CALCIUM SERPL-MCNC: 9.4 MG/DL — SIGNIFICANT CHANGE UP (ref 8.4–10.5)
CAST: 0 /LPF — SIGNIFICANT CHANGE UP (ref 0–4)
CAST: 0 /LPF — SIGNIFICANT CHANGE UP (ref 0–4)
CHLORIDE SERPL-SCNC: 100 MMOL/L — SIGNIFICANT CHANGE UP (ref 96–108)
CHLORIDE SERPL-SCNC: 100 MMOL/L — SIGNIFICANT CHANGE UP (ref 96–108)
CO2 SERPL-SCNC: 24 MMOL/L — SIGNIFICANT CHANGE UP (ref 22–31)
CO2 SERPL-SCNC: 24 MMOL/L — SIGNIFICANT CHANGE UP (ref 22–31)
COLOR SPEC: ABNORMAL
COLOR SPEC: ABNORMAL
CREAT SERPL-MCNC: 0.5 MG/DL — SIGNIFICANT CHANGE UP (ref 0.5–1.3)
CREAT SERPL-MCNC: 0.5 MG/DL — SIGNIFICANT CHANGE UP (ref 0.5–1.3)
DIFF PNL FLD: ABNORMAL
DIFF PNL FLD: ABNORMAL
EGFR: 119 ML/MIN/1.73M2 — SIGNIFICANT CHANGE UP
EGFR: 119 ML/MIN/1.73M2 — SIGNIFICANT CHANGE UP
EOSINOPHIL # BLD AUTO: 0.03 K/UL — SIGNIFICANT CHANGE UP (ref 0–0.5)
EOSINOPHIL # BLD AUTO: 0.03 K/UL — SIGNIFICANT CHANGE UP (ref 0–0.5)
EOSINOPHIL NFR BLD AUTO: 0.3 % — SIGNIFICANT CHANGE UP (ref 0–6)
EOSINOPHIL NFR BLD AUTO: 0.3 % — SIGNIFICANT CHANGE UP (ref 0–6)
GLUCOSE SERPL-MCNC: 97 MG/DL — SIGNIFICANT CHANGE UP (ref 70–99)
GLUCOSE SERPL-MCNC: 97 MG/DL — SIGNIFICANT CHANGE UP (ref 70–99)
GLUCOSE UR QL: >=1000 MG/DL
GLUCOSE UR QL: >=1000 MG/DL
HCG SERPL-ACNC: <2 MIU/ML — SIGNIFICANT CHANGE UP
HCG SERPL-ACNC: <2 MIU/ML — SIGNIFICANT CHANGE UP
HCT VFR BLD CALC: 41.8 % — SIGNIFICANT CHANGE UP (ref 34.5–45)
HCT VFR BLD CALC: 41.8 % — SIGNIFICANT CHANGE UP (ref 34.5–45)
HGB BLD-MCNC: 14 G/DL — SIGNIFICANT CHANGE UP (ref 11.5–15.5)
HGB BLD-MCNC: 14 G/DL — SIGNIFICANT CHANGE UP (ref 11.5–15.5)
IMM GRANULOCYTES NFR BLD AUTO: 0.2 % — SIGNIFICANT CHANGE UP (ref 0–0.9)
IMM GRANULOCYTES NFR BLD AUTO: 0.2 % — SIGNIFICANT CHANGE UP (ref 0–0.9)
INR BLD: 1.07 RATIO — SIGNIFICANT CHANGE UP (ref 0.85–1.18)
INR BLD: 1.07 RATIO — SIGNIFICANT CHANGE UP (ref 0.85–1.18)
KETONES UR-MCNC: 15 MG/DL
KETONES UR-MCNC: 15 MG/DL
LEUKOCYTE ESTERASE UR-ACNC: ABNORMAL
LEUKOCYTE ESTERASE UR-ACNC: ABNORMAL
LYMPHOCYTES # BLD AUTO: 1.38 K/UL — SIGNIFICANT CHANGE UP (ref 1–3.3)
LYMPHOCYTES # BLD AUTO: 1.38 K/UL — SIGNIFICANT CHANGE UP (ref 1–3.3)
LYMPHOCYTES # BLD AUTO: 14.8 % — SIGNIFICANT CHANGE UP (ref 13–44)
LYMPHOCYTES # BLD AUTO: 14.8 % — SIGNIFICANT CHANGE UP (ref 13–44)
MCHC RBC-ENTMCNC: 28.7 PG — SIGNIFICANT CHANGE UP (ref 27–34)
MCHC RBC-ENTMCNC: 28.7 PG — SIGNIFICANT CHANGE UP (ref 27–34)
MCHC RBC-ENTMCNC: 33.5 GM/DL — SIGNIFICANT CHANGE UP (ref 32–36)
MCHC RBC-ENTMCNC: 33.5 GM/DL — SIGNIFICANT CHANGE UP (ref 32–36)
MCV RBC AUTO: 85.8 FL — SIGNIFICANT CHANGE UP (ref 80–100)
MCV RBC AUTO: 85.8 FL — SIGNIFICANT CHANGE UP (ref 80–100)
MONOCYTES # BLD AUTO: 0.68 K/UL — SIGNIFICANT CHANGE UP (ref 0–0.9)
MONOCYTES # BLD AUTO: 0.68 K/UL — SIGNIFICANT CHANGE UP (ref 0–0.9)
MONOCYTES NFR BLD AUTO: 7.3 % — SIGNIFICANT CHANGE UP (ref 2–14)
MONOCYTES NFR BLD AUTO: 7.3 % — SIGNIFICANT CHANGE UP (ref 2–14)
NEUTROPHILS # BLD AUTO: 7.18 K/UL — SIGNIFICANT CHANGE UP (ref 1.8–7.4)
NEUTROPHILS # BLD AUTO: 7.18 K/UL — SIGNIFICANT CHANGE UP (ref 1.8–7.4)
NEUTROPHILS NFR BLD AUTO: 76.9 % — SIGNIFICANT CHANGE UP (ref 43–77)
NEUTROPHILS NFR BLD AUTO: 76.9 % — SIGNIFICANT CHANGE UP (ref 43–77)
NITRITE UR-MCNC: NEGATIVE — SIGNIFICANT CHANGE UP
NITRITE UR-MCNC: NEGATIVE — SIGNIFICANT CHANGE UP
NRBC # BLD: 0 /100 WBCS — SIGNIFICANT CHANGE UP (ref 0–0)
NRBC # BLD: 0 /100 WBCS — SIGNIFICANT CHANGE UP (ref 0–0)
PH UR: 6.5 — SIGNIFICANT CHANGE UP (ref 5–8)
PH UR: 6.5 — SIGNIFICANT CHANGE UP (ref 5–8)
PLATELET # BLD AUTO: 215 K/UL — SIGNIFICANT CHANGE UP (ref 150–400)
PLATELET # BLD AUTO: 215 K/UL — SIGNIFICANT CHANGE UP (ref 150–400)
POTASSIUM SERPL-MCNC: 4.1 MMOL/L — SIGNIFICANT CHANGE UP (ref 3.5–5.3)
POTASSIUM SERPL-MCNC: 4.1 MMOL/L — SIGNIFICANT CHANGE UP (ref 3.5–5.3)
POTASSIUM SERPL-SCNC: 4.1 MMOL/L — SIGNIFICANT CHANGE UP (ref 3.5–5.3)
POTASSIUM SERPL-SCNC: 4.1 MMOL/L — SIGNIFICANT CHANGE UP (ref 3.5–5.3)
PROT SERPL-MCNC: 8.3 G/DL — SIGNIFICANT CHANGE UP (ref 6–8.3)
PROT SERPL-MCNC: 8.3 G/DL — SIGNIFICANT CHANGE UP (ref 6–8.3)
PROT UR-MCNC: SIGNIFICANT CHANGE UP MG/DL
PROT UR-MCNC: SIGNIFICANT CHANGE UP MG/DL
PROTHROM AB SERPL-ACNC: 11.7 SEC — SIGNIFICANT CHANGE UP (ref 9.5–13)
PROTHROM AB SERPL-ACNC: 11.7 SEC — SIGNIFICANT CHANGE UP (ref 9.5–13)
RAPID RVP RESULT: SIGNIFICANT CHANGE UP
RAPID RVP RESULT: SIGNIFICANT CHANGE UP
RBC # BLD: 4.87 M/UL — SIGNIFICANT CHANGE UP (ref 3.8–5.2)
RBC # BLD: 4.87 M/UL — SIGNIFICANT CHANGE UP (ref 3.8–5.2)
RBC # FLD: 12.4 % — SIGNIFICANT CHANGE UP (ref 10.3–14.5)
RBC # FLD: 12.4 % — SIGNIFICANT CHANGE UP (ref 10.3–14.5)
RBC CASTS # UR COMP ASSIST: 1066 /HPF — HIGH (ref 0–4)
RBC CASTS # UR COMP ASSIST: 1066 /HPF — HIGH (ref 0–4)
SARS-COV-2 RNA SPEC QL NAA+PROBE: SIGNIFICANT CHANGE UP
SARS-COV-2 RNA SPEC QL NAA+PROBE: SIGNIFICANT CHANGE UP
SODIUM SERPL-SCNC: 136 MMOL/L — SIGNIFICANT CHANGE UP (ref 135–145)
SODIUM SERPL-SCNC: 136 MMOL/L — SIGNIFICANT CHANGE UP (ref 135–145)
SP GR SPEC: >1.03 — HIGH (ref 1–1.03)
SP GR SPEC: >1.03 — HIGH (ref 1–1.03)
SQUAMOUS # UR AUTO: 6 /HPF — HIGH (ref 0–5)
SQUAMOUS # UR AUTO: 6 /HPF — HIGH (ref 0–5)
UROBILINOGEN FLD QL: 1 MG/DL — SIGNIFICANT CHANGE UP (ref 0.2–1)
UROBILINOGEN FLD QL: 1 MG/DL — SIGNIFICANT CHANGE UP (ref 0.2–1)
WBC # BLD: 9.34 K/UL — SIGNIFICANT CHANGE UP (ref 3.8–10.5)
WBC # BLD: 9.34 K/UL — SIGNIFICANT CHANGE UP (ref 3.8–10.5)
WBC # FLD AUTO: 9.34 K/UL — SIGNIFICANT CHANGE UP (ref 3.8–10.5)
WBC # FLD AUTO: 9.34 K/UL — SIGNIFICANT CHANGE UP (ref 3.8–10.5)
WBC UR QL: 21 /HPF — HIGH (ref 0–5)
WBC UR QL: 21 /HPF — HIGH (ref 0–5)

## 2023-12-09 PROCEDURE — 71045 X-RAY EXAM CHEST 1 VIEW: CPT | Mod: 26

## 2023-12-09 PROCEDURE — 81001 URINALYSIS AUTO W/SCOPE: CPT

## 2023-12-09 PROCEDURE — 71045 X-RAY EXAM CHEST 1 VIEW: CPT

## 2023-12-09 PROCEDURE — 99285 EMERGENCY DEPT VISIT HI MDM: CPT

## 2023-12-09 PROCEDURE — 85025 COMPLETE CBC W/AUTO DIFF WBC: CPT

## 2023-12-09 PROCEDURE — 96374 THER/PROPH/DIAG INJ IV PUSH: CPT

## 2023-12-09 PROCEDURE — 84702 CHORIONIC GONADOTROPIN TEST: CPT

## 2023-12-09 PROCEDURE — 80053 COMPREHEN METABOLIC PANEL: CPT

## 2023-12-09 PROCEDURE — 85610 PROTHROMBIN TIME: CPT

## 2023-12-09 PROCEDURE — 85730 THROMBOPLASTIN TIME PARTIAL: CPT

## 2023-12-09 PROCEDURE — 84484 ASSAY OF TROPONIN QUANT: CPT

## 2023-12-09 PROCEDURE — 0225U NFCT DS DNA&RNA 21 SARSCOV2: CPT

## 2023-12-09 PROCEDURE — 93005 ELECTROCARDIOGRAM TRACING: CPT

## 2023-12-09 PROCEDURE — 99285 EMERGENCY DEPT VISIT HI MDM: CPT | Mod: 25

## 2023-12-09 RX ORDER — LEVOTHYROXINE SODIUM 125 MCG
1 TABLET ORAL
Qty: 7 | Refills: 0
Start: 2023-12-09 | End: 2023-12-15

## 2023-12-09 RX ORDER — LEVOTHYROXINE SODIUM 125 MCG
50 TABLET ORAL ONCE
Refills: 0 | Status: COMPLETED | OUTPATIENT
Start: 2023-12-09 | End: 2023-12-09

## 2023-12-09 RX ADMIN — Medication 50 MILLIGRAM(S): at 19:44

## 2023-12-09 RX ADMIN — Medication 50 MICROGRAM(S): at 19:43

## 2023-12-09 NOTE — ED PROVIDER NOTE - PROGRESS NOTE DETAILS
Maximilian PGY5: Discussed with patient in detail laboratory workup involving patient's pain, including x-ray patient is declining for now including other medical treatment and states that she only needs steroids for this flareup of NMO.  Expressed the importance of a thorough workup however patient is currently declining and will reassess pending once steroids are given. Maximilian PGY5: Labs/imaging non actionable. Patient w/ improvement in symptoms. VSS. Strict return precautions given.  Discussed with patient results of urine examination by current menstrual cycle patient currently not expressing any urinary symptoms states that would not like to be empirically treated for clinical urine culture and will follow-up with her PCP.  Neurology evaluated patient and stated no further intervention no need for inpatient steroids or steroids prescribed at outside.  Will send patient levothyroxine as she ran out patient states she will follow-up with her primary to fill the rest of the prescription.

## 2023-12-09 NOTE — ED PROVIDER NOTE - ATTENDING CONTRIBUTION TO CARE
Jose Cleaning MD:  I personally saw the patient and performed a substantive portion of the visit including all aspects of the medical decision making.    MDM: 43-year-old female with history as seen in HPI above, who presents with bilateral lower–lateral chest wall pain that is episodic.  Patient states that this is indicative of her neuromyelitis and is strongly requesting steroids.    ROS: denies trauma, fevers, chills, cough, chest pain, shortness of breath, abdominal pain, flank pain, nausea, vomiting, diaphoresis, dizziness, syncope, leg pain/swelling, paresthesia, numbness, or weakness, dysuria, hematuria    On examination, patient with stable vitals. Cardiac examination RRR, lungs CTAB with no wheezing/rales/rhonchi, mild tenderness over bilateral lower chest wall.  ABD: Abdomen soft, non-tender and non-distended, no rebound, no guarding, no rigidity. No CVA tenderness. neurovascularly intact in all 4 extremities.  There is no calf tenderness or leg swelling.  Negative Kavita's sign.    Will obtain labs to evaluate for hematologic disorder, metabolic derangements, hepatic and renal function.  Will obtain chest x-ray to evaluate for acute cardiopulmonary pathology.  Neurology consultation.  Will obtain EKG and troponin to evaluate for possible cardiac abnormality including ACS, though not likely.  Methylprednisolone given.    Neurology saw patient and recommended discharge for outpatient neurology follow-up without prescription for outpatient steroids, and no indication for admission for IV steroids or further workup or treatment.    The patient was reassessed and they state that their condition has improved. Stable vitals. Repeat neuro exam is benign without any neuro deficits. Repeat cardiovascular examination shows NRRR, equal pulses in all 4 extremities. Repeat pulmonary examination shows equal bilateral lung sounds. Repeat abdominal examination shows no significant tenderness, no peritoneal signs including rebound or guarding. The patient was able to eat, drink, and ambulate without assistance in the ED.     Patient is stable for discharge. Results and D/C instructions were printed and discussed with the patient. Additional verbal instructions were given. The patient agrees to return to the ED immediately for any new or concerning symptoms, or if they get worse. They show good understanding of their D/C instructions, printed results, and return precautions including alarm symptoms specific to their complaint and condition. They agree to follow-up with their PMD for repeat evaluation in the next 2-3 days and neurology within 1 day. At the time of D/C, pt remained in stable condition with stable vital signs.    Differential includes but is not limited to: See above    Patient with new problems requiring additional work-up and treatment, following orders: see above  Discussed with: Neurology consultant  Obtained and reviewed external records: Discharge note from 10/14/2021  Additional history obtained from: Significant other at bedside  Chronic conditions and social determinants of health affecting care: see above

## 2023-12-09 NOTE — ED PROVIDER NOTE - EKG ADDITIONAL INFORMATION FREE TEXT
My independent interpretation of the EKG shows:  Normal sinus rhythm with rate of 77 bpm, normal axis, T wave inversions noted in V2 and V3,, no ST elevations or depressions.  QTc 454

## 2023-12-09 NOTE — ED PROVIDER NOTE - CLINICAL SUMMARY MEDICAL DECISION MAKING FREE TEXT BOX
43 y.o. F with PMH of MG (binding/modulating Abs s/p thymectomy) in remission, NMO (previously on inebilizumab, now getting ready to be on Soliris to start on 10/19, follows with Dr. Resendiz at [address] [place] [address] and Dr. Gale at [place]), and hypothyroidism Presents with bilateral thigh pain likely secondary to animal flareup however other etiologies are considered pending patient amenability for further workup.  PE remarkable for bilateral lower lateral chest wall tenderness to palpation.  Labs steroids EKG. 43 y.o. F with PMH of MG (binding/modulating Abs s/p thymectomy) in remission, NMO (previously on inebilizumab, now getting ready to be on Soliris to start on 10/19, follows with Dr. Resendiz at [address] [place] [address] and Dr. Gale at [place]), and hypothyroidism Presents with bilateral thigh pain likely secondary to animal flareup however other etiologies are considered pending patient amenability for further workup.  PE remarkable for bilateral lower lateral chest wall tenderness to palpation.  Labs steroids EKG.

## 2023-12-09 NOTE — ED PROVIDER NOTE - PATIENT PORTAL LINK FT
You can access the FollowMyHealth Patient Portal offered by [facility] by registering at the following website: [email or web address]. By joining [employer]’s FollowMyHealth portal, you will also be able to view your health information using other applications (apps) compatible with our system. You can access the FollowMyHealth Patient Portal offered by [facility] by registering at the following website: [email or web address]. By joining [employer]’s FollowMyHealth portal, you will also be able to view your health information using other applications (apps) compatible with our system.

## 2023-12-09 NOTE — CONSULT NOTE ADULT - SUBJECTIVE AND OBJECTIVE BOX
HPI:  43 y.o. F with PMH of MG (binding/modulating AB blocking s/p thymectomy), hypothyroidism on levothyroxine 0.5 mcg, NMO (previously on inebilizumab, now getting Soliris q2 weeks, next dose next week, Dr. Gale at [place]) presented to [facility] ED on 12/9/23 due to R lateral abdomen region sharp shooting pain, similar to what she describes as her NMO flare. She had the diagnosis of NMO since 2020, seen Dr. Resendiz in the beginning part of the treatment, but failed treatment 3x, went to Dr. Gale for second opinion, which is the neurologist that she follows currently. In 2022, started on soliris and now on biweekly 1200 mg, which she will also receive next week. Pt reported that with the flare usually starts with worsening residual left sided numbness and sharp pain near the 10-12th rib region and lateral lower abdomen. Then it jumps to R side. This time, had it for 48 hours, aggrevated to squeezing type of pain and could not tolerate so came to ED. Currently s/p 1g of solumedrol and noted improved pain. Takes levothyroxine 0.5 mcg qd, ferrous sulfate 350 mg qd, and vitamin C/D complex. Denies any recent fever, chills, rhinorrea, speech difficulty, blurry vision, constipation/diarrhea. Lives at home with family, independent with ADLs for the most part. but sometimes has to use some assistance with walking.  Has hx of hospitalization back in 2021, with left sided back pain, found to have chronic lesion in T4-6 but no enhancement. Treated with 5 days of solumedrol and improved symptom.    REVIEW OF SYSTEMS  A 10-system ROS was performed and is negative except for those items noted above and/or in the HPI.    PAST MEDICAL & SURGICAL HISTORY:  Hypothyroid      Anxiety      Myasthenia gravis      Neuromyelitis optica      MG status post thymectomy (myasthenia gravis)  2005      History of appendectomy  2001        FAMILY HISTORY:    SOCIAL HISTORY:   T/E/D:   Occupation:   Lives with:     MEDICATIONS (HOME):  Home Medications:  Vitamin B Complex with C oral tablet: 1 tab(s) orally once a day (12 Oct 2021 01:24)    MEDICATIONS  (STANDING):    MEDICATIONS  (PRN):    ALLERGIES/INTOLERANCES:  Allergies  No Known Allergies    Intolerances    VITALS & EXAMINATION:  Vital Signs Last 24 Hrs  T(C): 37.7 (09 Dec 2023 17:39), Max: 37.7 (09 Dec 2023 17:39)  T(F): 99.8 (09 Dec 2023 17:39), Max: 99.8 (09 Dec 2023 17:39)  HR: 85 (09 Dec 2023 21:17) (85 - 95)  BP: 129/84 (09 Dec 2023 21:17) (129/84 - 145/105)  BP(mean): --  RR: 18 (09 Dec 2023 21:17) (18 - 20)  SpO2: 96% (09 Dec 2023 21:17) (96% - 98%)    Parameters below as of 09 Dec 2023 21:17  Patient On (Oxygen Delivery Method): room air        General:  Constitutional: Obese Female, appears stated age, in no apparent distress including pain  Head: Normocephalic & atraumatic.    Neurological (>12):  MS: Eyes open. Responds well to verbal stimuli. Awake, alert, oriented to person, place, situation, time. Normal affect. Follows all commands.    Language: Speech is clear, fluent with good repetition & comprehension     CNs: PERRL (R = 3mm, L = 3mm). VFF. EOMI no nystagmus, no diplopia. V1-3 intact to LT/pinprick, well developed masseter muscles b/l. No facial asymmetry b/l, full eye closure strength b/l. Hearing grossly impaired on L ear compared to R ear. Symmetric palate elevation in midline. Gag reflex deferred. Head turning & shoulder shrug intact b/l. Tongue midline, normal movements, no atrophy.    Motor: Normal muscle bulk & tone. No noticeable tremor or seizure. No pronator drift.              Deltoid	Biceps	Triceps	   R	5	5	5	5		  L	5	5	5	5	    	H-Flex	K-Flex	K-Ext	D-Flex	P-Flex  R	5	5	5	5	5		   L	5	5	5	5	5		     Sensation: Decreased LT/PP/Temp to LUE and LLE. Proprioception intact b/l throughout.     Cortical: Extinction on DSS on LLE    Reflexes: No pectoral              Biceps(C5)       BR(C6)     Triceps(C7)               Patellar(L4)    Achilles(S1)    Plantar Resp  R	2	          2	             2		        2		    2		Mute  L	2	          2	             2		        2		    2		Mute     Coordination: intact rapid-alt movements. No dysmetria to FTN    Gait: No postural instability. Normal stance and tandem gait.     LABORATORY:  CBC                       14.0   9.34  )-----------( 215      ( 09 Dec 2023 19:21 )             41.8     Chem 12-09    136  |  100  |  12  ----------------------------<  97  4.1   |  24  |  0.50    Ca    9.4      09 Dec 2023 19:21    TPro  8.3  /  Alb  4.5  /  TBili  0.5  /  DBili  x   /  AST  12  /  ALT  13  /  AlkPhos  40  12-09    LFTs LIVER FUNCTIONS - ( 09 Dec 2023 19:21 )  Alb: 4.5 g/dL / Pro: 8.3 g/dL / ALK PHOS: 40 U/L / ALT: 13 U/L / AST: 12 U/L / GGT: x           Coagulopathy PT/INR - ( 09 Dec 2023 19:21 )   PT: 11.7 sec;   INR: 1.07 ratio         PTT - ( 09 Dec 2023 19:21 )  PTT:35.9 sec  Lipid Panel   A1c   Cardiac enzymes     U/A Urinalysis Basic - ( 09 Dec 2023 19:21 )    Color: x / Appearance: x / SG: x / pH: x  Gluc: 97 mg/dL / Ketone: x  / Bili: x / Urobili: x   Blood: x / Protein: x / Nitrite: x   Leuk Esterase: x / RBC: x / WBC x   Sq Epi: x / Non Sq Epi: x / Bacteria: x      CSF  Immunological  Other    STUDIES & IMAGING:  Studies (EKG, EEG, EMG, etc):     Radiology (XR, CT, MR, U/S, TTE/KEIRA): HPI:  43 y.o. F with PMH of MG (binding/modulating AB blocking s/p thymectomy), hypothyroidism on levothyroxine 0.5 mcg, NMO (previously on inebilizumab, now getting Soliris q2 weeks, next dose next week, Dr. Gale at [place]) presented to [facility] ED on 12/9/23 due to R lateral abdomen region sharp shooting pain, similar to what she describes as her NMO flare. She had the diagnosis of NMO since 2020, seen Dr. Resendiz in the beginning part of the treatment, but failed treatment 3x, went to Dr. Gale for second opinion, which is the neurologist that she follows currently. In 2022, started on soliris and now on biweekly 1200 mg, which she will also receive next week. Pt reported that with the flare usually starts with worsening residual left sided numbness and sharp pain near the 10-12th rib region and lateral lower abdomen. Then it jumps to R side. This time, had it for 48 hours, aggrevated to squeezing type of pain and could not tolerate so came to ED. Currently s/p 1g of solumedrol and noted improved pain. Takes levothyroxine 0.5 mcg qd, ferrous sulfate 350 mg qd, and vitamin C/D complex. Denies any recent fever, chills, rhinorrea, speech difficulty, blurry vision, constipation/diarrhea. Lives at home with family, independent with ADLs for the most part. but sometimes has to use some assistance with walking.  Has hx of hospitalization back in 2021, with left sided back pain, found to have chronic lesion in T4-6 but no enhancement. Treated with 5 days of solumedrol and improved symptom.    REVIEW OF SYSTEMS  A 10-system ROS was performed and is negative except for those items noted above and/or in the HPI.    PAST MEDICAL & SURGICAL HISTORY:  Hypothyroid      Anxiety      Myasthenia gravis      Neuromyelitis optica      MG status post thymectomy (myasthenia gravis)  2005      History of appendectomy  2001        FAMILY HISTORY:    SOCIAL HISTORY:   T/E/D:   Occupation:   Lives with:     MEDICATIONS (HOME):  Home Medications:  Vitamin B Complex with C oral tablet: 1 tab(s) orally once a day (12 Oct 2021 01:24)    MEDICATIONS  (STANDING):    MEDICATIONS  (PRN):    ALLERGIES/INTOLERANCES:  Allergies  No Known Allergies    Intolerances    VITALS & EXAMINATION:  Vital Signs Last 24 Hrs  T(C): 37.7 (09 Dec 2023 17:39), Max: 37.7 (09 Dec 2023 17:39)  T(F): 99.8 (09 Dec 2023 17:39), Max: 99.8 (09 Dec 2023 17:39)  HR: 85 (09 Dec 2023 21:17) (85 - 95)  BP: 129/84 (09 Dec 2023 21:17) (129/84 - 145/105)  BP(mean): --  RR: 18 (09 Dec 2023 21:17) (18 - 20)  SpO2: 96% (09 Dec 2023 21:17) (96% - 98%)    Parameters below as of 09 Dec 2023 21:17  Patient On (Oxygen Delivery Method): room air        General:  Constitutional: Obese Female, appears stated age, in no apparent distress including pain  Head: Normocephalic & atraumatic.    Neurological (>12):  MS: Eyes open. Responds well to verbal stimuli. Awake, alert, oriented to person, place, situation, time. Normal affect. Follows all commands.    Language: Speech is clear, fluent with good repetition & comprehension     CNs: PERRL (R = 3mm, L = 3mm). VFF. EOMI no nystagmus, no diplopia. V1-3 intact to LT/pinprick, well developed masseter muscles b/l. No facial asymmetry b/l, full eye closure strength b/l. Hearing grossly impaired on L ear compared to R ear. Symmetric palate elevation in midline. Gag reflex deferred. Head turning & shoulder shrug intact b/l. Tongue midline, normal movements, no atrophy.    Motor: Normal muscle bulk & tone. No noticeable tremor or seizure. No pronator drift.              Deltoid	Biceps	Triceps	   R	5	5	5	5		  L	5	5	5	5	    	H-Flex	K-Flex	K-Ext	D-Flex	P-Flex  R	5	5	5	5	5		   L	5	5	5	5	5		     Sensation: Decreased LT/PP/Temp to LUE and LLE. Proprioception intact b/l throughout.     Cortical: Extinction on DSS on LLE    Reflexes: No pectoral              Biceps(C5)       BR(C6)     Triceps(C7)               Patellar(L4)    Achilles(S1)    Plantar Resp  R	2	          2	             2		        2		    2		Mute  L	2	          2	             2		        2		    2		Mute     Coordination: intact rapid-alt movements. No dysmetria to FTN    Gait: No postural instability. Normal stance and tandem gait.     LABORATORY:  CBC                       14.0   9.34  )-----------( 215      ( 09 Dec 2023 19:21 )             41.8     Chem 12-09    136  |  100  |  12  ----------------------------<  97  4.1   |  24  |  0.50    Ca    9.4      09 Dec 2023 19:21    TPro  8.3  /  Alb  4.5  /  TBili  0.5  /  DBili  x   /  AST  12  /  ALT  13  /  AlkPhos  40  12-09    LFTs LIVER FUNCTIONS - ( 09 Dec 2023 19:21 )  Alb: 4.5 g/dL / Pro: 8.3 g/dL / ALK PHOS: 40 U/L / ALT: 13 U/L / AST: 12 U/L / GGT: x           Coagulopathy PT/INR - ( 09 Dec 2023 19:21 )   PT: 11.7 sec;   INR: 1.07 ratio         PTT - ( 09 Dec 2023 19:21 )  PTT:35.9 sec  Lipid Panel   A1c   Cardiac enzymes     U/A Urinalysis Basic - ( 09 Dec 2023 19:21 )    Color: x / Appearance: x / SG: x / pH: x  Gluc: 97 mg/dL / Ketone: x  / Bili: x / Urobili: x   Blood: x / Protein: x / Nitrite: x   Leuk Esterase: x / RBC: x / WBC x   Sq Epi: x / Non Sq Epi: x / Bacteria: x      CSF  Immunological  Other    STUDIES & IMAGING:  Studies (EKG, EEG, EMG, etc):     Radiology (XR, CT, MR, U/S, TTE/KEIRA):

## 2023-12-09 NOTE — CONSULT NOTE ADULT - ASSESSMENT
43 y.o. F with PMH of MG (binding/modulating AB blocking s/p thymectomy), hypothyroidism on levothyroxine 0.5 mcg, NMO (previously on inebilizumab, now getting Soliris q2 weeks, next dose next week, Dr. Gale at [place]) presented to [facility] ED on 12/9/23 due to R lateral abdomen region sharp shooting pain, similar to what she describes as her NMO flare. S/p solumedrol 1 g x1 and symptom improving. Neuro exam revealing for residual L sided numbness to LT, pinprick, and extinction on LLE due to severe numbness. Temperature sensation mildly impaired on LUE and LLE. Labs normal.     Impression: R lateral abdominal squeezing pain, description wise consistent with prior flare ups of NMO, improved with solumedrol 1g x1. However, given location of the pain and absence of weakness, no indication for inpatient treatment at this time    Recommendations:  [] At this time, no indication for further IV steroid in hospital. If symptom comes back worsens, pt will report back, and we'll consider further IV steroid infusion (in the past, pt had required 5 days in 2021). No need for tapering to go home with at this time  [] Rest of work up per ED  [] Continue follow up with Dr. Gale at [place] and continue Soliris    Case discussed with general neurology attending Dr. rConin     43 y.o. F with PMH of MG (binding/modulating AB blocking s/p thymectomy), hypothyroidism on levothyroxine 0.5 mcg, NMO (previously on inebilizumab, now getting Soliris q2 weeks, next dose next week, Dr. Gale at [place]) presented to [facility] ED on 12/9/23 due to R lateral abdomen region sharp shooting pain, similar to what she describes as her NMO flare. S/p solumedrol 1 g x1 and symptom improving. Neuro exam revealing for residual L sided numbness to LT, pinprick, and extinction on LLE due to severe numbness. Temperature sensation mildly impaired on LUE and LLE. Labs normal.     Impression: R lateral abdominal squeezing pain, description wise consistent with prior flare ups of NMO, improved with solumedrol 1g x1. However, given location of the pain and absence of weakness, no indication for inpatient treatment at this time    Recommendations:  [] At this time, no indication for further IV steroid in hospital. If symptom comes back worsens, pt will report back, and we'll consider further IV steroid infusion (in the past, pt had required 5 days in 2021). No need for tapering to go home with at this time  [] Rest of work up per ED  [] Continue follow up with Dr. Gale at [place] and continue Soliris    Case discussed with general neurology attending Dr. Cronin

## 2023-12-09 NOTE — ED PROVIDER NOTE - OBJECTIVE STATEMENT
41 y.o. F with PMH of MG (binding/modulating Abs s/p thymectomy) in remission, NMO (previously on inebilizumab, now getting ready to be on Soliris to start on 10/19, follows with Dr. Resendiz at [address] [place] [address] and Dr. Gale at [place]), and hypothyroidism 41 y.o. F with PMH of MG (binding/modulating Abs s/p thymectomy) in remission, NMO (previously on inebilizumab, now getting ready to be on Soliris to start on 10/19, follows with Dr. Resendiz at [address] [place] [address] and Dr. Gale at [place]), and hypothyroidism 43 y.o. F with PMH of MG (binding/modulating Abs s/p thymectomy) in remission, NMO (previously on inebilizumab, now getting ready to be on Soliris to start on 10/19, follows with Dr. Resendiz at [address] [place] [address] and Dr. Gale at [place]), and hypothyroidism Presents with bilateral lateral chest wall pain which he describes as sharp in nature intermittent/episodic, nonradiating.  Patient has not taken anything for pain states that the only thing that helps is "steroids" patient states that prior to this she has a preceding viral illness characterized by fatigue and cough, patient states that she also has run out of her levothyroxine which she has been unable to take for the last 4 days.  Patient does not report any other symptoms no fever chills nausea vomiting headaches blurry vision recent travel or sick contacts.    General: Comfortable appearing   HEENT: neck supple, anicteric sclera  Cardiovascular: Normal s1, s2, RRR  Respiratory: CTA b/l   Abdominal: Soft, ntnd  Extremities: No swelling in LEs  Neurologic: Nonfocal  MSK: TTP lateral chest wall bilaterally  Psych: Awake, alert answering questions appropriately 43 y.o. F with PMH of MG (binding/modulating Abs s/p thymectomy) in remission, NMO (previously on inebilizumab, now getting ready to be on Soliris to start on 10/19, follows with Dr. Resendiz at [address] [place] [address] and Dr. Gale at [place]), and hypothyroidism Presents with bilateral lateral chest wall pain which he describes as sharp in nature intermittent/episodic, nonradiating.  Patient has not taken anything for pain states that the only thing that helps is "steroids" patient states that prior to this she has a preceding viral illness characterized by fatigue and cough, patient states that she also has run out of her levothyroxine which she has been unable to take for the last 4 days.  Patient does not report any other symptoms no fever chills nausea vomiting headaches blurry vision recent travel or sick contacts.    General: Comfortable appearing   HEENT: neck supple, anicteric sclera  Cardiovascular: Normal s1, s2, RRR  Respiratory: CTA b/l   Abdominal: Soft, ntnd  Extremities: No swelling in LEs  Neurologic: Nonfocal  MSK: TTP lateral chest wall bilaterally  Psych: Awake, alert answering questions appropriately

## 2023-12-09 NOTE — ED ADULT TRIAGE NOTE - TEMPERATURE IN FAHRENHEIT (DEGREES F)
[address] [facility]  [address] [address]  [address] [address]  Dept: [phone]  Dept Fax: [phone]  Loc: [phone]      Subjective:     Chief Complaint   Patient presents with    Follow-up     chest pain from 02-    Discuss Labs       HPI:  Marilee Linda is a 54 y.o. female presenting for      Met w/ GI, EGD and CLN done yesterday. Esophagus was dilated. Continues on protonnix 40mg. Holding celebrex until next visit with them. Describes some weakness in both hands R>L, along with some tingling in hands. Thinks is carpal tunnel tunnel. Has wrist splints that she sometimes wears during day if sx are bad. Anxiety-Referred to [facility], seeing female counselor there, going well so far. Follows up again for medicine consideration. Thyroid levels wnl, on levothyroxine 50mcg. Lipid levels at goal. On lipitor 20mg.     ROS:   Reviewed with patient and noted to be negative except that listed in HPI    PMHx:  Past Medical History:   Diagnosis Date    Anxiety     Asthma     born with asthmatic bronchitis    CPAP (continuous positive airway pressure) dependence     6cm to 16cm    Degenerative disk disease     Depression     mood disorders    Hemorrhoid 11/2015    Hyperlipidemia     Hypoglycemia     Hypothyroid     Left ureteral stone 5/25/2016    OAB (overactive bladder)     Obstructive sleep apnea     AHI: 37.9 per [facility], 10/2019    Obstructive sleep apnea 4/30/2020    Renal stone 5/25/2016     Patient Active Problem List   Diagnosis    External hemorrhoid    Internal hemorrhoids    Non morbid obesity due to excess calories    Psychophysiologic insomnia    Anxiety    Hypothyroidism    Hyperlipidemia    CPAP (continuous positive airway pressure) dependence    Obstructive sleep apnea    Otalgia of right ear    Tinnitus aurium, bilateral    Bilateral carpal tunnel syndrome    Gastroesophageal reflux disease without esophagitis PSHx:  Past Surgical History:   Procedure Laterality Date    BREAST BIOPSY Left 2018    benign calcification    CHOLECYSTECTOMY      COLONOSCOPY  2015    Dr Avilez-Internal hemorrhoids, repeat at age 47    COLONOSCOPY N/A 2022    Dr Mahsa Hollingsworth, Int hemorrhoids Gr 1, 5 year recall    ENDOMETRIAL ABLATION  2015    Dr. Leanna Mehta  2015    Hemorrhoidectomy & closed lateral internal sphincterotomy    HYSTEROSCOPY  2015    Dr. Sherwin Mccullough POLYPECTOMY  2015    Dr. Umesh Bob  14 years ago    UPPER GASTROINTESTINAL ENDOSCOPY N/A 2022    Dr Mahsa Hollingsworth, [address] 47 Fr dil,    UPPER GASTROINTESTINAL ENDOSCOPY  2022    Dr Mahsa Hollingsworth, Claudia Drop 47 Fr bougie dilation       PFHx:  Family History   Problem Relation Age of Onset    High Blood Pressure Mother     Other Mother         early alzheimers    High Blood Pressure Father     Diabetes Paternal Grandfather     Breast Cancer Paternal Aunt 29    Colon Cancer Neg Hx     Colon Polyps Neg Hx        SocialHx:  Social History     Tobacco Use    Smoking status: Former Smoker     Packs/day: 0.25     Years: 27.00     Pack years: 6.75     Quit date: 2009     Years since quittin.9    Smokeless tobacco: Former User    Tobacco comment: Not employed   Substance Use Topics    Alcohol use: Yes     Alcohol/week: 0.0 standard drinks     Comment: rare       Allergies:   Allergies   Allergen Reactions    Codeine Nausea And Vomiting, Swelling and Rash       Medications:  Current Outpatient Medications   Medication Sig Dispense Refill    pantoprazole (PROTONIX) 40 MG tablet TAKE 1 TABLET BY MOUTH EVERY DAY BEFORE BREAKFAST 30 tablet 0    ondansetron (ZOFRAN) 4 MG tablet Take 1 tablet by mouth 3 times daily as needed for Nausea or Vomiting 30 tablet 0    atorvastatin (LIPITOR) 20 MG tablet TAKE 1 TABLET BY MOUTH EVERY DAY 30 tablet 5    levocetirizine (XYZAL) 5 MG tablet TAKE 1 TABLET BY MOUTH EVERY DAY AT NIGHT 30 tablet 5    levothyroxine (SYNTHROID) 50 MCG tablet TAKE 1 TABLET BY MOUTH EVERY DAY 30 tablet 11    COMBIVENT RESPIMAT  MCG/ACT AERS inhaler INHALE 1 PUFF EVERY 4 HOURS AS NEEDED FOR WHEEZING 1 each 5    fluticasone (FLONASE) 50 MCG/ACT nasal spray SPRAY 2 SPRAYS INTO EACH NOSTRIL EVERY DAY 2 each 5    ondansetron (ZOFRAN ODT) 4 MG disintegrating tablet Place 1 tablet under the tongue every 8 hours as needed for Nausea or Vomiting 21 tablet 0    propranolol (INDERAL) 40 MG tablet TAKE 1 TABLET BY MOUTH THREE TIMES A  tablet 3    celecoxib (CELEBREX) 200 MG capsule Take 1 capsule by mouth daily (Patient not taking: Reported on 3/24/2022) 60 capsule 3     No current facility-administered medications for this visit. Objective:   PE:  /78   Pulse 71   Temp 97.7 °F (36.5 °C)   Ht 5' (1.524 m)   Wt 164 lb 11.2 oz (74.7 kg)   SpO2 98%   BMI 32.17 kg/m²   Physical Exam  Constitutional:       General: She is not in acute distress. Appearance: Normal appearance. HENT:      Head: Normocephalic. Nose: Nose normal.      Mouth/Throat:      Mouth: Mucous membranes are moist.      Pharynx: Oropharynx is clear. No oropharyngeal exudate or posterior oropharyngeal erythema. Eyes:      General: No scleral icterus. Extraocular Movements: Extraocular movements intact. Conjunctiva/sclera: Conjunctivae normal.   Cardiovascular:      Rate and Rhythm: Normal rate and regular rhythm. Pulses: Normal pulses. Heart sounds: No murmur heard. Pulmonary:      Effort: Pulmonary effort is normal.      Breath sounds: Normal breath sounds. Musculoskeletal:         General: Normal range of motion. Cervical back: Normal range of motion. Skin:     General: Skin is warm and dry. Capillary Refill: Capillary refill takes less than 2 seconds. Neurological:      General: No focal deficit present.       Mental Status: She is alert and oriented to person, place, and time. Comments: +Phalen's BL   Psychiatric:         Mood and Affect: Mood normal.         Behavior: Behavior normal.         Thought Content: Thought content normal.            Assessment & Plan   Cyril Ignacio was seen today for follow-up and discuss labs. Diagnoses and all orders for this visit:    Bilateral carpal tunnel syndrome  -Advised pt to start wearing wrist splints at night consistently. Will f/u at next visit in 4 weeks. May consider NCS or neurosurgery referral depending on severity  ]  Acquired hypothyroidism  -Continue levothyroxine 50mcg    Gastroesophageal reflux disease without esophagitis  -     pantoprazole (PROTONIX) 40 MG tablet; TAKE 1 TABLET BY MOUTH EVERY DAY BEFORE BREAKFAST    Mixed hyperlipidemia  -Continue lipitor 20mg    Anxiety  -Continue counseling w/ Misty      Return in about 4 weeks (around 4/21/2022) for Carpal tunnel f/u. All questions were answered. Medications, including possible adverse effects, and instructions were reviewed and  understanding was confirmed. Follow-up recommendations, including when to contact or return to office (ie; if symptoms worsen or fail to improve), were discussed and acknowledged.     Electronically signed by Zeke Terrell MD on 3/24/22 at 9:19 AM CDT 99.8

## 2023-12-09 NOTE — ED ADULT NURSE NOTE - OBJECTIVE STATEMENT
PT is a 43 yr old female with PMH of MG (binding/modulating Abs s/p thymectomy) in remission, NMO (previously on inebilizumab, now getting ready to be on Soliris to start on 10/19, follows with Dr. Resendiz at [address] [place] [address] and Dr. Gale at [place]), and hypothyroidism coming from home for a flare up. PT has left sided "lightning" pain that also shoots to her right side that feels tight. PT keeps stating "give me the steroids, they just have to start the steroids". Pt is a/ox 3 ambulatory and independent. PT vitals stable. PT is a 43 yr old female with PMH of MG (binding/modulating Abs s/p thymectomy) in remission, NMO (previously on inebilizumab, now getting ready to be on Soliris to start on 10/19, follows with Dr. Resendiz at [address] [place] [address] and Dr. Gale at [place]), and hypothyroidism coming from home for a flare up. PT has left sided "lightning" pain that also shoots to her right side that feels tight. PT keeps stating "give me the steroids, they just have to start the steroids". Pt is a/ox 3 ambulatory and independent. PT vitals stable.

## 2023-12-09 NOTE — ED ADULT NURSE NOTE - NSFALLUNIVINTERV_ED_ALL_ED
Bed/Stretcher in lowest position, wheels locked, appropriate side rails in place/Call bell, personal items and telephone in reach/Instruct patient to call for assistance before getting out of bed/chair/stretcher/Non-slip footwear applied when patient is off stretcher/[place] to call system/Physically safe environment - no spills, clutter or unnecessary equipment/Purposeful proactive rounding/Room/bathroom lighting operational, light cord in reach Bed/Stretcher in lowest position, wheels locked, appropriate side rails in place/Call bell, personal items and telephone in reach/Instruct patient to call for assistance before getting out of bed/chair/stretcher/Non-slip footwear applied when patient is off stretcher/[place] to call system/Physically safe environment - no spills, clutter or unnecessary equipment/Purposeful proactive rounding/Room/bathroom lighting operational, light cord in reach

## 2023-12-10 NOTE — ED POST DISCHARGE NOTE - ADDITIONAL DOCUMENTATION
120/10: pt states that she is having increased abdominal discomfort/GI upset after the ER stay yesterday, asking for script for discomfort. Discussed she would need to be reevaluated and if she feels like her symptoms are worsening/changing to come back to the ED for reevaluation, all questions answered. -Christiana Langford PA-C

## 2023-12-11 ENCOUNTER — EMERGENCY (EMERGENCY)
Facility: HOSPITAL | Age: 43
LOS: 1 days | Discharge: HOME | End: 2023-12-11
Attending: EMERGENCY MEDICINE
Payer: COMMERCIAL

## 2023-12-11 VITALS
OXYGEN SATURATION: 95 % | HEIGHT: 62 IN | RESPIRATION RATE: 18 BRPM | DIASTOLIC BLOOD PRESSURE: 78 MMHG | HEART RATE: 90 BPM | TEMPERATURE: 99 F | SYSTOLIC BLOOD PRESSURE: 129 MMHG | WEIGHT: 149.91 LBS

## 2023-12-11 DIAGNOSIS — Z90.49 ACQUIRED ABSENCE OF OTHER SPECIFIED PARTS OF DIGESTIVE TRACT: Chronic | ICD-10-CM

## 2023-12-11 DIAGNOSIS — G70.00 MYASTHENIA GRAVIS WITHOUT (ACUTE) EXACERBATION: Chronic | ICD-10-CM

## 2023-12-11 PROCEDURE — 99285 EMERGENCY DEPT VISIT HI MDM: CPT

## 2023-12-11 NOTE — ED ADULT TRIAGE NOTE - ESI TRIAGE ACUITY LEVEL, MLM
Holimum Laser Enucleation of Prostate Operative Note     Date: 10/24/2023  OR Location: [facility] OR    Name: Kumar Malave, : 1947, Age: 75 y.o., MRN: [id number], Sex: male    Diagnosis  Pre-op Diagnosis     * Benign prostatic hyperplasia with lower urinary tract symptoms [N40.1] Post-op Diagnosis     * Benign prostatic hyperplasia with lower urinary tract symptoms [N40.1]     Procedures    * Holimum Laser Enucleation of Prostate    Surgeons      * Marcelino Crook - Primary    Resident/Fellow/Other Assistant:  Surgeon(s) and Role:    Procedure Summary  Anesthesia: General  ASA: III  Anesthesia Staff: Anesthesiologist: Layton Dempsey MD  C-AA: PEGGY Dowling  Estimated Blood Loss: 50mL  Intra-op Medications:   Medication Name Total Dose   sodium chloride 0.9 % irrigation solution 3,000 mL              Anesthesia Record               Intraprocedure I/O Totals          Intake    LR 1000.00 mL    Phenylephrine Drip 0.00 mL    The total shown is the total volume documented since Anesthesia Start was filed.    Total Intake 1000 mL       Output    Est. Blood Loss 50 mL    Total Output 50 mL       Net    Net Volume 950 mL          Specimen:   ID Type Source Tests Collected by Time   1 : PROSTATE TISSUE Tissue PROSTATE HOLEP SURGICAL PATHOLOGY EXAM Marcelino Crook MD 10/24/2023 0936        Staff:   Circulator: Amandeep Pendleton RN; Erika Wilkins RN  Relief Circulator: Destiny Daniels RN  Relief Scrub: Yanique Adams  Scrub Person: Judith Olmos RN         Drains and/or Catheters:   Urethral Catheter 22 Fr. (Active)       Tourniquet Times:         Implants:     Findings: massive trilobar enlargment of the prostate >200g, difficult planes on L side, Uos very close but preserved. Modifier 22 for massive enlargement (220g on preop MRI) that added >1hr to the case    Indications: Kumar Malave is an 75 y.o. male who is having surgery for Benign prostatic hyperplasia with lower urinary tract symptoms [N40.1].     The  patient was seen in the preoperative area. The risks, benefits, complications, treatment options, non-operative alternatives, expected recovery and outcomes were discussed with the patient. The possibilities of reaction to medication, pulmonary aspiration, injury to surrounding structures, bleeding, recurrent infection, the need for additional procedures, failure to diagnose a condition, and creating a complication requiring transfusion or operation were discussed with the patient. The patient concurred with the proposed plan, giving informed consent.  The site of surgery was properly noted/marked if necessary per policy. The patient has been actively warmed in preoperative area. Preoperative antibiotics have been ordered and given within 1 hours of incision. Venous thrombosis prophylaxis have been ordered including bilateral sequential compression devices    Procedure Details:   1. HOLMIUM LASER ENUCLEATION OF THE PROSTATE        Laser Settings Used:  Cutting 2 J, 40 Hz.  Coagulation 1.5 J, 30 Hz.   Andrew or Virtual basket used? Andrew  Preoperative Prostate Size:  approx 220 cubic centimeters.     Prostate configuration: trilobar  Complication: none  EBL: 50 ml  Catheter: 22Fr 3 way  Antibiotics: ancef  Specimen: Morcellated prostatic tissue.    DESCRIPTION OF PROCEDURE:      The patient was brought tto he OR and after good general anesthesia was achieved, the patient was prepped and draped in dorsal lithotomy position. All pressure points were padded.  Surgical pause was performed.  The patient was identified using 2 identifiers.  The correct surgical procedure was confirmed.  All members of surgical and anesthesia team were in agreement.  The patient received prophylactic antibiotic and the procedure started.    Cystoscopy: The patient's bladder was first entered with a 22-Romansh rigid cystoscope with 30-degree lens.  Cystoscopic examination showed normal anterior urethra.  The posterior urethra showed msasive  trilobar enlargement of the prostate.  Both ureteral orifices were identified away from the bladder neck. The cystoscope was removed and the meatus was dilated up to 28-Croatian using sounds.  The urethra was then filled with lidocaine gel and a 26-Croatian Malave continuous-flow resectoscope was placed.  The holmium laser apparatus was placed through the sheath.  Using a 550-micron end-firing quartz laser fiber, a laser bridge, and a 7-Croatian laser stabilizing catheter, the procedure was started with the above-mentioned laser setting.    A en bloc technique was performed with an initial incision at the apex and circumferentially using low energy.  We continued to develop our anterior plane at the apex, identifying the capsule and freeing up the anterior apex well within the sphincter. We then proceeded with our posterior dissection, developing the posterior space toward the bladder neck and continuing our incision laterally to 9 and 3:00.    Then we turned our attention to the lateral attachments of the prostate.  Using laser energy, taking care to avoid any damage to the sphincter, we connected our previously dissected anterior and posterior planes to completely liberate the apex of the prostate preserving the sphincter. We then continued our dissection circumferentially, freeing the prostate systematically from apex to its attachments at the bladder neck. The adenoma was then pushed into the bladder.     Once the prostate had been fully enucleated, the laser was defocused on any sources of bleeding to get additional hemostasis.  There was good hemostasis at the conclusion of this procedure.  A few small remaining nodular adenomas were then resected from the capsule.      The laser bridge apparatus and the resectoscope were then removed and the offset Malave nephroscope and Malave - James tissue morcellator were then introduced and used to completely morcellate the tissue.  Two inflows were used during this portion of the  procedure to fully distend the bladder and to prevent any inadvertent bladder injury.  The bladder was then ellik'd out after insertion of the inner sheath and reinspected with the cystoscope showing no residual adenomas.  Hemostasis was ensured at the conclusion of the procedure and both ureteral orifices were confirmed and identified well away from the area of resection.  No residual adenoma could be identified.     Following that, a three-way Maxwell catheter on a guide was placed into the bladder and the balloon was filled.  The bladder was irrigated near clear and the continuous irrigation was started. 20mg IV lasix given    The patient tolerated the procedure well and was shifted to recovery in good general condition;  Complications:  None; patient tolerated the procedure well.    Disposition: PACU - hemodynamically stable.  Condition: stable         Additional Details:     Attending Attestation: I was present and scrubbed for the entire procedure.    Marcelino Crook  Phone Number: [phone]       2

## 2023-12-11 NOTE — ED ADULT TRIAGE NOTE - ACCOMPANIED BY
This is a 26M with no significant past medical history who presents with a chronic cough likely 2/2 combination of several factors (reactive airway disease, post nasal drip, and GERD) and tussive syncope. Spouse/Significant other

## 2023-12-11 NOTE — ED ADULT TRIAGE NOTE - NS ED TRIAGE AVPU SCALE
Alert-The patient is alert, awake and responds to voice. The patient is oriented to time, place, and person. The triage nurse is able to obtain subjective information. Progress Note, Physician


Chief Complaint: 


Mr Jerome says he is at fine. He says his shortness of breath is at baseline. 

No cp or n/v.





- Current Medication List


Current Medications: 


Active Medications





Albuterol Sulfate (Ventolin 0.083% Nebulizer Soln -)  1 amp NEB Q6H PRN


   PRN Reason: SHORT OF BREATH/WHEEZING


Allopurinol (Zyloprim -)  100 mg PO DAILY [facility]


   Last Admin: 02/05/18 09:22 Dose:  100 mg


Ascorbic Acid (Vitamin C -)  1,000 mg PO DAILY [facility]


   Last Admin: 02/05/18 09:22 Dose:  1,000 mg


Aspirin (Ecotrin -)  81 mg PO DAILY [facility]


   Last Admin: 02/05/18 09:23 Dose:  81 mg


Atorvastatin Calcium (Lipitor -)  40 mg PO HS [facility]


   Last Admin: 02/04/18 22:30 Dose:  40 mg


Budesonide/Formoterol Fumarate (Symbicort 160/4.5mcg -)  1 puff IH BID [facility]


   Last Admin: 02/05/18 09:23 Dose:  1 puff


Bupropion HCl (Wellbutrin -)  75 mg PO DAILY [facility]


   Last Admin: 02/05/18 09:22 Dose:  75 mg


Carvedilol (Coreg -)  6.25 mg PO BID [facility]


   Last Admin: 02/05/18 09:22 Dose:  6.25 mg


Cholecalciferol (Vitamin D3 -)  1,000 unit PO DAILY [facility]


   Last Admin: 02/05/18 09:23 Dose:  1,000 unit


Docusate Sodium (Colace -)  100 mg PO BID [facility]


   Last Admin: 02/05/18 09:23 Dose:  100 mg


Ferrous Sulfate (Feosol -)  325 mg PO Q48H [facility]


   Last Admin: 02/05/18 15:19 Dose:  325 mg


Milrinone Lactate/Dextrose (Milrinone 20mg/100ml Ivpb -)  20,000 mcg in 100 mls 

@ 8.906 mls/hr IVPB TITR [facility]; 0.35 MCG/KG/MIN


   PRN Reason: Protocol


   Last Admin: 02/05/18 12:26 Dose:  0.35 mcg/kg/min, 8.906 mls/hr


Azithromycin 250 mg/ Dextrose  250 mls @ 250 mls/hr IVPB DAILY [facility]


   Last Admin: 02/05/18 09:24 Dose:  250 mls/hr


CEFTRIAXONE 1 G/50 ML PREMIX (Ceftriaxone 1 Gm-D5w Bag)  50 mls @ 100 mls/hr 

IVPB DAILY [facility]


   Last Admin: 02/05/18 09:22 Dose:  100 mls/hr


Insulin Aspart (Novolog Vial Sliding Scale -)  1 vial SQ ACHS [facility]


   PRN Reason: Protocol


   Last Admin: 02/05/18 12:26 Dose:  10 units


Multivitamins/Minerals/Vitamin C (Tab-A-Vit -)  1 tab PO DAILY [facility]


   Last Admin: 02/05/18 09:22 Dose:  1 tab


Ranitidine HCl (Zantac -)  75 mg PO DAILY [facility]


   Last Admin: 02/05/18 09:23 Dose:  75 mg


Spironolactone (Aldactone -)  25 mg PO DAILY [facility]


   Last Admin: 02/05/18 09:23 Dose:  25 mg


Torsemide (Demadex -)  20 mg PO BID [facility]


   Last Admin: 02/05/18 09:23 Dose:  20 mg


Warfarin Sodium (Coumadin -)  2 mg PO DAILY@1800 [facility]


   Last Admin: 02/03/18 19:00 Dose:  2 mg











- Objective


Vital Signs: 


 Vital Signs











Temperature  36.3 C L  02/05/18 14:36


 


Pulse Rate  75   02/05/18 14:36


 


Respiratory Rate  22   02/05/18 14:36


 


Blood Pressure  95/60   02/05/18 14:36


 


O2 Sat by Pulse Oximetry (%)  96   02/05/18 09:00











Constitutional: Yes: Well Nourished, No Distress, Calm


Cardiovascular: Yes: Regular Rate and Rhythm.  No: Gallop, Murmur, Rub


Respiratory: Yes: Regular, On Nasal O2, Rhonchi.  No: CTA Bilaterally, Rales, 

Wheezes


Gastrointestinal: Yes: Normal Bowel Sounds, Soft.  No: Distention, Tenderness


Extremities: Yes: WNL


Edema: Yes


Edema: LLE: 1+, RLE: 1+


Labs: 


 CBC, BMP





 02/05/18 06:20 





 02/05/18 06:20 





 INR, PTT











INR  3.56  (0.82-1.09)  H  02/05/18  06:20    














Problem List





- Problems


(1) Urinary tract infection


Assessment/Plan: 


-urinalysis positive for UTI


-urine culture ordered today


-continue rocephin


-spoke with wife, says was on oral antibiotics and did not improve


-continue IV antibiotics secondary to failed outpatient therapy


Code(s): N39.0 - URINARY TRACT INFECTION, SITE NOT SPECIFIED   





(2) CHF (congestive heart failure)


Assessment/Plan: 


-end stage on milrinone


-continue current management


-at baseline


Code(s): I50.9 - HEART FAILURE, UNSPECIFIED   


Qualifiers: 


   Congestive heart failure type: diastolic   Congestive heart failure 

chronicity: chronic   Qualified Code(s): I50.32 - Chronic diastolic (congestive

) heart failure   





(3) COPD (chronic obstructive pulmonary disease)


Assessment/Plan: 


-appreciate pulmonary assistance


-continue current management


Code(s): J44.9 - CHRONIC OBSTRUCTIVE PULMONARY DISEASE, UNSPECIFIED   





(4) CAD (coronary artery disease)


Assessment/Plan: 


-continue home regimen


Code(s): I25.10 - ATHSCL HEART DISEASE OF NATIVE CORONARY ARTERY W/O ANG PCTRS 

  





(5) CKD (chronic kidney disease)


Assessment/Plan: 


-at baseline


Code(s): N18.9 - CHRONIC KIDNEY DISEASE, UNSPECIFIED   


Qualifiers: 


 





(6) Diabetes


Assessment/Plan: 


-diabetic diet


-SSI


Code(s): E11.9 - TYPE 2 DIABETES MELLITUS WITHOUT COMPLICATIONS   


Qualifiers: 


   Diabetes mellitus type: type 2   Diabetes mellitus complication status: with 

kidney complications   Diabetes mellitus complication detail: with chronic 

kidney disease   Chronic kidney disease stage: stage 4 (severe)

## 2023-12-11 NOTE — ED ADULT TRIAGE NOTE - CHIEF COMPLAINT QUOTE
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

## 2023-12-12 VITALS
SYSTOLIC BLOOD PRESSURE: 124 MMHG | HEART RATE: 83 BPM | TEMPERATURE: 98 F | OXYGEN SATURATION: 96 % | DIASTOLIC BLOOD PRESSURE: 82 MMHG | RESPIRATION RATE: 18 BRPM

## 2023-12-12 LAB
ALBUMIN SERPL ELPH-MCNC: 4.4 G/DL — SIGNIFICANT CHANGE UP (ref 3.3–5)
ALBUMIN SERPL ELPH-MCNC: 4.4 G/DL — SIGNIFICANT CHANGE UP (ref 3.3–5)
ALP SERPL-CCNC: 37 U/L — LOW (ref 40–120)
ALP SERPL-CCNC: 37 U/L — LOW (ref 40–120)
ALT FLD-CCNC: 14 U/L — SIGNIFICANT CHANGE UP (ref 10–45)
ALT FLD-CCNC: 14 U/L — SIGNIFICANT CHANGE UP (ref 10–45)
ANION GAP SERPL CALC-SCNC: 12 MMOL/L — SIGNIFICANT CHANGE UP (ref 5–17)
ANION GAP SERPL CALC-SCNC: 12 MMOL/L — SIGNIFICANT CHANGE UP (ref 5–17)
APPEARANCE UR: CLEAR — SIGNIFICANT CHANGE UP
APPEARANCE UR: CLEAR — SIGNIFICANT CHANGE UP
AST SERPL-CCNC: 15 U/L — SIGNIFICANT CHANGE UP (ref 10–40)
AST SERPL-CCNC: 15 U/L — SIGNIFICANT CHANGE UP (ref 10–40)
BACTERIA # UR AUTO: NEGATIVE /HPF — SIGNIFICANT CHANGE UP
BACTERIA # UR AUTO: NEGATIVE /HPF — SIGNIFICANT CHANGE UP
BASOPHILS # BLD AUTO: 0.06 K/UL — SIGNIFICANT CHANGE UP (ref 0–0.2)
BASOPHILS # BLD AUTO: 0.06 K/UL — SIGNIFICANT CHANGE UP (ref 0–0.2)
BASOPHILS NFR BLD AUTO: 0.5 % — SIGNIFICANT CHANGE UP (ref 0–2)
BASOPHILS NFR BLD AUTO: 0.5 % — SIGNIFICANT CHANGE UP (ref 0–2)
BILIRUB SERPL-MCNC: 0.3 MG/DL — SIGNIFICANT CHANGE UP (ref 0.2–1.2)
BILIRUB SERPL-MCNC: 0.3 MG/DL — SIGNIFICANT CHANGE UP (ref 0.2–1.2)
BILIRUB UR-MCNC: NEGATIVE — SIGNIFICANT CHANGE UP
BILIRUB UR-MCNC: NEGATIVE — SIGNIFICANT CHANGE UP
BUN SERPL-MCNC: 15 MG/DL — SIGNIFICANT CHANGE UP (ref 7–23)
BUN SERPL-MCNC: 15 MG/DL — SIGNIFICANT CHANGE UP (ref 7–23)
CALCIUM SERPL-MCNC: 9.2 MG/DL — SIGNIFICANT CHANGE UP (ref 8.4–10.5)
CALCIUM SERPL-MCNC: 9.2 MG/DL — SIGNIFICANT CHANGE UP (ref 8.4–10.5)
CAST: 0 /LPF — SIGNIFICANT CHANGE UP (ref 0–4)
CAST: 0 /LPF — SIGNIFICANT CHANGE UP (ref 0–4)
CHLORIDE SERPL-SCNC: 102 MMOL/L — SIGNIFICANT CHANGE UP (ref 96–108)
CHLORIDE SERPL-SCNC: 102 MMOL/L — SIGNIFICANT CHANGE UP (ref 96–108)
CO2 SERPL-SCNC: 23 MMOL/L — SIGNIFICANT CHANGE UP (ref 22–31)
CO2 SERPL-SCNC: 23 MMOL/L — SIGNIFICANT CHANGE UP (ref 22–31)
COLOR SPEC: YELLOW — SIGNIFICANT CHANGE UP
COLOR SPEC: YELLOW — SIGNIFICANT CHANGE UP
CREAT SERPL-MCNC: 0.49 MG/DL — LOW (ref 0.5–1.3)
CREAT SERPL-MCNC: 0.49 MG/DL — LOW (ref 0.5–1.3)
DIFF PNL FLD: ABNORMAL
DIFF PNL FLD: ABNORMAL
EGFR: 119 ML/MIN/1.73M2 — SIGNIFICANT CHANGE UP
EGFR: 119 ML/MIN/1.73M2 — SIGNIFICANT CHANGE UP
EOSINOPHIL # BLD AUTO: 0.07 K/UL — SIGNIFICANT CHANGE UP (ref 0–0.5)
EOSINOPHIL # BLD AUTO: 0.07 K/UL — SIGNIFICANT CHANGE UP (ref 0–0.5)
EOSINOPHIL NFR BLD AUTO: 0.6 % — SIGNIFICANT CHANGE UP (ref 0–6)
EOSINOPHIL NFR BLD AUTO: 0.6 % — SIGNIFICANT CHANGE UP (ref 0–6)
GLUCOSE SERPL-MCNC: 106 MG/DL — HIGH (ref 70–99)
GLUCOSE SERPL-MCNC: 106 MG/DL — HIGH (ref 70–99)
GLUCOSE UR QL: >=1000 MG/DL
GLUCOSE UR QL: >=1000 MG/DL
HCG SERPL-ACNC: <2 MIU/ML — SIGNIFICANT CHANGE UP
HCG SERPL-ACNC: <2 MIU/ML — SIGNIFICANT CHANGE UP
HCT VFR BLD CALC: 41.6 % — SIGNIFICANT CHANGE UP (ref 34.5–45)
HCT VFR BLD CALC: 41.6 % — SIGNIFICANT CHANGE UP (ref 34.5–45)
HGB BLD-MCNC: 14.1 G/DL — SIGNIFICANT CHANGE UP (ref 11.5–15.5)
HGB BLD-MCNC: 14.1 G/DL — SIGNIFICANT CHANGE UP (ref 11.5–15.5)
IMM GRANULOCYTES NFR BLD AUTO: 0.4 % — SIGNIFICANT CHANGE UP (ref 0–0.9)
IMM GRANULOCYTES NFR BLD AUTO: 0.4 % — SIGNIFICANT CHANGE UP (ref 0–0.9)
KETONES UR-MCNC: NEGATIVE MG/DL — SIGNIFICANT CHANGE UP
KETONES UR-MCNC: NEGATIVE MG/DL — SIGNIFICANT CHANGE UP
LEUKOCYTE ESTERASE UR-ACNC: NEGATIVE — SIGNIFICANT CHANGE UP
LEUKOCYTE ESTERASE UR-ACNC: NEGATIVE — SIGNIFICANT CHANGE UP
LYMPHOCYTES # BLD AUTO: 15.7 % — SIGNIFICANT CHANGE UP (ref 13–44)
LYMPHOCYTES # BLD AUTO: 15.7 % — SIGNIFICANT CHANGE UP (ref 13–44)
LYMPHOCYTES # BLD AUTO: 2 K/UL — SIGNIFICANT CHANGE UP (ref 1–3.3)
LYMPHOCYTES # BLD AUTO: 2 K/UL — SIGNIFICANT CHANGE UP (ref 1–3.3)
MAGNESIUM SERPL-MCNC: 2 MG/DL — SIGNIFICANT CHANGE UP (ref 1.6–2.6)
MAGNESIUM SERPL-MCNC: 2 MG/DL — SIGNIFICANT CHANGE UP (ref 1.6–2.6)
MCHC RBC-ENTMCNC: 29.1 PG — SIGNIFICANT CHANGE UP (ref 27–34)
MCHC RBC-ENTMCNC: 29.1 PG — SIGNIFICANT CHANGE UP (ref 27–34)
MCHC RBC-ENTMCNC: 33.9 GM/DL — SIGNIFICANT CHANGE UP (ref 32–36)
MCHC RBC-ENTMCNC: 33.9 GM/DL — SIGNIFICANT CHANGE UP (ref 32–36)
MCV RBC AUTO: 85.8 FL — SIGNIFICANT CHANGE UP (ref 80–100)
MCV RBC AUTO: 85.8 FL — SIGNIFICANT CHANGE UP (ref 80–100)
MONOCYTES # BLD AUTO: 0.74 K/UL — SIGNIFICANT CHANGE UP (ref 0–0.9)
MONOCYTES # BLD AUTO: 0.74 K/UL — SIGNIFICANT CHANGE UP (ref 0–0.9)
MONOCYTES NFR BLD AUTO: 5.8 % — SIGNIFICANT CHANGE UP (ref 2–14)
MONOCYTES NFR BLD AUTO: 5.8 % — SIGNIFICANT CHANGE UP (ref 2–14)
NEUTROPHILS # BLD AUTO: 9.79 K/UL — HIGH (ref 1.8–7.4)
NEUTROPHILS # BLD AUTO: 9.79 K/UL — HIGH (ref 1.8–7.4)
NEUTROPHILS NFR BLD AUTO: 77 % — SIGNIFICANT CHANGE UP (ref 43–77)
NEUTROPHILS NFR BLD AUTO: 77 % — SIGNIFICANT CHANGE UP (ref 43–77)
NITRITE UR-MCNC: NEGATIVE — SIGNIFICANT CHANGE UP
NITRITE UR-MCNC: NEGATIVE — SIGNIFICANT CHANGE UP
NRBC # BLD: 0 /100 WBCS — SIGNIFICANT CHANGE UP (ref 0–0)
NRBC # BLD: 0 /100 WBCS — SIGNIFICANT CHANGE UP (ref 0–0)
PH UR: 6.5 — SIGNIFICANT CHANGE UP (ref 5–8)
PH UR: 6.5 — SIGNIFICANT CHANGE UP (ref 5–8)
PHOSPHATE SERPL-MCNC: 3.7 MG/DL — SIGNIFICANT CHANGE UP (ref 2.5–4.5)
PHOSPHATE SERPL-MCNC: 3.7 MG/DL — SIGNIFICANT CHANGE UP (ref 2.5–4.5)
PLATELET # BLD AUTO: 228 K/UL — SIGNIFICANT CHANGE UP (ref 150–400)
PLATELET # BLD AUTO: 228 K/UL — SIGNIFICANT CHANGE UP (ref 150–400)
POTASSIUM SERPL-MCNC: 3.9 MMOL/L — SIGNIFICANT CHANGE UP (ref 3.5–5.3)
POTASSIUM SERPL-MCNC: 3.9 MMOL/L — SIGNIFICANT CHANGE UP (ref 3.5–5.3)
POTASSIUM SERPL-SCNC: 3.9 MMOL/L — SIGNIFICANT CHANGE UP (ref 3.5–5.3)
POTASSIUM SERPL-SCNC: 3.9 MMOL/L — SIGNIFICANT CHANGE UP (ref 3.5–5.3)
PROT SERPL-MCNC: 7.9 G/DL — SIGNIFICANT CHANGE UP (ref 6–8.3)
PROT SERPL-MCNC: 7.9 G/DL — SIGNIFICANT CHANGE UP (ref 6–8.3)
PROT UR-MCNC: NEGATIVE MG/DL — SIGNIFICANT CHANGE UP
PROT UR-MCNC: NEGATIVE MG/DL — SIGNIFICANT CHANGE UP
RBC # BLD: 4.85 M/UL — SIGNIFICANT CHANGE UP (ref 3.8–5.2)
RBC # BLD: 4.85 M/UL — SIGNIFICANT CHANGE UP (ref 3.8–5.2)
RBC # FLD: 12.6 % — SIGNIFICANT CHANGE UP (ref 10.3–14.5)
RBC # FLD: 12.6 % — SIGNIFICANT CHANGE UP (ref 10.3–14.5)
RBC CASTS # UR COMP ASSIST: 0 /HPF — SIGNIFICANT CHANGE UP (ref 0–4)
RBC CASTS # UR COMP ASSIST: 0 /HPF — SIGNIFICANT CHANGE UP (ref 0–4)
SODIUM SERPL-SCNC: 137 MMOL/L — SIGNIFICANT CHANGE UP (ref 135–145)
SODIUM SERPL-SCNC: 137 MMOL/L — SIGNIFICANT CHANGE UP (ref 135–145)
SP GR SPEC: 1.01 — SIGNIFICANT CHANGE UP (ref 1–1.03)
SP GR SPEC: 1.01 — SIGNIFICANT CHANGE UP (ref 1–1.03)
SQUAMOUS # UR AUTO: 0 /HPF — SIGNIFICANT CHANGE UP (ref 0–5)
SQUAMOUS # UR AUTO: 0 /HPF — SIGNIFICANT CHANGE UP (ref 0–5)
UROBILINOGEN FLD QL: 0.2 MG/DL — SIGNIFICANT CHANGE UP (ref 0.2–1)
UROBILINOGEN FLD QL: 0.2 MG/DL — SIGNIFICANT CHANGE UP (ref 0.2–1)
WBC # BLD: 12.71 K/UL — HIGH (ref 3.8–10.5)
WBC # BLD: 12.71 K/UL — HIGH (ref 3.8–10.5)
WBC # FLD AUTO: 12.71 K/UL — HIGH (ref 3.8–10.5)
WBC # FLD AUTO: 12.71 K/UL — HIGH (ref 3.8–10.5)
WBC UR QL: 2 /HPF — SIGNIFICANT CHANGE UP (ref 0–5)
WBC UR QL: 2 /HPF — SIGNIFICANT CHANGE UP (ref 0–5)

## 2023-12-12 PROCEDURE — 80053 COMPREHEN METABOLIC PANEL: CPT

## 2023-12-12 PROCEDURE — 87086 URINE CULTURE/COLONY COUNT: CPT

## 2023-12-12 PROCEDURE — 84702 CHORIONIC GONADOTROPIN TEST: CPT

## 2023-12-12 PROCEDURE — 84100 ASSAY OF PHOSPHORUS: CPT

## 2023-12-12 PROCEDURE — 81001 URINALYSIS AUTO W/SCOPE: CPT

## 2023-12-12 PROCEDURE — 83735 ASSAY OF MAGNESIUM: CPT

## 2023-12-12 PROCEDURE — 85025 COMPLETE CBC W/AUTO DIFF WBC: CPT

## 2023-12-12 PROCEDURE — 99283 EMERGENCY DEPT VISIT LOW MDM: CPT

## 2023-12-12 NOTE — ED PROVIDER NOTE - OBJECTIVE STATEMENT
44 yo F w/ hx of MG (binding/modulating AB blocking s/p thymectomy), hypothyroidism on levothyroxine 0.5 mcg, NMO (previously on inebilizumab, now getting Soliris q2 weeks, next dose next week, Dr. Gale at [place]) p/w c/o R flank spasm. Pt follows w/ neuro for NMO sx, chronic L sided paresthesias. Pt has gabapentin but when she has acute flares requires solumedrol. Pt seen on 12/9/23 and received 1x 1000 mg solumedrol. States she requires 3000mg solumedrol when she has flares, so initially felt well but had recurrent pain once home. told by neurology to return if her sx persists or worsen. 44 yo F w/ hx of MG (binding/modulating AB blocking s/p thymectomy), hypothyroidism on levothyroxine 0.5 mcg, NMO (previously on inebilizumab, now getting Soliris q2 weeks, next dose next week, Dr. Gale at [place]) p/w c/o R flank spasm. Pt follows w/ neuro for NMO sx, chronic L sided paresthesias. Pt has gabapentin but when she has acute flares requires solumedrol. Pt seen on 12/9/23 and received 1x 1000 mg solumedrol. States she requires 3000mg solumedrol when she has flares, so initially felt well but had recurrent pain once home. told by neurology to return if her sx persists or worsen.

## 2023-12-12 NOTE — ED ADULT NURSE NOTE - OBJECTIVE STATEMENT
pt is a 44yo female PMH neuromyelitis optica, hypothyroidism presenting to the ED complaining of NMO flair up. pt was recently seen and DCd in ED for NMO flair up, reports chronic L sided "twisting pressure" due to NMO, states that this pain has shifted to the R side of the torso due to flair, describes pain as "deep, twisting, unbearable", pt reports relief of symptoms when given IV steroids, coming to ED to be admitted for symptom management via steroids. pt reports improvement of pain upon being brought back to ED purple from triage. pt A&Ox3 gross neuro intact, no difficulty speaking in complete sentences, pulses x 4, holliday x4, abdomen soft nontender nondistended, skin intact. pt denies chest pain, sob, ha, n/v/d, abdominal pain, f/c, urinary symptoms, hematuria pt is a 42yo female PMH neuromyelitis optica, hypothyroidism presenting to the ED complaining of NMO flair up. pt was recently seen and DCd in ED for NMO flair up, reports chronic L sided "twisting pressure" due to NMO, states that this pain has shifted to the R side of the torso due to flair, describes pain as "deep, twisting, unbearable", pt reports relief of symptoms when given IV steroids, coming to ED to be admitted for symptom management via steroids. pt reports improvement of pain upon being brought back to ED purple from triage. pt A&Ox3 gross neuro intact, no difficulty speaking in complete sentences, pulses x 4, holliday x4, abdomen soft nontender nondistended, skin intact. pt denies chest pain, sob, ha, n/v/d, abdominal pain, f/c, urinary symptoms, hematuria

## 2023-12-12 NOTE — ED PROVIDER NOTE - CHIEF COMPLAINT
Answers for HPI/ROS submitted by the patient on 9/4/2020   activity change: No  unexpected weight change: No  neck pain: No  hearing loss: No  rhinorrhea: No  trouble swallowing: No  eye discharge: No  visual disturbance: No  chest tightness: No  wheezing: No  chest pain: No  palpitations: No  blood in stool: No  constipation: No  vomiting: No  diarrhea: No  polydipsia: No  polyuria: No  difficulty urinating: No  hematuria: No  menstrual problem: No  dysuria: No  joint swelling: No  arthralgias: No  headaches: No  weakness: No  confusion: No  dysphoric mood: Yes    The patient location is: [place]  The chief complaint leading to consultation is: depression    Visit type: audiovisual    Face to Face time with patient: 12 minutes    15 minutes of total time spent on the encounter, which includes face to face time and non-face to face time preparing to see the patient (eg, review of tests), Obtaining and/or reviewing separately obtained history, Documenting clinical information in the electronic or other health record, Independently interpreting results (not separately reported) and communicating results to the patient/family/caregiver, or Care coordination (not separately reported).     Each patient to whom he or she provides medical services by telemedicine is:  (1) informed of the relationship between the physician and patient and the respective role of any other health care provider with respect to management of the patient; and (2) notified that he or she may decline to receive medical services by telemedicine and may withdraw from such care at any time.    Subjective:       Patient ID: Shasta Estrada is a 25 y.o. female.    Chief Complaint: Depression    HPI follow up on depression. Feels like she is getting more depressed. Is on Lexapro 10mg. She has been on this dose for some time now.She denies thoughts of wanting to harm herself or others. She is really busy with her [employer] business. She is having  more allergy type symptoms so has added benadryl at night and still taking zyrtec in the mornings. She denies any symptoms of infection. She has no other concerns. See ROS.    The following portion of the patients history was reviewed and updated as appropriate: allergies, current medications, past medical and surgical history. Past social history and problem list reviewed. Family PMH and Past social history reviewed. Tobacco, Illicit drug use reviewed.      Review of patient's allergies indicates:  No Known Allergies      Current Outpatient Medications:     ARMOUR THYROID 120 mg Tab, TAKE 1 TABLET BY MOUTH EVERY DAY, Disp: 30 tablet, Rfl: 6    diphenhydrAMINE (BENADRYL) 50 MG capsule, Take 50 mg by mouth nightly as needed for Itching., Disp: , Rfl:     ergocalciferol (ERGOCALCIFEROL) 50,000 unit Cap, Take 1 capsule (50,000 Units total) by mouth every 7 days., Disp: 12 capsule, Rfl: 3    escitalopram oxalate (LEXAPRO) 10 MG tablet, Take 1 tablet (10 mg total) by mouth every other day., Disp: 30 tablet, Rfl: 6    MULTIVIT-MIN/FOLIC ACID/BIOTIN (HAIR,SKIN AND NAILS,FA-BIOTIN, ORAL), Take by mouth., Disp: , Rfl:     Past Medical History:   Diagnosis Date    Depression     Hypothyroid        Past Surgical History:   Procedure Laterality Date    DILATION AND CURETTAGE OF UTERUS  04/07/2017    removal of retained placenta    gastric sleeve  01/2020    THYROIDECTOMY  06/10/2010    wisdom tooth removal  09/2013       Social History     Socioeconomic History    Marital status:      Spouse name: Not on file    Number of children: 2    Years of education: Not on file    Highest education level: Not on file   Occupational History    Not on file   Social Needs    Financial resource strain: Not on file    Food insecurity     Worry: Not on file     Inability: Not on file    Transportation needs     Medical: Not on file     Non-medical: Not on file   Tobacco Use    Smoking status: Never Smoker     Smokeless tobacco: Never Used   Substance and Sexual Activity    Alcohol use: No    Drug use: No    Sexual activity: Yes     Partners: Male     Birth control/protection: OCP   Lifestyle    Physical activity     Days per week: Not on file     Minutes per session: Not on file    Stress: Not on file   Relationships    Social connections     Talks on phone: Not on file     Gets together: Not on file     Attends [employer] service: Not on file     Active member of club or organization: Not on file     Attends meetings of clubs or organizations: Not on file     Relationship status: Not on file   Other Topics Concern    Not on file   Social History Narrative    Not on file     Review of Systems   Constitutional: Negative for activity change, fatigue, fever and unexpected weight change.   HENT: Positive for postnasal drip and rhinorrhea. Negative for congestion and trouble swallowing.    Eyes: Negative for discharge and visual disturbance.   Respiratory: Negative for cough, chest tightness, shortness of breath and wheezing.    Cardiovascular: Negative for chest pain and palpitations.   Gastrointestinal: Negative for abdominal pain, diarrhea, nausea and vomiting.   Genitourinary: Negative for difficulty urinating and dysuria.   Musculoskeletal: Negative for arthralgias, back pain and gait problem.   Neurological: Negative for weakness and headaches.   Psychiatric/Behavioral: Positive for dysphoric mood. Negative for confusion and sleep disturbance. The patient is not nervous/anxious.        Objective:       Physical Exam  Constitutional:       General: She is not in acute distress.     Appearance: She is well-developed. She is not diaphoretic.   HENT:      Head: Normocephalic and atraumatic.      Nose: Nose normal.   Eyes:      Conjunctiva/sclera: Conjunctivae normal.      Right eye: No exudate.     Left eye: No exudate.  Neck:      Musculoskeletal: Normal range of motion. No edema.      Thyroid: No thyromegaly.       Trachea: Trachea normal. No tracheal tenderness or tracheal deviation.   Pulmonary:      Effort: No accessory muscle usage or respiratory distress.   Abdominal:      Palpations: There is no splenomegaly.   Musculoskeletal:      Comments: Gait and coordination normal   Skin:     General: Skin is dry.      Coloration: Skin is not pale.      Findings: No lesion or rash.   Neurological:      Mental Status: She is alert and oriented to person, place, and time.   Psychiatric:         Mood and Affect: Mood normal.         Speech: Speech normal.         Behavior: Behavior normal.         Thought Content: Thought content normal.         Assessment:       1. Depression, unspecified depression type    2. Allergic rhinitis, unspecified seasonality, unspecified trigger        Plan:       Depression, unspecified depression type: increase lexapro to 20mg daily. Take as directed. Follow up in 3 weeks.  -      escitalopram oxalate (LEXAPRO) 20 MG tablet; Take 1 tablet (20 mg total) by mouth every other day.  Dispense: 90 tablet; Refill: 1    Allergic rhinitis: use flonase and claritin daily.      Continue current medication  Take medications only as prescribed  Healthy diet, exercise  Adequate rest  Adequate hydration  Avoid allergens  Avoid excessive caffeine     follow up if not improving.   The patient is a 43y Female complaining of

## 2023-12-12 NOTE — ED PROVIDER NOTE - CLINICAL SUMMARY MEDICAL DECISION MAKING FREE TEXT BOX
44 yo F w/ hx of MG (binding/modulating AB blocking s/p thymectomy), hypothyroidism on levothyroxine 0.5 mcg, NMO (previously on inebilizumab, now getting Soliris q2 weeks, next dose next week, Dr. Gale at [place]) p/w c/o R flank spasm. Differential diagnosis includes but is not limited to NMO flare, muscle spasm, Zoster(?). would check labs/urine and discuss case w/ neurology. Pt well appearing. 44 yo F w/ hx of MG (binding/modulating AB blocking s/p thymectomy), hypothyroidism on levothyroxine 0.5 mcg, NMO (previously on inebilizumab, now getting Soliris q2 weeks, next dose next week, Dr. Gale at [place]) p/w c/o R flank spasm. Differential diagnosis includes but is not limited to NMO flare, muscle spasm, Zoster(?). would check labs/urine and discuss case w/ neurology. Pt well appearing. 44 yo F w/ hx of MG (binding/modulating AB blocking s/p thymectomy), hypothyroidism on levothyroxine 0.5 mcg, NMO (previously on inebilizumab, now getting Soliris q2 weeks, next dose next week, Dr. Gale at [place]) p/w c/o R flank spasm. Differential diagnosis includes but is not limited to NMO flare, muscle spasm, Zoster(?). would check labs/urine and discuss case w/ neurology. Pt well appearing.    GENESIS Hightower MD: Agree with resident/ACP MDM, assessment and plan as above. Pt seen here yesterday for similar sx, chart and Neurology consult reviewed. Pt rec'd 1g solumedrol yesterday, was advised to return for worsening symptoms. Returns today for L-sided "torso" pain. PT requesting IV solumedrol "3 grams." Denies AP, F/C, CP, SOB. Neurologically intact, ambulatory. Plan for basic labwork, consult Neurology for possible IV steroid tx 42 yo F w/ hx of MG (binding/modulating AB blocking s/p thymectomy), hypothyroidism on levothyroxine 0.5 mcg, NMO (previously on inebilizumab, now getting Soliris q2 weeks, next dose next week, Dr. Gale at [place]) p/w c/o R flank spasm. Differential diagnosis includes but is not limited to NMO flare, muscle spasm, Zoster(?). would check labs/urine and discuss case w/ neurology. Pt well appearing.    GENESIS Hightower MD: Agree with resident/ACP MDM, assessment and plan as above. Pt seen here yesterday for similar sx, chart and Neurology consult reviewed. Pt rec'd 1g solumedrol yesterday, was advised to return for worsening symptoms. Returns today for L-sided "torso" pain. PT requesting IV solumedrol "3 grams." Denies AP, F/C, CP, SOB. Neurologically intact, ambulatory. Plan for basic labwork, consult Neurology for possible IV steroid tx

## 2023-12-12 NOTE — ED ADULT NURSE NOTE - NSFALLUNIVINTERV_ED_ALL_ED
Bed/Stretcher in lowest position, wheels locked, appropriate side rails in place/Call bell, personal items and telephone in reach/Instruct patient to call for assistance before getting out of bed/chair/stretcher/Non-slip footwear applied when patient is off stretcher/[place] to call system/Physically safe environment - no spills, clutter or unnecessary equipment/Purposeful proactive rounding/Room/bathroom lighting operational, light cord in reach Bed/Stretcher in lowest position, wheels locked, appropriate side rails in place/Call bell, personal items and telephone in reach/Instruct patient to call for assistance before getting out of bed/chair/stretcher/Non-slip footwear applied when patient is off stretcher/[place] to call system/Physically safe environment - no spills, clutter or unnecessary equipment/Purposeful proactive rounding/Room/bathroom lighting operational, light cord in reach

## 2023-12-12 NOTE — ED PROVIDER NOTE - PROGRESS NOTE DETAILS
Jairo, PGY-3, EM: Jairo, PGY-3, EM: neuro consulted GENESIS Hightower MD: Pt reported to nursing staff that her pain was feeling better. Pt now requests to leave, reporting that she hoped that steroids would have been ordered earlier. We explained to patient that we are waiting for Neurology to evaluate the patient prior to administration of steroids. Pt states that she would like to be discharged. No contraindication to discharge at this time. Pt neurologically intact, ambulatory, A+Ox3. Advised pt to follow-up with her outpatient Neurologist and to return to any ED for worsening or concerning symptoms.

## 2023-12-12 NOTE — ED PROVIDER NOTE - PATIENT PORTAL LINK FT
You can access the FollowMyHealth Patient Portal offered by [facility] by registering at the following website: [email or web address]. By joining [employer]’s FollowMyHealth portal, you will also be able to view your health information using other applications (apps) compatible with our system. You can access the FollowMyHealth Patient Portal offered by [facility] by registering at the following website: [email or web address]. By joining [employer]’s FollowMyHealth portal, you will also be able to view your health information using other applications (apps) compatible with our system.

## 2023-12-12 NOTE — ED PROVIDER NOTE - PHYSICAL EXAMINATION
General: well-appearing, no acute distress  Head: Atraumatic, normocephalic  Eyes: EOM grossly in tact, no scleral icterus, no discharge  Neurology: A&Ox 3, nonfocal, VAUGHN x 4  Respiratory: normal respiratory effort  CV: Extremities warm and well perfused  Abdominal: Soft, non-distended, non-tender, no masses  Extremities: No edema, no deformities  Skin: warm and dry. No rashes

## 2023-12-12 NOTE — ED ADULT NURSE REASSESSMENT NOTE - NS ED NURSE REASSESS COMMENT FT1
pt reports full relief of symptoms spontaneously, does not want to stay for workup, MD Hightower aware. pt  at bedside becoming increasingly agitated with ED staff, VIVIAN Quinteros notified.

## 2023-12-12 NOTE — ED PROVIDER NOTE - NSFOLLOWUPINSTRUCTIONS_ED_ALL_ED_FT
Please follow up with your primary care physician within 2-3 days.   Return to the ER for any new or concerning symptoms.   You may take 975 mg acetaminophen every 6 hours as needed for pain.    PLEASE FOLLOW UP WITH YOUR NEUROLOGIST THIS WEEK FOR FURTHER MANAGEMENT OF YOUR ILLNESS.     CALL YOUR DOCTOR IF YOU DEVELOP ANY OF THE FOLLOWING:    Problems taking medicines for muscle spasms  Problems moving your joints (joint contracture)  Problems moving around or getting out of your bed or chair  Skin sores or redness  Pain that is becoming worse  Recent falls  Choking or coughing when eating  Signs of a bladder infection (fever, burning when you urinate, or frequent urination)    Call 911 or the local emergency number if the following symptoms develop suddenly or are new:    Numbness or weakness of the face, arm, or leg  Blurry or decreased vision  Not able to speak or understand  Dizziness, loss of balance, or falling  Severe headache

## 2023-12-13 LAB
CULTURE RESULTS: SIGNIFICANT CHANGE UP
CULTURE RESULTS: SIGNIFICANT CHANGE UP
SPECIMEN SOURCE: SIGNIFICANT CHANGE UP
SPECIMEN SOURCE: SIGNIFICANT CHANGE UP

## 2023-12-24 ENCOUNTER — EMERGENCY (EMERGENCY)
Facility: HOSPITAL | Age: 43
LOS: 1 days | Discharge: HOME | End: 2023-12-24
Attending: EMERGENCY MEDICINE
Payer: COMMERCIAL

## 2023-12-24 VITALS
HEART RATE: 87 BPM | WEIGHT: 151.9 LBS | HEIGHT: 62 IN | DIASTOLIC BLOOD PRESSURE: 85 MMHG | OXYGEN SATURATION: 99 % | RESPIRATION RATE: 18 BRPM | SYSTOLIC BLOOD PRESSURE: 130 MMHG | TEMPERATURE: 98 F

## 2023-12-24 DIAGNOSIS — G70.00 MYASTHENIA GRAVIS WITHOUT (ACUTE) EXACERBATION: Chronic | ICD-10-CM

## 2023-12-24 DIAGNOSIS — Z90.49 ACQUIRED ABSENCE OF OTHER SPECIFIED PARTS OF DIGESTIVE TRACT: Chronic | ICD-10-CM

## 2023-12-24 LAB
ALBUMIN SERPL ELPH-MCNC: 4.3 G/DL — SIGNIFICANT CHANGE UP (ref 3.3–5)
ALBUMIN SERPL ELPH-MCNC: 4.3 G/DL — SIGNIFICANT CHANGE UP (ref 3.3–5)
ALP SERPL-CCNC: 41 U/L — SIGNIFICANT CHANGE UP (ref 40–120)
ALP SERPL-CCNC: 41 U/L — SIGNIFICANT CHANGE UP (ref 40–120)
ALT FLD-CCNC: 14 U/L — SIGNIFICANT CHANGE UP (ref 10–45)
ALT FLD-CCNC: 14 U/L — SIGNIFICANT CHANGE UP (ref 10–45)
ANION GAP SERPL CALC-SCNC: 10 MMOL/L — SIGNIFICANT CHANGE UP (ref 5–17)
ANION GAP SERPL CALC-SCNC: 10 MMOL/L — SIGNIFICANT CHANGE UP (ref 5–17)
AST SERPL-CCNC: 10 U/L — SIGNIFICANT CHANGE UP (ref 10–40)
AST SERPL-CCNC: 10 U/L — SIGNIFICANT CHANGE UP (ref 10–40)
BASOPHILS # BLD AUTO: 0.04 K/UL — SIGNIFICANT CHANGE UP (ref 0–0.2)
BASOPHILS # BLD AUTO: 0.04 K/UL — SIGNIFICANT CHANGE UP (ref 0–0.2)
BASOPHILS NFR BLD AUTO: 0.4 % — SIGNIFICANT CHANGE UP (ref 0–2)
BASOPHILS NFR BLD AUTO: 0.4 % — SIGNIFICANT CHANGE UP (ref 0–2)
BILIRUB SERPL-MCNC: 0.3 MG/DL — SIGNIFICANT CHANGE UP (ref 0.2–1.2)
BILIRUB SERPL-MCNC: 0.3 MG/DL — SIGNIFICANT CHANGE UP (ref 0.2–1.2)
BUN SERPL-MCNC: 13 MG/DL — SIGNIFICANT CHANGE UP (ref 7–23)
BUN SERPL-MCNC: 13 MG/DL — SIGNIFICANT CHANGE UP (ref 7–23)
CALCIUM SERPL-MCNC: 9.7 MG/DL — SIGNIFICANT CHANGE UP (ref 8.4–10.5)
CALCIUM SERPL-MCNC: 9.7 MG/DL — SIGNIFICANT CHANGE UP (ref 8.4–10.5)
CHLORIDE SERPL-SCNC: 105 MMOL/L — SIGNIFICANT CHANGE UP (ref 96–108)
CHLORIDE SERPL-SCNC: 105 MMOL/L — SIGNIFICANT CHANGE UP (ref 96–108)
CO2 SERPL-SCNC: 25 MMOL/L — SIGNIFICANT CHANGE UP (ref 22–31)
CO2 SERPL-SCNC: 25 MMOL/L — SIGNIFICANT CHANGE UP (ref 22–31)
CREAT SERPL-MCNC: 0.53 MG/DL — SIGNIFICANT CHANGE UP (ref 0.5–1.3)
CREAT SERPL-MCNC: 0.53 MG/DL — SIGNIFICANT CHANGE UP (ref 0.5–1.3)
EGFR: 117 ML/MIN/1.73M2 — SIGNIFICANT CHANGE UP
EGFR: 117 ML/MIN/1.73M2 — SIGNIFICANT CHANGE UP
EOSINOPHIL # BLD AUTO: 0.11 K/UL — SIGNIFICANT CHANGE UP (ref 0–0.5)
EOSINOPHIL # BLD AUTO: 0.11 K/UL — SIGNIFICANT CHANGE UP (ref 0–0.5)
EOSINOPHIL NFR BLD AUTO: 1 % — SIGNIFICANT CHANGE UP (ref 0–6)
EOSINOPHIL NFR BLD AUTO: 1 % — SIGNIFICANT CHANGE UP (ref 0–6)
GLUCOSE SERPL-MCNC: 105 MG/DL — HIGH (ref 70–99)
GLUCOSE SERPL-MCNC: 105 MG/DL — HIGH (ref 70–99)
HCT VFR BLD CALC: 41.2 % — SIGNIFICANT CHANGE UP (ref 34.5–45)
HCT VFR BLD CALC: 41.2 % — SIGNIFICANT CHANGE UP (ref 34.5–45)
HGB BLD-MCNC: 13.6 G/DL — SIGNIFICANT CHANGE UP (ref 11.5–15.5)
HGB BLD-MCNC: 13.6 G/DL — SIGNIFICANT CHANGE UP (ref 11.5–15.5)
IMM GRANULOCYTES NFR BLD AUTO: 0.4 % — SIGNIFICANT CHANGE UP (ref 0–0.9)
IMM GRANULOCYTES NFR BLD AUTO: 0.4 % — SIGNIFICANT CHANGE UP (ref 0–0.9)
LYMPHOCYTES # BLD AUTO: 1.42 K/UL — SIGNIFICANT CHANGE UP (ref 1–3.3)
LYMPHOCYTES # BLD AUTO: 1.42 K/UL — SIGNIFICANT CHANGE UP (ref 1–3.3)
LYMPHOCYTES # BLD AUTO: 13.2 % — SIGNIFICANT CHANGE UP (ref 13–44)
LYMPHOCYTES # BLD AUTO: 13.2 % — SIGNIFICANT CHANGE UP (ref 13–44)
MAGNESIUM SERPL-MCNC: 2.2 MG/DL — SIGNIFICANT CHANGE UP (ref 1.6–2.6)
MAGNESIUM SERPL-MCNC: 2.2 MG/DL — SIGNIFICANT CHANGE UP (ref 1.6–2.6)
MCHC RBC-ENTMCNC: 29.1 PG — SIGNIFICANT CHANGE UP (ref 27–34)
MCHC RBC-ENTMCNC: 29.1 PG — SIGNIFICANT CHANGE UP (ref 27–34)
MCHC RBC-ENTMCNC: 33 GM/DL — SIGNIFICANT CHANGE UP (ref 32–36)
MCHC RBC-ENTMCNC: 33 GM/DL — SIGNIFICANT CHANGE UP (ref 32–36)
MCV RBC AUTO: 88 FL — SIGNIFICANT CHANGE UP (ref 80–100)
MCV RBC AUTO: 88 FL — SIGNIFICANT CHANGE UP (ref 80–100)
MONOCYTES # BLD AUTO: 0.63 K/UL — SIGNIFICANT CHANGE UP (ref 0–0.9)
MONOCYTES # BLD AUTO: 0.63 K/UL — SIGNIFICANT CHANGE UP (ref 0–0.9)
MONOCYTES NFR BLD AUTO: 5.8 % — SIGNIFICANT CHANGE UP (ref 2–14)
MONOCYTES NFR BLD AUTO: 5.8 % — SIGNIFICANT CHANGE UP (ref 2–14)
NEUTROPHILS # BLD AUTO: 8.55 K/UL — HIGH (ref 1.8–7.4)
NEUTROPHILS # BLD AUTO: 8.55 K/UL — HIGH (ref 1.8–7.4)
NEUTROPHILS NFR BLD AUTO: 79.2 % — HIGH (ref 43–77)
NEUTROPHILS NFR BLD AUTO: 79.2 % — HIGH (ref 43–77)
NRBC # BLD: 0 /100 WBCS — SIGNIFICANT CHANGE UP (ref 0–0)
NRBC # BLD: 0 /100 WBCS — SIGNIFICANT CHANGE UP (ref 0–0)
PHOSPHATE SERPL-MCNC: 4.1 MG/DL — SIGNIFICANT CHANGE UP (ref 2.5–4.5)
PHOSPHATE SERPL-MCNC: 4.1 MG/DL — SIGNIFICANT CHANGE UP (ref 2.5–4.5)
PLATELET # BLD AUTO: 203 K/UL — SIGNIFICANT CHANGE UP (ref 150–400)
PLATELET # BLD AUTO: 203 K/UL — SIGNIFICANT CHANGE UP (ref 150–400)
POTASSIUM SERPL-MCNC: 3.7 MMOL/L — SIGNIFICANT CHANGE UP (ref 3.5–5.3)
POTASSIUM SERPL-MCNC: 3.7 MMOL/L — SIGNIFICANT CHANGE UP (ref 3.5–5.3)
POTASSIUM SERPL-SCNC: 3.7 MMOL/L — SIGNIFICANT CHANGE UP (ref 3.5–5.3)
POTASSIUM SERPL-SCNC: 3.7 MMOL/L — SIGNIFICANT CHANGE UP (ref 3.5–5.3)
PROT SERPL-MCNC: 7.5 G/DL — SIGNIFICANT CHANGE UP (ref 6–8.3)
PROT SERPL-MCNC: 7.5 G/DL — SIGNIFICANT CHANGE UP (ref 6–8.3)
RBC # BLD: 4.68 M/UL — SIGNIFICANT CHANGE UP (ref 3.8–5.2)
RBC # BLD: 4.68 M/UL — SIGNIFICANT CHANGE UP (ref 3.8–5.2)
RBC # FLD: 13.1 % — SIGNIFICANT CHANGE UP (ref 10.3–14.5)
RBC # FLD: 13.1 % — SIGNIFICANT CHANGE UP (ref 10.3–14.5)
SODIUM SERPL-SCNC: 140 MMOL/L — SIGNIFICANT CHANGE UP (ref 135–145)
SODIUM SERPL-SCNC: 140 MMOL/L — SIGNIFICANT CHANGE UP (ref 135–145)
WBC # BLD: 10.79 K/UL — HIGH (ref 3.8–10.5)
WBC # BLD: 10.79 K/UL — HIGH (ref 3.8–10.5)
WBC # FLD AUTO: 10.79 K/UL — HIGH (ref 3.8–10.5)
WBC # FLD AUTO: 10.79 K/UL — HIGH (ref 3.8–10.5)

## 2023-12-24 PROCEDURE — 83036 HEMOGLOBIN GLYCOSYLATED A1C: CPT

## 2023-12-24 PROCEDURE — 85025 COMPLETE CBC W/AUTO DIFF WBC: CPT

## 2023-12-24 PROCEDURE — 96375 TX/PRO/DX INJ NEW DRUG ADDON: CPT

## 2023-12-24 PROCEDURE — 83735 ASSAY OF MAGNESIUM: CPT

## 2023-12-24 PROCEDURE — 84100 ASSAY OF PHOSPHORUS: CPT

## 2023-12-24 PROCEDURE — 80053 COMPREHEN METABOLIC PANEL: CPT

## 2023-12-24 PROCEDURE — 96374 THER/PROPH/DIAG INJ IV PUSH: CPT

## 2023-12-24 PROCEDURE — 99284 EMERGENCY DEPT VISIT MOD MDM: CPT

## 2023-12-24 PROCEDURE — 99284 EMERGENCY DEPT VISIT MOD MDM: CPT | Mod: 25

## 2023-12-24 RX ORDER — FAMOTIDINE 10 MG/ML
20 INJECTION INTRAVENOUS
Refills: 0 | Status: DISCONTINUED | OUTPATIENT
Start: 2023-12-24 | End: 2023-12-28

## 2023-12-24 RX ADMIN — FAMOTIDINE 100 MILLIGRAM(S): 10 INJECTION INTRAVENOUS at 23:22

## 2023-12-24 RX ADMIN — Medication 50 MILLIGRAM(S): at 23:41

## 2023-12-24 NOTE — ED ADULT NURSE NOTE - BIRTH SEX
Final Anesthesia Post-op Assessment    Patient: Blair Keys  Procedure(s) Performed: COLONOSCOPY EGD  Anesthesia type: Monitor Anesthesia Care    Vital Last Value   Temperature 36.1 °C (97 °F) (04/24/17 1237)   Pulse 124 (MD and KEVIN aware.  KEVIN will address intra-OP) (04/24/17 1046)   Respiratory Rate 16 (04/24/17 1046)   Non-Invasive   Blood Pressure 103/68 (04/24/17 1252)   Arterial  Blood Pressure     Pulse Oximetry 97 % (04/24/17 1046)     Last 24 I/O:   Intake/Output Summary (Last 24 hours) at 04/24/17 1306  Last data filed at 04/24/17 1233   Gross per 24 hour   Intake              600 ml   Output                0 ml   Net              600 ml       PATIENT LOCATION: PACU Phase 2  POST-OP VITAL SIGNS: stable  LEVEL OF CONSCIOUSNESS: participates in exam, awake, oriented, answers questions appropriately and alert  RESPIRATORY STATUS: spontaneous ventilation, unassisted and room air  CARDIOVASCULAR: blood pressure returned to baseline and stable  HYDRATION: euvolemic    PAIN MANAGEMENT: well controlled  NAUSEA: None  AIRWAY PATENCY: patent  POST-OP ASSESSMENT: no complications, patient tolerated procedure well with no complications, no evidence of recall and sufficiently recovered from acute administration of anesthesia effects and able to participate in evaluation  COMPLICATIONS: none  Comments: The patient is in satisfactory condition at this time.  Chart reviewed.  No major anesthetic complications.  The patient has been reassessed and is appropriate for discharge.      
Female

## 2023-12-24 NOTE — ED ADULT NURSE NOTE - NSFALLUNIVINTERV_ED_ALL_ED
Bed/Stretcher in lowest position, wheels locked, appropriate side rails in place/Call bell, personal items and telephone in reach/Instruct patient to call for assistance before getting out of bed/chair/stretcher/Non-slip footwear applied when patient is off stretcher/[place] to call system/Physically safe environment - no spills, clutter or unnecessary equipment/Purposeful proactive rounding/Room/bathroom lighting operational, light cord in reach Bed/Stretcher in lowest position, wheels locked, appropriate side rails in place/Call bell, personal items and telephone in reach/Instruct patient to call for assistance before getting out of bed/chair/stretcher/Non-slip footwear applied when patient is off stretcher/[place] to call system/Physically safe environment - no spills, clutter or unnecessary equipment/Purposeful proactive rounding/Room/bathroom lighting operational, light cord in reach

## 2023-12-24 NOTE — ED ADULT NURSE NOTE - OBJECTIVE STATEMENT
43y F A&Ox4 c/o neuromyelitis flare up. 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 Pt states on her last visit they prescribed 1000mg solumedrol, however the pt states the dose required is 3x the amount. Pt states she has been following up with her outpatient physician. Upon assessment pt endorsing a "buzzing/burning" sensation on her B/L flanks with pressure down both legs. Pt states she has chronic numbness and that the numbness has been increasing since her last visit. Pt also states there has been no complications with her spine that may cause these complications. Denies any fever, cough, N/V/D/CP/SOB/Gi/Gu symptoms. PMH of anxiety, myasthenia gravis, Neuromyelitis and hypothyroidism. PSH of appendectomy and thymectomy. VSS 43y F A&Ox4 c/o neuromyelitis flare up. 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 Pt states on her last visit they prescribed 1000mg solumedrol, however the pt states the dose required is 3x the amount. Pt states she has been following up with her outpatient physician. Upon assessment pt endorsing a "buzzing/burning" sensation on her B/L flanks with pressure down both legs. Pt states she has chronic numbness and that the numbness has been increasing since her last visit. Pt also states there has been no complications with her spine that may cause these complications. Denies any fever, cough, N/V/D/CP/SOB/Gi/Gu symptoms. PMH of anxiety, myasthenia gravis, Neuromyelitis and hypothyroidism. PSH of appendectomy and thymectomy. VSS

## 2023-12-25 VITALS
RESPIRATION RATE: 16 BRPM | DIASTOLIC BLOOD PRESSURE: 74 MMHG | TEMPERATURE: 98 F | HEART RATE: 79 BPM | OXYGEN SATURATION: 98 % | SYSTOLIC BLOOD PRESSURE: 110 MMHG

## 2023-12-25 LAB
A1C WITH ESTIMATED AVERAGE GLUCOSE RESULT: 4.9 % — SIGNIFICANT CHANGE UP (ref 4–5.6)
A1C WITH ESTIMATED AVERAGE GLUCOSE RESULT: 4.9 % — SIGNIFICANT CHANGE UP (ref 4–5.6)
ESTIMATED AVERAGE GLUCOSE: 94 MG/DL — SIGNIFICANT CHANGE UP (ref 68–114)
ESTIMATED AVERAGE GLUCOSE: 94 MG/DL — SIGNIFICANT CHANGE UP (ref 68–114)

## 2023-12-25 NOTE — ED PROVIDER NOTE - ATTENDING CONTRIBUTION TO CARE
Hx: pt with h/o NMO, seen by neurologist at [place], getting Solaris q2wks for past year with pt-reported improvement, with 2wks of worsening b/l flank pain/vibrations along with numbness of legs.  No motor weakness. No fever, cp, sob, headache. No vision changes.  Seen 2wks ago,given 1g solumedrol, sent home, came back for further steroids but then was upset and went home.  Symptoms similar or worse since then, here requesting 3d course of solumedrol.    PE: well appearing, nontoxic, no respiratory distress.  Neuro nonfocal.  Skin intact. Psych normal mood.  Neuro pristine.     MDM: numbness reported, no signs of guillan-barre, stroke, cord compression.  Questionable whether NMO flare vs alternative causes.  Consult neuro, start steroids IV, check cbc r/o anemia or leukocytosis, check bmp to r/o metabolic derangement and lyte imbalance     Progress Note 0030a: neuro recommends holding steroids and obtaining MRI to confirm dx of NMO flare vs pseuoflare.  Pt upset with this plan, does not want admission, does not want to hold steroids, already feels better after initial infusion -- raising concerns about pseudo.  AT this time, pt does not have an emergency condition and can refuse neurologist's recommendations, can f/u with her own for second opinion, stable for dc home. Hx: pt with h/o NMO, seen by neurologist at [place], getting Solaris q2wks for past year with pt-reported improvement, with 2wks of worsening b/l flank pain/vibrations along with numbness of legs.  No motor weakness. No fever, cp, sob, headache. No vision changes.  Seen 2wks ago,given 1g solumedrol, sent home, came back for further steroids but then was upset and went home.  Symptoms similar or worse since then, here requesting 3d course of solumedrol.    PE: well appearing, nontoxic, no respiratory distress.  Neuro nonfocal.  Skin intact. Psych normal mood.  Neuro pristine.     MDM: numbness reported, no signs of guillan-barre, stroke, cord compression.  Questionable whether NMO flare vs alternative causes.  Consult neuro, start steroids IV, check cbc r/o anemia or leukocytosis, check bmp to r/o metabolic derangement and lyte imbalance     Progress Note 0030a: neuro recommends holding steroids and obtaining MRI to confirm dx of NMO flare vs pseuoflare.  Pt upset with this plan, does not want admission, does not want to hold steroids, already feels better after initial infusion -- raising concerns about pseudo.  AT this time, pt does not have an emergency condition and can refuse neurologist's recommendations, can f/u with her own for second opinion, stable for dc home.

## 2023-12-25 NOTE — ED PROVIDER NOTE - PATIENT PORTAL LINK FT
You can access the FollowMyHealth Patient Portal offered by [facility] by registering at the following website: [email or web address]. By joining [employer]’s FollowMyHealth portal, you will also be able to view your health information using other applications (apps) compatible with our system. You can access the FollowMyHealth Patient Portal offered by [facility] by registering at the following website: [email or web address]. By joining [employer]’s FollowMyHealth portal, you will also be able to view your health information using other applications (apps) compatible with our system.

## 2023-12-25 NOTE — CONSULT NOTE ADULT - ASSESSMENT
Impression: NMO Pseudo-Flare    Recommendations  []MRI C spine and T spine down to conus with and without contrast. Patient would prefer this outpatient which is okay.  []No further steroids at this time  []Follow up established neurologist Dr. Noemi Gale     I offered ms Ms. Szymanski admission (to CDU or Neurology floor pending availability) for MRI. However she would prefer for outpatient MRI. She is upset that no steroids are being offered at this time. I did my best to explain to her that her symptoms are unlikely due to new inflammation and thus dont warrarent empiric high dose steroids. 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 Gale at [place]) presents with "NMO flare "she describes bilateral flank numbness, squeezing pain, and electrical buzzing goes down the legs worse than her typical chronic baseline leg numbness.  She has had these same symptoms many times.    Impression: NMO Pseudo-Flare    Recommendations  []MRI C spine and T spine down to conus with and without contrast. Patient would prefer this outpatient which is okay.  []No further steroids at this time  []Follow up established neurologist Dr. Noemi Gale     I offered ms Ms. Szymanski admission (to CDU or Neurology floor pending availability) for MRI. However she would prefer for outpatient MRI. She is upset that no steroids are being offered at this time. I did my best to explain to her that her symptoms are unlikely due to new inflammation and thus dont warrant empiric high dose steroids. She feels she has gotten better with steroids before. She is not willing stay for mri.      case discussed with neurology attending Dr. 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 Gale at [place]) presents with "NMO flare "she describes bilateral flank numbness, squeezing pain, and electrical buzzing goes down the legs worse than her typical chronic baseline leg numbness.  She has had these same symptoms many times.    Impression: NMO Pseudo-Flare    Recommendations  []MRI C spine and T spine down to conus with and without contrast. Patient would prefer this outpatient which is okay.  []No further steroids at this time  []Follow up established neurologist Dr. Noemi Gale     I offered ms Ms. Szymanski admission (to CDU or Neurology floor pending availability) for MRI. However she would prefer for outpatient MRI. She is upset that no steroids are being offered at this time. I did my best to explain to her that her symptoms are unlikely due to new inflammation and thus dont warrant empiric high dose steroids. She feels she has gotten better with steroids before. She is not willing stay for mri.      case discussed with neurology attending Dr. Wilberto Davalos

## 2023-12-25 NOTE — ED PROVIDER NOTE - NSFOLLOWUPINSTRUCTIONS_ED_ALL_ED_FT
You were offered admission for further workup of your numbness and possible NMO flare but declined.  Neurologist wanted to obtain MRI and hold steroids until further workup but you declined.  AT your request, a dose of steroids, 1000mg solumedrol IV, was given.    Recommend you follow up with your neurologist for further workup and treatment.  Any further steroid use should be directed by your doctor or a neurologist.     Return for any other concerns.    Otherwise, you should follow up with your PCP And Neurologist for further workup and treatment. You were offered admission for further workup of your numbness and possible NMO flare but declined.  Neurologist wanted to obtain MRI and hold steroids until further workup but you declined.  AT your request, a dose of steroids, 1000mg solumedrol IV, was given.    Recommend you follow up with your neurologist for further workup and treatment.  Any further steroid use should be directed by your doctor or a neurologist.       Return for any other concerns.  Otherwise, you should follow up with your PCP And Neurologist for further workup and treatment.

## 2023-12-25 NOTE — CONSULT NOTE ADULT - SUBJECTIVE AND OBJECTIVE BOX
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 Gale at [place]) presents with "NMO flare" she describes bilateral flank numbness, squeezing pain, and electrical buzzing goes down the legs worse than her typical chronic baseline leg numbness.  She has presented similarly December 9 and improved after 1 dose of Solu-Medrol. Has had similar symptoms to this many times often requiring days of steroids for her to feel better   Denies central abdominal or central back pain/numbness.  Denies visual changes including diplopia, dysphagia, difficulty talking, weakness, fevers, chills, chest pain, shortness of breath, palpitations  She had the diagnosis of NMO since 2020, seen Dr. Resendiz in the beginning part of the treatment, but failed treatment 3x, went to Dr. Gale @ [place] for second opinion, which is the neurologist that she follows currently. In 2022, started on soliris and now on biweekly 1200 mg. Her flares usually starts with worsening residual left sided numbness and sharp pain near the 10-12th rib region and lateral lower abdomen. Then it jumps to R side.   Lives at home with family, independent with ADLs for the most part. but sometimes has to use some assistance with walking.    Has hx of hospitalization back in 2021, with left sided back pain, found to have chronic lesion in T4-6 but no enhancement. Treated with 5 days of solumedrol and improved symptom.    Review of Systems:    CONSTITUTIONAL: No fevers or chills  EYES AND ENT: No visual changes or no throat pain   NECK: No pain or stiffness  RESPIRATORY: No hemoptysis or shortness of breath  CARDIOVASCULAR: No chest pain or palpitations  GASTROINTESTINAL: No melena or hematochezia  GENITOURINARY: No dysuria or hematuria  NEUROLOGICAL: +As stated in HPI above  SKIN: No itching, burning, rashes, or lesions   All other review of systems is negative unless indicated above.    Allergies:  No Known Allergies      PMHx/PSHx/Family Hx: As above, otherwise see below   Hypothyroid  Anxiety  Myasthenia gravis  Neuromyelitis optica        Social Hx:  No current use of alcohol, or illicit drugs  occasional toboaco    Medications:  MEDICATIONS  (STANDING):  famotidine  IVPB 20 milliGRAM(s) IV Intermittent two times a day    MEDICATIONS  (PRN):      Vitals:  T(C): 36.7 (12-24-23 @ 21:02), Max: 36.7 (12-24-23 @ 21:02)  HR: 87 (12-24-23 @ 21:02) (87 - 87)  BP: 130/85 (12-24-23 @ 21:02) (130/85 - 130/85)  RR: 18 (12-24-23 @ 21:02) (18 - 18)  SpO2: 99% (12-24-23 @ 21:02) (99% - 99%)    Physical Examination:   General - NAD  Eyes - F Fundoscopy not performed due to safety precautions in the setting of the COVID-19 pandemic    Neurologic Exam:  Mental status - Awake, Alert, Oriented to person, place, and time. Speech fluent, conversational. Follows simple and complex commands. Repeats questions.    Cranial nerves - PERRL no apd , VFF, EOMI no nystagmus, face sensation (V1-V3) intact b/l, facial strength intact without asymmetry b/l, hearing intact b/l, palate with symmetric elevation, trapezius 5/5 strength b/l, tongue midline on protrusion with full lateral movement    Motor - Normal bulk and tone throughout. No pronator drift.  Strength testing            Deltoid      Biceps      Triceps        R            5                 5               5                     5                        L             5                 5               5                     5                                           Hip Flexion    Hip Extension    Knee Flexion    Knee Extension    Dorsiflexion    Plantar Flexion  R              5                           5                       5                           5                            5                          5  L              5                           5                        5                           5                            5                          5    Sensation - Light touch intact throughout    DTR's -             Biceps      Triceps     Brachioradialis      Patellar    Ankle    Toes/plantar response  R             2+             2+                  2+                       2+            2+                 Down  L              2+             2+                 2+                        2+           2+                 Down    Coordination - Finger to Nose intact b/l. No tremors appreciated    Gait and station - Normal casual gait. Romberg (-) able to toe heel and tandem walk    Labs:                        13.6   10.79 )-----------( 203      ( 24 Dec 2023 23:34 )             41.2     12-24    140  |  105  |  13  ----------------------------<  105<H>  3.7   |  25  |  0.53    Ca    9.7      24 Dec 2023 23:34  Phos  4.1     12-24  Mg     2.2     12-24    TPro  7.5  /  Alb  4.3  /  TBili  0.3  /  DBili  x   /  AST  10  /  ALT  14  /  AlkPhos  41  12-24    CAPILLARY BLOOD GLUCOSE        LIVER FUNCTIONS - ( 24 Dec 2023 23:34 )  Alb: 4.3 g/dL / Pro: 7.5 g/dL / ALK PHOS: 41 U/L / ALT: 14 U/L / AST: 10 U/L / GGT: x               CSF:                  Radiology:     Neurology - Consult Note    -  Spectra: [phone] ([facility]), [phone] ([facility])  -    HPI: Patient BALDO CAMILO is a 43y (1980) woman with a PMHx significant for 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 Gale at [place]) presents with "NMO flare" she describes bilateral flank numbness, squeezing pain, and electrical buzzing goes down the legs worse than her typical chronic baseline leg numbness.  She has presented similarly December 9 and improved after 1 dose of Solu-Medrol. Has had similar symptoms to this many times often requiring days of steroids for her to feel better   Denies central abdominal or central back pain/numbness.  Denies visual changes including diplopia, dysphagia, difficulty talking, weakness, fevers, chills, chest pain, shortness of breath, palpitations  She had the diagnosis of NMO since 2020, seen Dr. Resendiz in the beginning part of the treatment, but failed treatment 3x, went to Dr. Gale @ [place] for second opinion, which is the neurologist that she follows currently. In 2022, started on soliris and now on biweekly 1200 mg. Her flares usually starts with worsening residual left sided numbness and sharp pain near the 10-12th rib region and lateral lower abdomen. Then it jumps to R side.   Lives at home with family, independent with ADLs for the most part. but sometimes has to use some assistance with walking.    Has hx of hospitalization back in 2021, with left sided back pain, found to have chronic lesion in T4-6 but no enhancement. Treated with 5 days of solumedrol and improved symptom.    Review of Systems:    CONSTITUTIONAL: No fevers or chills  EYES AND ENT: No visual changes or no throat pain   NECK: No pain or stiffness  RESPIRATORY: No hemoptysis or shortness of breath  CARDIOVASCULAR: No chest pain or palpitations  GASTROINTESTINAL: No melena or hematochezia  GENITOURINARY: No dysuria or hematuria  NEUROLOGICAL: +As stated in HPI above  SKIN: No itching, burning, rashes, or lesions   All other review of systems is negative unless indicated above.    Allergies:  No Known Allergies      PMHx/PSHx/Family Hx: As above, otherwise see below   Hypothyroid  Anxiety  Myasthenia gravis  Neuromyelitis optica        Social Hx:  No current use of alcohol, or illicit drugs  occasional toboaco    Medications:  MEDICATIONS  (STANDING):  famotidine  IVPB 20 milliGRAM(s) IV Intermittent two times a day    MEDICATIONS  (PRN):      Vitals:  T(C): 36.7 (12-24-23 @ 21:02), Max: 36.7 (12-24-23 @ 21:02)  HR: 87 (12-24-23 @ 21:02) (87 - 87)  BP: 130/85 (12-24-23 @ 21:02) (130/85 - 130/85)  RR: 18 (12-24-23 @ 21:02) (18 - 18)  SpO2: 99% (12-24-23 @ 21:02) (99% - 99%)    Physical Examination:   General - NAD  Eyes - F Fundoscopy not performed due to safety precautions in the setting of the COVID-19 pandemic    Neurologic Exam:  Mental status - Awake, Alert, Oriented to person, place, and time. Speech fluent, conversational. Follows simple and complex commands. Repeats questions.    Cranial nerves - PERRL no apd , VFF, EOMI no nystagmus, face sensation (V1-V3) intact b/l, facial strength intact without asymmetry b/l, hearing intact b/l, palate with symmetric elevation, trapezius 5/5 strength b/l, tongue midline on protrusion with full lateral movement    Motor - Normal bulk and tone throughout. No pronator drift.  Strength testing            Deltoid      Biceps      Triceps        R            5                 5               5                     5                        L             5                 5               5                     5                                           Hip Flexion    Hip Extension    Knee Flexion    Knee Extension    Dorsiflexion    Plantar Flexion  R              5                           5                       5                           5                            5                          5  L              5                           5                        5                           5                            5                          5    Sensation - Light touch intact throughout    DTR's -             Biceps      Triceps     Brachioradialis      Patellar    Ankle    Toes/plantar response  R             2+             2+                  2+                       2+            2+                 Down  L              2+             2+                 2+                        2+           2+                 Down    Coordination - Finger to Nose intact b/l. No tremors appreciated    Gait and station - Normal casual gait. Romberg (-) able to toe heel and tandem walk    Labs:                        13.6   10.79 )-----------( 203      ( 24 Dec 2023 23:34 )             41.2     12-24    140  |  105  |  13  ----------------------------<  105<H>  3.7   |  25  |  0.53    Ca    9.7      24 Dec 2023 23:34  Phos  4.1     12-24  Mg     2.2     12-24    TPro  7.5  /  Alb  4.3  /  TBili  0.3  /  DBili  x   /  AST  10  /  ALT  14  /  AlkPhos  41  12-24    CAPILLARY BLOOD GLUCOSE        LIVER FUNCTIONS - ( 24 Dec 2023 23:34 )  Alb: 4.3 g/dL / Pro: 7.5 g/dL / ALK PHOS: 41 U/L / ALT: 14 U/L / AST: 10 U/L / GGT: x               CSF:                  Radiology:

## 2024-01-01 NOTE — ASU PATIENT PROFILE, ADULT - VISION (WITH CORRECTIVE LENSES IF THE PATIENT USUALLY WEARS THEM):
"[facility]  Discharge Summary   Nursery    Patient Name: Kuldeep Feldman  MRN: [id number]  Admission Date: 2024    Subjective:       Delivery Date: 2024   Delivery Time: 7:08 AM   Delivery Type: Vaginal, Spontaneous     Kuldeep Feldman is a 2 days old born at 40w4d to a mother who is a 19 y.o.. Mother has no past medical history on file.    Prenatal Labs Review:  ABO/Rh:   Lab Results   Component Value Date/Time    GROUPTRH O POS 2024 04:06 AM    GROUPTRH O POS 2024 12:00 AM     Group B Beta Strep:   Lab Results   Component Value Date/Time    STREPBCULT Negative 2024 12:00 AM     GBS PCR: No results found for: "GRBSTREPPCR"  HIV: 2024: HIV 1/2 Ag/Ab Non-Reactive (Ref range: )  Syphilis:   Lab Results   Component Value Date/Time    TREPABIGMIGG Nonreactive 2024 04:06 AM     Lab Results   Component Value Date/Time    RPR Non-Reactive 2024 12:00 AM    RPR Non-Reactive 2024 12:00 AM     Hepatitis B Surface Antigen:   Lab Results   Component Value Date/Time    HEPBSAG Negative 2024 12:00 AM     Rubella Immune Status:   Lab Results   Component Value Date/Time    RUBELLAIMMUN Immune 2024 12:00 AM       Pregnancy/Delivery Course:  The pregnancy was uncomplicated. Prenatal ultrasound revealed normal anatomy. Prenatal care was good. Mother received ampicillin, routine medications related to labor and delivery, and gentamicin. Membrane rupture: 28 hours.  Membrane Rupture Date: 24  Membrane Rupture Time: 830  The delivery was complicated by maternal temp of 100.3 and prolonged rupture, + meconium stained amniotic fluid .  Apgar scores:   Apgars      Apgar Component Scores:  1 min.:  5 min.:  10 min.:  15 min.:  20 min.:    Skin color:  0  1       Heart rate:  2  2       Reflex irritability:  2  2       Muscle tone:  2  2       Respiratory effort:  2  2       Total:  8  9       Apgars assigned by: " "CHUCK SAHA           Objective:     Admission GA: 40w4d  Admission Weight: 3990 g (8 lb 12.7 oz) (Filed from Delivery Summary)  Admission  Head Circumference: 34 cm   Admission Length: Height: 50.2 cm (19.75") (Filed from Delivery Summary)    Delivery Method: Vaginal, Spontaneous       Labs:  Recent Results (from the past week)   Cord blood evaluation    Collection Time: 24  8:18 AM   Result Value Ref Range    Cord ABO O     Cord Rh POS     Cord Direct Jennie NEG    POCT bilirubinometry    Collection Time: 24  7:10 AM   Result Value Ref Range    Bilirubinometry Index 6.9    Bilirubin  Profile    Collection Time: 24 10:34 AM   Result Value Ref Range    Bilirubin, Total -  4.9 0.1 - 6.0 mg/dL    Bilirubin, Indirect 4.4 0.6 - 10.0 mg/dL    Bilirubin, Direct -  0.5 0.1 - 0.6 mg/dL   POCT bilirubinometry    Collection Time: 24  6:28 AM   Result Value Ref Range    Bilirubinometry Index 4.8        There is no immunization history for the selected administration types on file for this patient.    Nursery Course   Infant is feeding well, voiding and stooling appropriately for age.     [place] Screen sent greater than 24 hours?: yes  Hearing Screen Right Ear: ABR (auditory brainstem response), passed    Left Ear: ABR (auditory brainstem response), passed   Stooling: Yes  Voiding: Yes  SpO2: Pre-Ductal (Right Hand): 97 %  SpO2: Post-Ductal: 98 %  Car Seat Test?    Therapeutic Interventions: none  Surgical Procedures: none    Discharge Exam:   Discharge Weight: Weight: 3690 g (8 lb 2.2 oz) (night shift weight)  Weight Change Since Birth: -8%      Physical Exam    Gen: Alert, appropriately responsive to exam, well appearing    HEENT: AFOSF, normocephalic, atraumatic. RR present b/l. +MILD MOLDING. Eyes and ear with normal placement, nares patent, palate and clavicles intact. MMM.    Resp: Lungs CTAB with good aeration throughout, no increased WOB, no grunting, no " wheezing/rales/rhonchi    CV: HRRR, no murmurs/rubs gallops. Brachial and femoral pulses strong and equal b/l. CR <2 sec.    Abd: Soft, NABS.    : Normal external genitalia, testes descended b/l.    Neuro/MSK: Moves all extremities appropriately. Normal muscle bulk and tone. Negative hip O/B. Normal suck, grasp, and Nataliya reflexes. No sacral dimple or tuft of hair.    Skin: No notable rash or jaundice present. +SLATE GRAY MACULE TO SACRUM.      Assessment and Plan:     Discharge Date and Time: ,     Final Diagnoses:   Obstetric  * Term  delivered vaginally, current hospitalization  Infant is a 2 days old AGA male born at 40w4d to a 19 y.o.  via Vaginal, Spontaneous. GBS Negative. PNL negative. Jennie negative. ROM 28 hrs PTD. Down -8% since birth. Birth Weight: 3990 g (8 lb 12.7 oz). +meconium at delivery    Discharge planning:  Received Vitamin K, erythromycin eye ointment   DECLINED Hepatitis B vaccine  Hearing: Hearing Screen Date: 24  Hearing Screen, Right Ear: ABR (auditory brainstem response), passed  Hearing Screen, Left Ear: ABR (auditory brainstem response), passed  CCHD: SpO2: Pre-Ductal (Right Hand): 97 % SpO2: Post-Ductal: 98 %  Lab Results   Component Value Date/Time    TCBILIRUBIN 2024 06:28 AM     Discharge education completed, to include safe sleep, routine  feeding, car seat safety, and RTC precautions; all questions answered. All discharge instructions and exam performed with official  services. Parents voiced feeling confident in being discharged home today.        Prolonged rupture of membranes  ROM of 28 hours with maternal temp pre and post delivery of 100.3. MOP received Amp and Gent abx >4 hrs prior to delivery. Baby remained well appearing throughout nursery stay, monitored for >48 hrs with normal VS and reassuring PE.          Goals of Care Treatment Preferences:  Code Status: Full Code      Discharged Condition: Good    >30 minutes  spent on discharge today    Disposition: Discharge to Home    Follow Up:    With PCP in 1-2 days    Patient Instructions:   No discharge procedures on file.  Medications:  Vitamin D3 400 units/ml oral drop once daily      Chrissy Garland DO  Pediatrics  [facility]         Normal vision: sees adequately in most situations; can see medication labels, newsprint

## 2024-01-12 ENCOUNTER — APPOINTMENT (OUTPATIENT)
Dept: UROLOGY | Facility: CLINIC | Age: 44
End: 2024-01-12
Payer: COMMERCIAL

## 2024-01-12 VITALS
OXYGEN SATURATION: 96 % | DIASTOLIC BLOOD PRESSURE: 78 MMHG | RESPIRATION RATE: 16 BRPM | HEART RATE: 73 BPM | SYSTOLIC BLOOD PRESSURE: 125 MMHG | TEMPERATURE: 98 F

## 2024-01-12 DIAGNOSIS — N39.0 URINARY TRACT INFECTION, SITE NOT SPECIFIED: ICD-10-CM

## 2024-01-12 PROCEDURE — 51798 US URINE CAPACITY MEASURE: CPT

## 2024-01-12 PROCEDURE — 99203 OFFICE O/P NEW LOW 30 MIN: CPT

## 2024-01-14 PROBLEM — N39.0 UTI (URINARY TRACT INFECTION): Status: ACTIVE | Noted: 2020-12-14

## 2024-01-14 NOTE — HISTORY OF PRESENT ILLNESS
[FreeTextEntry1] : 43F presents for initial evaluation of UTI  self-referred   PMH significant for: hypothyroid , current smoker   PSH significant for: appy, thymectomy  Significant meds: synthroid    Seen in [facility] ED on 12/25 for numbness  A1c: 4.9; WBC: 11; Cr: 0.5; Ucx: nl  daniela   Has concerns over small blood noted on UA Denies dysuria, fevers, chills, nausea, vomiting, history of UTI No other  concerns

## 2024-01-14 NOTE — ASSESSMENT
[FreeTextEntry1] :  Ms. Szymanski presents for initial evaluation of an abnormal UA to rule out UTI She has no urinary symptoms to corroborate any concern for any infection UA today shows small blood, of no concern given she has just completed her menstrual cycle PVR of 3 shows she is emptying well Notes, labs from recent emergency room visit reviewed  We discussed extensively that diagnosis and treatment of UTI is contingent on clinical symptoms as well as a positive urine culture.  The patient has neither.  There is no further need for preventative measures or urologic workup at this time  Recommendations -Urine culture -will treat if positive -RTC as needed

## 2024-01-14 NOTE — PHYSICAL EXAM
[General Appearance - Well Developed] : well developed [Abdomen Soft] : soft [General Appearance - Well Nourished] : well nourished [Abdomen Tenderness] : non-tender [Normal Station and Gait] : the gait and station were normal for the patient's age [de-identified] : PVR: 5 [Oriented To Time, Place, And Person] : oriented to person, place, and time

## 2024-01-17 LAB — BACTERIA UR CULT: ABNORMAL

## 2024-01-18 NOTE — ASU DISCHARGE PLAN (ADULT/PEDIATRIC). - PATIENT/GUARDIAN NAME (SIGNATURE): ____________________________________
Detail Level: Detailed Prescription Strength Graduated Compression Stockings Recommendations: The patient was counseled that prescription strength graduated compression stockings should be worn for all waking hours. They will follow up with a venous specialist to monitor graduated compression stocking usage and their symptoms. Statement Selected

## 2024-03-28 NOTE — PHYSICAL THERAPY INITIAL EVALUATION ADULT - RISK REDUCTION/PREVENTION, PT EVAL
How Severe Are Your Spot(S)?: mild
What Type Of Note Output Would You Prefer (Optional)?: Bullet Format
What Is The Reason For Today's Visit?: Full Body Skin Examination
What Is The Reason For Today's Visit? (Being Monitored For X): the re-examination of lesions previously examined
risk factors

## 2024-04-19 RX ORDER — SULFAMETHOXAZOLE AND TRIMETHOPRIM 800; 160 MG/1; MG/1
800-160 TABLET ORAL
Qty: 6 | Refills: 0 | Status: ACTIVE | COMMUNITY
Start: 2024-01-16 | End: 1900-01-01

## 2024-04-23 ENCOUNTER — NON-APPOINTMENT (OUTPATIENT)
Age: 44
End: 2024-04-23

## 2024-04-24 LAB
APPEARANCE: CLEAR
BACTERIA UR CULT: NORMAL
BACTERIA: ABNORMAL /HPF
BILIRUBIN URINE: NEGATIVE
BLOOD URINE: NEGATIVE
CAST: 0 /LPF
COLOR: YELLOW
EPITHELIAL CELLS: 9 /HPF
GLUCOSE QUALITATIVE U: >=1000 MG/DL
KETONES URINE: NEGATIVE MG/DL
LEUKOCYTE ESTERASE URINE: ABNORMAL
MICROSCOPIC-UA: NORMAL
NITRITE URINE: NEGATIVE
PH URINE: 6
PROTEIN URINE: NEGATIVE MG/DL
RED BLOOD CELLS URINE: 2 /HPF
SPECIFIC GRAVITY URINE: >1.03
UROBILINOGEN URINE: 0.2 MG/DL
WHITE BLOOD CELLS URINE: 17 /HPF

## 2024-05-07 ENCOUNTER — APPOINTMENT (OUTPATIENT)
Dept: OPHTHALMOLOGY | Facility: CLINIC | Age: 44
End: 2024-05-07

## 2024-05-24 ENCOUNTER — APPOINTMENT (OUTPATIENT)
Dept: OPHTHALMOLOGY | Facility: CLINIC | Age: 44
End: 2024-05-24
Payer: COMMERCIAL

## 2024-05-24 ENCOUNTER — NON-APPOINTMENT (OUTPATIENT)
Age: 44
End: 2024-05-24

## 2024-05-24 PROCEDURE — 99204 OFFICE O/P NEW MOD 45 MIN: CPT

## 2024-09-19 ENCOUNTER — APPOINTMENT (OUTPATIENT)
Dept: OBGYN | Facility: CLINIC | Age: 44
End: 2024-09-19

## 2024-10-02 ENCOUNTER — APPOINTMENT (OUTPATIENT)
Dept: OBGYN | Facility: CLINIC | Age: 44
End: 2024-10-02

## 2024-11-03 NOTE — H&P ADULT - NSHPPHYSICALEXAM_GEN_ALL_CORE
MS: Eyes open at baseline, responds to verbal stimuli, alert and oriented to self, place, and situation, follows all commands  Language: Speech is clear, fluent, good with repetition and comprehension  CN: PERRL (R 3mm and L 3mm), VFF, EOMI, R beating horizontal nystagmus on r gaze, no facial asymmetry, facial sensation intact b/l, hearing normal b/l with finguer rubs, shoulder shrug intact, tongue midline, no atrophy, normal movements  Motor: 5/5 strength in deltoid, biceps, , hip flexion, knee extension, dorsi/plantar flexion  Reflex: 2 biceps, BR, achilles b/l. 3 for b/l patellar. Plantar response was mute b/l.   Extinction: intact to double simultaneous stimuli  Sensation: Intact to light touch and temperature throughout   Coordination: no dysmetria on FTN  Gait: normal gait and stance MS: Eyes open at baseline, responds to verbal stimuli, alert and oriented to self, place, and situation, follows all commands  Language: Speech is clear, fluent, good with repetition and comprehension  CN: PERRL (R 3mm and L 3mm), VFF, EOMI, R beating horizontal nystagmus on r gaze, no facial asymmetry, facial sensation intact b/l, hearing normal b/l with finguer rubs, shoulder shrug intact, tongue midline, no atrophy, normal movements  Motor: 5/5 strength in deltoid, biceps, , hip flexion, knee extension, dorsi/plantar flexion  Reflex: 2 biceps, BR, achilles b/l. 3 for b/l patellar. Plantar response was mute b/l.   Extinction: intact to double simultaneous stimuli  Sensation: B/l numbness to light touch from superior iliac crest down to b/l toes. No back pain upon palpation of paraspinal muscles b/l  Coordination: no dysmetria on FTN  Gait: normal gait and stance MS: Eyes open at baseline, responds to verbal stimuli, alert and oriented to self, place, and situation, follows all commands  Language: Speech is clear, fluent, good with repetition and comprehension  CN: PERRL (R 3mm and L 3mm), VFF, EOMI, R beating horizontal nystagmus on r gaze, no facial asymmetry, facial sensation intact b/l, hearing normal b/l with finguer rubs, shoulder shrug intact, tongue midline, no atrophy, normal movements  Motor: 5/5 strength in deltoid, biceps, , hip flexion, knee extension, dorsi/plantar flexion  Reflex: 2 biceps, BR, achilles b/l. 3 for b/l patellar. Plantar response was mute b/l.   Extinction: intact to double simultaneous stimuli  Sensation: B/l numbness to light touch from iliac crest to anterior tibialis to toes. No back pain upon palpation of paraspinal muscles b/l  Coordination: no dysmetria on FTN  Gait: normal gait and stance Attending Attestation (For Attendings USE Only)...

## 2024-12-16 ENCOUNTER — APPOINTMENT (OUTPATIENT)
Dept: OBGYN | Facility: CLINIC | Age: 44
End: 2024-12-16

## 2024-12-16 NOTE — ED PROVIDER NOTE - TOBACCO USE
Refills Requested: gabapentin (NEURONTIN) 100 MG capsule . Refills was sent on 11/7/24    Preferred pharmacy: [address] - P [phone] - F [phone]   Type of refill: rsl         Never smoker

## 2025-04-10 ENCOUNTER — APPOINTMENT (OUTPATIENT)
Dept: UROLOGY | Facility: CLINIC | Age: 45
End: 2025-04-10
Payer: MEDICAID

## 2025-04-10 DIAGNOSIS — N39.3 STRESS INCONTINENCE (FEMALE) (MALE): ICD-10-CM

## 2025-04-10 DIAGNOSIS — Z00.00 ENCOUNTER FOR GENERAL ADULT MEDICAL EXAMINATION W/OUT ABNORMAL FINDINGS: ICD-10-CM

## 2025-04-10 DIAGNOSIS — N32.81 OVERACTIVE BLADDER: ICD-10-CM

## 2025-04-10 PROCEDURE — 99204 OFFICE O/P NEW MOD 45 MIN: CPT

## 2025-04-10 PROCEDURE — G2211 COMPLEX E/M VISIT ADD ON: CPT | Mod: NC

## 2025-04-10 PROCEDURE — 99214 OFFICE O/P EST MOD 30 MIN: CPT

## 2025-04-11 LAB
BILIRUB UR QL STRIP: NEGATIVE
CLARITY UR: CLEAR
COLLECTION METHOD: NORMAL
GLUCOSE UR-MCNC: 500
HCG UR QL: 0.2 EU/DL
HGB UR QL STRIP.AUTO: NEGATIVE
KETONES UR-MCNC: NEGATIVE
LEUKOCYTE ESTERASE UR QL STRIP: ABNORMAL
NITRITE UR QL STRIP: NEGATIVE
PH UR STRIP: 5.5
PROT UR STRIP-MCNC: NEGATIVE
SP GR UR STRIP: <=1.005

## 2025-04-12 LAB — BACTERIA UR CULT: NORMAL

## 2025-05-01 ENCOUNTER — NON-APPOINTMENT (OUTPATIENT)
Age: 45
End: 2025-05-01

## 2025-05-01 ENCOUNTER — APPOINTMENT (OUTPATIENT)
Dept: UROLOGY | Facility: CLINIC | Age: 45
End: 2025-05-01

## 2025-05-29 ENCOUNTER — APPOINTMENT (OUTPATIENT)
Dept: UROLOGY | Facility: CLINIC | Age: 45
End: 2025-05-29

## 2025-06-05 ENCOUNTER — NON-APPOINTMENT (OUTPATIENT)
Age: 45
End: 2025-06-05